# Patient Record
Sex: FEMALE | Race: WHITE | NOT HISPANIC OR LATINO | Employment: OTHER | URBAN - METROPOLITAN AREA
[De-identification: names, ages, dates, MRNs, and addresses within clinical notes are randomized per-mention and may not be internally consistent; named-entity substitution may affect disease eponyms.]

---

## 2017-03-21 ENCOUNTER — OFFICE VISIT (OUTPATIENT)
Dept: FAMILY MEDICINE | Facility: CLINIC | Age: 67
End: 2017-03-21
Payer: COMMERCIAL

## 2017-03-21 VITALS
TEMPERATURE: 97.6 F | HEIGHT: 58 IN | DIASTOLIC BLOOD PRESSURE: 69 MMHG | OXYGEN SATURATION: 95 % | WEIGHT: 150 LBS | HEART RATE: 79 BPM | BODY MASS INDEX: 31.49 KG/M2 | SYSTOLIC BLOOD PRESSURE: 137 MMHG

## 2017-03-21 DIAGNOSIS — R52 BURNING PAIN: ICD-10-CM

## 2017-03-21 DIAGNOSIS — M54.9 BACK PAIN, UNSPECIFIED BACK LOCATION, UNSPECIFIED BACK PAIN LATERALITY, UNSPECIFIED CHRONICITY: Primary | ICD-10-CM

## 2017-03-21 LAB
ALBUMIN UR-MCNC: NEGATIVE MG/DL
APPEARANCE UR: CLEAR
BACTERIA #/AREA URNS HPF: ABNORMAL /HPF
BILIRUB UR QL STRIP: NEGATIVE
COLOR UR AUTO: YELLOW
GLUCOSE UR STRIP-MCNC: NEGATIVE MG/DL
HGB UR QL STRIP: NEGATIVE
KETONES UR STRIP-MCNC: NEGATIVE MG/DL
LEUKOCYTE ESTERASE UR QL STRIP: ABNORMAL
NITRATE UR QL: NEGATIVE
NON-SQ EPI CELLS #/AREA URNS LPF: ABNORMAL /LPF
PH UR STRIP: 7 PH (ref 5–7)
RBC #/AREA URNS AUTO: ABNORMAL /HPF (ref 0–2)
SP GR UR STRIP: 1.01 (ref 1–1.03)
URN SPEC COLLECT METH UR: ABNORMAL
UROBILINOGEN UR STRIP-ACNC: 0.2 EU/DL (ref 0.2–1)
WBC #/AREA URNS AUTO: ABNORMAL /HPF (ref 0–2)

## 2017-03-21 PROCEDURE — 99214 OFFICE O/P EST MOD 30 MIN: CPT | Performed by: NURSE PRACTITIONER

## 2017-03-21 PROCEDURE — 81001 URINALYSIS AUTO W/SCOPE: CPT | Performed by: NURSE PRACTITIONER

## 2017-03-21 PROCEDURE — 87086 URINE CULTURE/COLONY COUNT: CPT | Performed by: NURSE PRACTITIONER

## 2017-03-21 ASSESSMENT — ENCOUNTER SYMPTOMS
TINGLING: 0
BACK PAIN: 1
NAUSEA: 1
SHORTNESS OF BREATH: 0
FEVER: 0
HEADACHES: 1
COUGH: 0
FLANK PAIN: 1
INSOMNIA: 1
MYALGIAS: 1
ABDOMINAL PAIN: 0
DYSURIA: 0
FREQUENCY: 1
DIZZINESS: 1

## 2017-03-21 NOTE — PROGRESS NOTES
HPI      SUBJECTIVE:                                                    Keyonna De La Garza is a 66 year old female who presents to clinic today for the following health issues:      URINARY TRACT SYMPTOMS      Duration: 6 days    Description  frequency, urgency and back pain    Intensity:  moderate    Accompanying signs and symptoms:  Fever/chills: no   Flank pain YES  Nausea and vomiting: YES- nausea sometimes  Vaginal symptoms: none  Abdominal/Pelvic Pain: no     History  History of frequent UTI's: YES  History of kidney stones: no   Sexually Active: no   Possibility of pregnancy: No    Precipitating or alleviating factors: None    Therapies tried and outcome: Aleve and ice helped.       10 months ago started to have terrible back pain at about flank level.   As her pain increased, her nails were turning black so she had a large workup (CT chest/abd/pel, echo were all normal)  Then about every other month since, started getting a flare up of the back pain that last about 2 weeks. 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        Past Medical History   Diagnosis Date     Abdominal pain, other specified site 2013     RUQ     Gallstones 2013     GERD (gastroesophageal reflux disease)      Hypothyroidism      Past Surgical History   Procedure Laterality Date     Colonoscopy       Laparoscopic cholecystectomy  4/24/2013     Procedure: LAPAROSCOPIC CHOLECYSTECTOMY;  Laparoscopic cholecystectomy.;  Surgeon: Bong Pyle MD;  Location:   "OR     Social History   Substance Use Topics     Smoking status: Never Smoker     Smokeless tobacco: Never Used     Alcohol use 0.0 oz/week     0 Standard drinks or equivalent per week      Comment: occas     Current Outpatient Prescriptions   Medication Sig Dispense Refill     levothyroxine (SYNTHROID, LEVOTHROID) 88 MCG tablet Take 1 tablet (88 mcg) by mouth daily 90 tablet 3     Ranitidine HCl (ZANTAC PO) Take 75 mg by mouth daily as needed.       Allergies   Allergen Reactions     Darvon [Aspirin]        Reviewed PMH, med list and allergies.      Review of Systems   Constitutional: Negative for fever.   Respiratory: Negative for cough and shortness of breath.    Cardiovascular: Negative for chest pain and leg swelling.   Gastrointestinal: Positive for nausea (very rare and very mild). Negative for abdominal pain.   Genitourinary: Positive for flank pain, frequency and urgency. Negative for dysuria.   Musculoskeletal: Positive for back pain and myalgias.   Skin: Negative for rash.   Neurological: Positive for dizziness (mild) and headaches. Negative for tingling.   Psychiatric/Behavioral: The patient has insomnia (difficulty sleeping).          /69 (BP Location: Right arm, Patient Position: Chair, Cuff Size: Adult Regular)  Pulse 79  Temp 97.6  F (36.4  C) (Oral)  Ht 4' 9.5\" (1.461 m)  Wt 150 lb (68 kg)  SpO2 95%  BMI 31.9 kg/m2      Physical Exam   Constitutional: She is oriented to person, place, and time and well-developed, well-nourished, and in no distress.   HENT:   Head: Normocephalic.   Eyes: Conjunctivae are normal.   Neck: Normal range of motion. Neck supple.   Cardiovascular: Normal rate, regular rhythm, normal heart sounds and intact distal pulses.    No murmur heard.  Pulmonary/Chest: Effort normal and breath sounds normal. No respiratory distress.   Musculoskeletal: Normal range of motion. She exhibits no tenderness.   Neurological: She is alert and oriented to person, place, and time. She has " normal sensation and normal strength. Gait normal.   Skin: Skin is warm and dry. No rash noted. No erythema.   Psychiatric: Mood and affect normal.   Vitals reviewed.      Assessment and Plan:       ICD-10-CM    1. Back pain, unspecified back location, unspecified back pain laterality, unspecified chronicity M54.9 *UA reflex to Microscopic and Culture ([facility] (except [place] and [place])     Urine Culture Aerobic Bacterial     Urine Microscopic     NEUROLOGY ADULT REFERRAL   2. Burning pain R52 NEUROLOGY ADULT REFERRAL       Episodic burning upper back pain for almost 1 year.   I suspect this is r/t stress as she reports significant strain taking care of her 97 yo mother, including financial burdens   She already had a large neg workup of CT chest/abd/pel and echo along with bloodwork   UA today neg but will await culture   I did refer pt to neuro for consult   She should fu with PCP if symptoms persist        RAJINDER Burton, CNP  [facility] [place]

## 2017-03-21 NOTE — MR AVS SNAPSHOT
After Visit Summary   3/21/2017    Keyonna De La Garza    MRN: [id number]           Patient Information     Date Of Birth          1950        Visit Information        Provider Department      3/21/2017 9:30 AM Harry Franklin APRN CNP [facility]        Today's Diagnoses     Dysuria    -  1    Back pain, unspecified back location, unspecified back pain laterality, unspecified chronicity        Burning pain          Care Instructions    Please follow up with neurology         Follow-ups after your visit        Additional Services     NEUROLOGY ADULT REFERRAL       Your provider has referred you to: N: [facility]  [place] [phone]   [email or web address]    Reason for Referral: Consult    Please be aware that coverage of these services is subject to the terms and limitations of your health insurance plan.  Call member services at your health plan with any benefit or coverage questions.      Please bring the following with you to your appointment:    (1) Any X-Rays, CTs or MRIs which have been performed.  Contact the facility where they were done to arrange for  prior to your scheduled appointment.    (2) List of current medications  (3) This referral request   (4) Any documents/labs given to you for this referral                  Who to contact     If you have questions or need follow up information about today's clinic visit or your schedule please contact [facility] directly at [phone].  Normal or non-critical lab and imaging results will be communicated to you by MyChart, letter or phone within 4 business days after the clinic has received the results. If you do not hear from us within 7 days, please contact the clinic through MyChart or phone. If you have a critical or abnormal lab result, we will notify you by phone as soon as possible.  Submit refill requests through [employer] or call your pharmacy and  "they will forward the refill request to us. Please allow 3 business days for your refill to be completed.          Additional Information About Your Visit        Care EveryWhere ID     This is your Care EveryWhere ID. This could be used by other organizations to access your [place] medical records  [id number]        Your Vitals Were     Pulse Temperature Height Pulse Oximetry BMI (Body Mass Index)       79 97.6  F (36.4  C) (Oral) 4' 9.5\" (1.461 m) 95% 31.9 kg/m2        Blood Pressure from Last 3 Encounters:   03/21/17 137/69   08/10/16 116/64   07/18/16 120/76    Weight from Last 3 Encounters:   03/21/17 150 lb (68 kg)   08/10/16 144 lb 12.8 oz (65.7 kg)   07/18/16 145 lb 8 oz (66 kg)              We Performed the Following     *UA reflex to Microscopic and Culture ([facility] (except [place] and [place])     NEUROLOGY ADULT REFERRAL     Urine Culture Aerobic Bacterial     Urine Microscopic        Primary Care Provider Office Phone # Fax #    Hutchinson Tab De La Garza [phone]       [facility] [address] [facility] [address]  [facility] [address]        Thank you!     Thank you for choosing [facility]  for your care. Our goal is always to provide you with excellent care. Hearing back from our patients is one way we can continue to improve our services. Please take a few minutes to complete the written survey that you may receive in the mail after your visit with us. Thank you!             Your Updated Medication List - Protect others around you: Learn how to safely use, store and throw away your medicines at www.disposemymeds.org.          This list is accurate as of: 3/21/17 10:12 AM.  Always use your most recent med list.                   Brand Name Dispense Instructions for use    levothyroxine 88 MCG tablet    SYNTHROID/LEVOTHROID    90 tablet    Take 1 tablet (88 mcg) by mouth daily       ZANTAC PO      Take 75 mg by mouth daily as " needed.

## 2017-03-21 NOTE — NURSING NOTE
"Chief Complaint   Patient presents with     UTI       Initial /69 (BP Location: Right arm, Patient Position: Chair, Cuff Size: Adult Regular)  Pulse 79  Temp 97.6  F (36.4  C) (Oral)  Ht 4' 9.5\" (1.461 m)  Wt 150 lb (68 kg)  SpO2 95%  BMI 31.9 kg/m2 Estimated body mass index is 31.9 kg/(m^2) as calculated from the following:    Height as of this encounter: 4' 9.5\" (1.461 m).    Weight as of this encounter: 150 lb (68 kg).  Medication Reconciliation: complete.    Heidi Sanchez CMA      "

## 2017-03-22 LAB
BACTERIA SPEC CULT: NORMAL
MICRO REPORT STATUS: NORMAL
SPECIMEN SOURCE: NORMAL

## 2017-04-13 ENCOUNTER — OFFICE VISIT (OUTPATIENT)
Dept: URGENT CARE | Facility: URGENT CARE | Age: 67
End: 2017-04-13
Payer: COMMERCIAL

## 2017-04-13 VITALS
HEART RATE: 84 BPM | SYSTOLIC BLOOD PRESSURE: 140 MMHG | RESPIRATION RATE: 20 BRPM | OXYGEN SATURATION: 97 % | TEMPERATURE: 98.4 F | WEIGHT: 151.6 LBS | BODY MASS INDEX: 32.24 KG/M2 | DIASTOLIC BLOOD PRESSURE: 80 MMHG

## 2017-04-13 DIAGNOSIS — J01.90 ACUTE SINUSITIS WITH COEXISTING CONDITION, NEED PROPHYLACTIC TREATMENT: Primary | ICD-10-CM

## 2017-04-13 DIAGNOSIS — J30.2 SEASONAL ALLERGIC RHINITIS, UNSPECIFIED ALLERGIC RHINITIS TRIGGER: ICD-10-CM

## 2017-04-13 PROCEDURE — 99214 OFFICE O/P EST MOD 30 MIN: CPT | Performed by: PHYSICIAN ASSISTANT

## 2017-04-13 RX ORDER — CEFUROXIME AXETIL 500 MG/1
500 TABLET ORAL 2 TIMES DAILY
Qty: 20 TABLET | Refills: 0 | Status: SHIPPED | OUTPATIENT
Start: 2017-04-13 | End: 2017-08-02

## 2017-04-13 NOTE — MR AVS SNAPSHOT
After Visit Summary   4/13/2017    Keyonna De La Garza    MRN: [id number]           Patient Information     Date Of Birth          1950        Visit Information        Provider Department      4/13/2017 6:45 PM Rosalba Vicente PA-C [facility]        Today's Diagnoses     Acute sinusitis with coexisting condition, need prophylactic treatment    -  1    Seasonal allergic rhinitis, unspecified allergic rhinitis trigger           Follow-ups after your visit        Who to contact     If you have questions or need follow up information about today's clinic visit or your schedule please contact [facility] directly at [phone].  Normal or non-critical lab and imaging results will be communicated to you by MyChart, letter or phone within 4 business days after the clinic has received the results. If you do not hear from us within 7 days, please contact the clinic through MyChart or phone. If you have a critical or abnormal lab result, we will notify you by phone as soon as possible.  Submit refill requests through [employer] or call your pharmacy and they will forward the refill request to us. Please allow 3 business days for your refill to be completed.          Additional Information About Your Visit        Care EveryWhere ID     This is your Care EveryWhere ID. This could be used by other organizations to access your [place] medical records  [id number]        Your Vitals Were     Pulse Temperature Respirations Pulse Oximetry BMI (Body Mass Index)       84 98.4  F (36.9  C) (Oral) 20 97% 32.24 kg/m2        Blood Pressure from Last 3 Encounters:   04/13/17 140/80   03/21/17 137/69   08/10/16 116/64    Weight from Last 3 Encounters:   04/13/17 151 lb 9.6 oz (68.8 kg)   03/21/17 150 lb (68 kg)   08/10/16 144 lb 12.8 oz (65.7 kg)              Today, you had the following     No orders found for display         Today's Medication Changes           These changes are accurate as of: 4/13/17  7:59 PM.  If you have any questions, ask your nurse or doctor.               Start taking these medicines.        Dose/Directions    cefuroxime 500 MG tablet   Commonly known as:  CEFTIN   Used for:  Acute sinusitis with coexisting condition, need prophylactic treatment   Started by:  Rosalba Vicente PA-C        Dose:  500 mg   Take 1 tablet (500 mg) by mouth 2 times daily   Quantity:  20 tablet   Refills:  0            Where to get your medicines      These medications were [id number] - [place], [place] - [address] [facility]  [address] [address] [facility] [address]     Phone:  [phone]     cefuroxime 500 MG tablet                Primary Care Provider Office Phone # Fax #    Hutchinson Tab De La Garza [phone]       [facility] [address] [facility] [address]  [facility] [address]        Thank you!     Thank you for choosing [facility]  for your care. Our goal is always to provide you with excellent care. Hearing back from our patients is one way we can continue to improve our services. Please take a few minutes to complete the written survey that you may receive in the mail after your visit with us. Thank you!             Your Updated Medication List - Protect others around you: Learn how to safely use, store and throw away your medicines at www.disposemymeds.org.          This list is accurate as of: 4/13/17  7:59 PM.  Always use your most recent med list.                   Brand Name Dispense Instructions for use    cefuroxime 500 MG tablet    CEFTIN    20 tablet    Take 1 tablet (500 mg) by mouth 2 times daily       levothyroxine 88 MCG tablet    SYNTHROID/LEVOTHROID    90 tablet    Take 1 tablet (88 mcg) by mouth daily       ZANTAC PO      Take 75 mg by mouth daily as needed.

## 2017-04-14 NOTE — NURSING NOTE
"Chief Complaint   Patient presents with     Cough     cough, chest congestion, fatigue       Initial /80 (BP Location: Right arm, Patient Position: Chair, Cuff Size: Adult Regular)  Pulse 84  Temp 98.4  F (36.9  C) (Oral)  Resp 20  Wt 151 lb 9.6 oz (68.8 kg)  SpO2 97%  BMI 32.24 kg/m2 Estimated body mass index is 32.24 kg/(m^2) as calculated from the following:    Height as of 3/21/17: 4' 9.5\" (1.461 m).    Weight as of this encounter: 151 lb 9.6 oz (68.8 kg).  Medication Reconciliation: complete    "

## 2017-04-14 NOTE — PROGRESS NOTES
SUBJECTIVE:   Keyonna De La Garza is a 66 year old female presenting with a chief complaint of:  1) sinus congestion for the past 8 days, worsening.    2) cough, dry  3) fatigue  Onset of symptoms was as above.  Course of illness is worsening.    Severity moderate  Current and Associated symptoms: as above  Treatment measures tried include None tried.  Predisposing factors include h/o allergies, she has taken allegra in the past, but not currently taking anything..    Past Medical History:   Diagnosis Date     Abdominal pain, other specified site 2013    RUQ     Gallstones 2013     GERD (gastroesophageal reflux disease)      Hypothyroidism      Patient Active Problem List   Diagnosis     Advanced directives, counseling/discussion     Hypothyroidism     CARDIOVASCULAR SCREENING; LDL GOAL LESS THAN 130     GERD (gastroesophageal reflux disease)     Osteopenia     Cholecystitis     Social History   Substance Use Topics     Smoking status: Never Smoker     Smokeless tobacco: Never Used     Alcohol use 0.0 oz/week     0 Standard drinks or equivalent per week      Comment: occas       ROS:  CONSTITUTIONAL:NEGATIVE for fever, chills, change in weight  INTEGUMENTARY/SKIN: NEGATIVE for worrisome rashes, moles or lesions  EYES: NEGATIVE for vision changes or irritation  ENT/MOUTH: as per HPI  RESP:as per HPI  CV: NEGATIVE for chest pain, palpitations or peripheral edema  GI: NEGATIVE for nausea, abdominal pain, heartburn, or change in bowel habits  MUSCULOSKELETAL: NEGATIVE for significant arthralgias or myalgia    OBJECTIVE  :/80 (BP Location: Right arm, Patient Position: Chair, Cuff Size: Adult Regular)  Pulse 84  Temp 98.4  F (36.9  C) (Oral)  Resp 20  Wt 151 lb 9.6 oz (68.8 kg)  SpO2 97%  BMI 32.24 kg/m2  GENERAL APPEARANCE: healthy, alert and no distress  EYES: EOMI,  PERRL, conjunctiva clear  HENT: ear canals and TM's normal.  Nose and mouth without ulcers, erythema or lesions  HENT: nasal turbinates boggy  with bluish hue and rhinorrhea yellow  NECK: supple, nontender, no lymphadenopathy  RESP: lungs clear to auscultation - no rales, rhonchi or wheezes  CV: regular rates and rhythm, normal S1 S2, no murmur noted  ABDOMEN:  soft, nontender, no HSM or masses and bowel sounds normal  NEURO: Normal strength and tone, sensory exam grossly normal,  normal speech and mentation  SKIN: no suspicious lesions or rashes     (J01.90) Acute sinusitis with coexisting condition, need prophylactic treatment  (primary encounter diagnosis)  Comment:   Plan: cefuroxime (CEFTIN) 500 MG tablet        Saline nasal spray  May take benadryl po QHS prn cough    (J30.2) Seasonal allergic rhinitis, unspecified allergic rhinitis trigger  Comment:   Plan: start allegra in a few days    F/U with PCP should symptoms persist or worsen.   Patient expresses understanding and agreement with the assessment and plan as above.

## 2017-05-10 ENCOUNTER — TRANSFERRED RECORDS (OUTPATIENT)
Dept: HEALTH INFORMATION MANAGEMENT | Facility: CLINIC | Age: 67
End: 2017-05-10

## 2017-07-28 DIAGNOSIS — E03.9 HYPOTHYROIDISM: ICD-10-CM

## 2017-07-28 NOTE — TELEPHONE ENCOUNTER
Frederic patient. Has future OV with Dr Mccabe in October.  #90 pended, zero refills.  SIG note appt must be kept for refills.    Pending Prescriptions:                       Disp   Refills    levothyroxine (SYNTHROID/LEVOTHROID) 88 M*90 tab*0            Sig: Take 1 tablet (88 mcg) by mouth daily Must be           seen by new primary provider in planned October           visit for additional refills         Last Written Prescription Date: 8-2-16  Last Quantity: 90, # refills: 3  Last Office Visit with [facility] or [facility] prescribing provider: 3-21-17 Rigoberto, 8-10-16 Frederic   Next 5 appointments (look out 90 days)     Aug 02, 2017  9:00 AM CDT   [facility]    [address] [address]   [phone]            Oct 02, 2017  3:00 PM CDT   PHYSICAL with Yosef [facility]ccabe [facility]   [facility]    [address] [address]   [phone]                   TSH   Date Value Ref Range Status   07/11/2016 3.22 0.40 - 5.00 mU/L Final     RT Prosper (R)

## 2017-07-31 RX ORDER — LEVOTHYROXINE SODIUM 88 UG/1
88 TABLET ORAL DAILY
Qty: 90 TABLET | Refills: 0 | Status: SHIPPED | OUTPATIENT
Start: 2017-07-31 | End: 2017-10-02

## 2017-07-31 NOTE — TELEPHONE ENCOUNTER
Prescription approved per [facility] Refill Protocol.  Evette Ochoa RN

## 2017-08-02 ENCOUNTER — OFFICE VISIT (OUTPATIENT)
Dept: FAMILY MEDICINE | Facility: CLINIC | Age: 67
End: 2017-08-02
Payer: COMMERCIAL

## 2017-08-02 VITALS
DIASTOLIC BLOOD PRESSURE: 68 MMHG | SYSTOLIC BLOOD PRESSURE: 126 MMHG | TEMPERATURE: 98 F | WEIGHT: 149 LBS | OXYGEN SATURATION: 98 % | BODY MASS INDEX: 31.68 KG/M2 | HEART RATE: 71 BPM

## 2017-08-02 DIAGNOSIS — M54.9 CHRONIC BACK PAIN, UNSPECIFIED BACK LOCATION, UNSPECIFIED BACK PAIN LATERALITY: Primary | ICD-10-CM

## 2017-08-02 DIAGNOSIS — G89.29 CHRONIC BACK PAIN, UNSPECIFIED BACK LOCATION, UNSPECIFIED BACK PAIN LATERALITY: Primary | ICD-10-CM

## 2017-08-02 DIAGNOSIS — E03.9 HYPOTHYROIDISM, UNSPECIFIED TYPE: ICD-10-CM

## 2017-08-02 LAB
T4 FREE SERPL-MCNC: 0.93 NG/DL (ref 0.76–1.46)
TSH SERPL DL<=0.005 MIU/L-ACNC: 5.65 MU/L (ref 0.4–4)

## 2017-08-02 PROCEDURE — 84443 ASSAY THYROID STIM HORMONE: CPT | Performed by: NURSE PRACTITIONER

## 2017-08-02 PROCEDURE — 99213 OFFICE O/P EST LOW 20 MIN: CPT | Performed by: NURSE PRACTITIONER

## 2017-08-02 PROCEDURE — 36415 COLL VENOUS BLD VENIPUNCTURE: CPT | Performed by: NURSE PRACTITIONER

## 2017-08-02 PROCEDURE — 84439 ASSAY OF FREE THYROXINE: CPT | Performed by: NURSE PRACTITIONER

## 2017-08-02 NOTE — PROGRESS NOTES
HPI    SUBJECTIVE:                                                    Keyonna De La Garza is a 66 year old female who presents to clinic today for the following health issues:      Chief Complaint   Patient presents with     Recheck Medication     L-thyroxine       Here for TSH check  Has been feeling well but continues with migrating back pain. It can go all over her back: a few days of R low back, then to left upper back.  She saw neuro and had a thorough workup that was negative   Goes to chiro   Has done a lot of things to help with her ergonomics   Sharp pain with deep breathing for the past few days, which can also change spots   Sneezing is severe   Cold used to work but now heating pad helps   Then the pain will go away for 1-2 months and is there for another couple months         Past Medical History:   Diagnosis Date     Abdominal pain, other specified site 2013    RUQ     Gallstones 2013     GERD (gastroesophageal reflux disease)      Hypothyroidism      Past Surgical History:   Procedure Laterality Date     COLONOSCOPY       LAPAROSCOPIC CHOLECYSTECTOMY  4/24/2013    Procedure: LAPAROSCOPIC CHOLECYSTECTOMY;  Laparoscopic cholecystectomy.;  Surgeon: Bong Pyle MD;  Location:  OR     Social History   Substance Use Topics     Smoking status: Never Smoker     Smokeless tobacco: Never Used     Alcohol use 0.0 oz/week     0 Standard drinks or equivalent per week      Comment: occas     Current Outpatient Prescriptions   Medication Sig Dispense Refill     ranitidine (ZANTAC) 150 MG tablet Take 1 tablet (150 mg) by mouth daily       levothyroxine (SYNTHROID/LEVOTHROID) 88 MCG tablet Take 1 tablet (88 mcg) by mouth daily Must be seen by new primary provider in planned October visit for additional refills 90 tablet 0     Allergies   Allergen Reactions     Darvon [Aspirin]        Reviewed and updated as needed this visit by clinical staff and provider      ROS  Detailed as above       /68 (BP  Location: Right arm, Patient Position: Sitting, Cuff Size: Adult Regular)  Pulse 71  Temp 98  F (36.7  C) (Oral)  Wt 149 lb (67.6 kg)  SpO2 98%  Breastfeeding? No  BMI 31.68 kg/m2      Physical Exam   Constitutional: She is well-developed, well-nourished, and in no distress.   HENT:   Head: Normocephalic.   Eyes: Conjunctivae are normal.   Pulmonary/Chest: Effort normal.   Musculoskeletal: Normal range of motion.   Neurological: She is alert.   Psychiatric: Mood and affect normal.   Vitals reviewed.        Assessment and Plan:       ICD-10-CM    1. Chronic back pain, unspecified back location, unspecified back pain laterality M54.9 [facility] REF[facility]    G89.29    2. Hypothyroidism, unspecified type E03.9 TSH with free T4 reflex       Persistent migrating back pain. Has sought out many treatments without improvement  No concern for PE based on historical features.  Will send to PT for evaluation   Also will check TSH. She just got a refill  Scheduled with new PCP in oct       RAJINDER Burton, CNP  [facility]

## 2017-08-02 NOTE — MR AVS SNAPSHOT
After Visit Summary   8/2/2017    Keyonna De La Garza    MRN: [id number]           Patient Information     Date Of Birth          1950        Visit Information        Provider Department      8/2/2017 9:00 AM Harry Franklin APRN CNP [facility]        Today's Diagnoses     Chronic back pain, unspecified back location, unspecified back pain laterality    -  1    Hypothyroidism, unspecified type           Follow-ups after your visit        Additional Services     [facility] PT, HAND, AND CHIROPRACTIC REFERRAL       **This order will print in the [facility] Scheduling Office**    Physical Therapy, Hand Therapy and Chiropractic Care are available through:    *[facility]  *[facility]  *[facility]    Call one number to schedule at any of the above locations: [phone].    Your provider has referred you to: [facility] - PT and/or Chiropractic Care determined by clinical presentation at [facility] or FS Initial Visit    Indication/Reason for Referral: scattered back pain  Onset of Illness: 1.5 years  Therapy Orders: Evaluate and Treat  Special Programs: None  Special Request: None    Gurjit Cagle      Additional Comments for the Therapist or Chiropractor:     Please be aware that coverage of these services is subject to the terms and limitations of your health insurance plan.  Call member services at your health plan with any benefit or coverage questions.      Please bring the following to your appointment:    *Your personal calendar for scheduling future appointments  *Comfortable clothing                  Your next 10 appointments already scheduled     Aug 02, 2017  9:00 AM CDT   [facility] with RA[facility]INDER Helms C[facility]   [facility]    [address] [address] [phone]           Bring a current list of meds and any records pertaining to this visit. For Physicals,  "please bring immunization records and any forms needing to be filled out. Please arrive 10 minutes early to complete paperwork.            Aug 14, 2017  7:45 AM CDT   MA SCREENING DIGITAL BILATERAL with SHBCMA2   [facility] ([facility])    [address]  [facility] [id number] [phone]           Do not use any powder, lotion or deodorant under your arms or on your breast. If you do, we will ask you to remove it before your exam.  Wear comfortable, two-piece clothing.  If you have any allergies, tell your care team.  Bring any previous mammograms from other facilities or have them mailed to the breast center. Three-dimensional (3D) mammograms are available at [place] locations in [facility], and [place]. [facility] locations include [place] and [facility] in [place]. Benefits of 3D mammograms include: - Improved rate of cancer detection - Decreases your chance of having to go back for more tests, which means fewer: - \"False-positive\" results (This means that there is an abnormal area but it isn't cancer.) - Invasive testing procedures, such as a biopsy or surgery - Can provide clearer images of the breast if you have dense breast tissue. 3D mammography is an optional exam that anyone can have with a 2D mammogram. It doesn't replace or take the place of a 2D mammogram. 2D mammograms remain an effective screening test for all women.  Not all insurance companies cover the cost of a 3D mammogram. Check with your insurance.            Oct 02, 2017  3:00 PM CDT   PHYSICAL with Yosef Mccabe MD   [facility] ([facility])    [address] [id number] [phone]              Who to contact     If you have questions or need follow up information about today's clinic visit or your schedule please contact [facility] directly at " "[phone].  Normal or non-critical lab and imaging results will be communicated to you by [employer], letter or phone within 4 business days after the clinic has received the results. If you do not hear from us within 7 days, please contact the clinic through [employer] or phone. If you have a critical or abnormal lab result, we will notify you by phone as soon as possible.  Submit refill requests through [employer] or call your pharmacy and they will forward the refill request to us. Please allow 3 business days for your refill to be completed.          Additional Information About Your Visit        [employer]rharPersonal Information     [employer] lets you send messages to your doctor, view your test results, renew your prescriptions, schedule appointments and more. To sign up, go to [email or web address] . Click on \"Log in\" on the left side of the screen, which will take you to the Welcome page. Then click on \"Sign up Now\" on the right side of the page.     You will be asked to enter the access code listed below, as well as some personal information. Please follow the directions to create your username and password.     Your access code is: [email or web address]  Expires: 10/31/2017  8:39 AM     Your access code will  in 90 days. If you need help or a new code, please call your [place] clinic or [phone].        Care EveryWhere ID     This is your Care EveryWhere ID. This could be used by other organizations to access your [place] medical records  [id number]        Your Vitals Were     Pulse Temperature Pulse Oximetry Breastfeeding? BMI (Body Mass Index)       71 98  F (36.7  C) (Oral) 98% No 31.68 kg/m2        Blood Pressure from Last 3 Encounters:   17 126/68   17 140/80   17 137/69    Weight from Last 3 Encounters:   17 149 lb (67.6 kg)   17 151 lb 9.6 oz (68.8 kg)   17 150 lb (68 kg)              We Performed the Following     [facility] PT, HAND, AND CHIROPRACTIC REFERRAL     TSH with free T4 " reflex          Today's Medication Changes          These changes are accurate as of: 8/2/17  8:39 AM.  If you have any questions, ask your nurse or doctor.               These medicines have changed or have updated prescriptions.        Dose/Directions    ranitidine 150 MG tablet   Commonly known as:  ZANTAC   This may have changed:  Another medication with the same name was removed. Continue taking this medication, and follow the directions you see here.   Changed by:  Harry Franklin APRN CNP        Dose:  150 mg   Take 1 tablet (150 mg) by mouth daily   Refills:  0         Stop taking these medicines if you haven't already. Please contact your care team if you have questions.     cefuroxime 500 MG tablet   Commonly known as:  CEFTIN   Stopped by:  Harry Franklin APRN CNP                    Primary Care Provider Office Phone # Fax #    H[place] Tab De La Garza [phone]       [facility] [address] [address]  [facility] [address]        Equal Access to Services     JOSE ENRIQUE [facility] Health[facility] : Hadii david ku hadasho Soomaali, waaxda luqadaha, qaybta kaalmada adeegyada, waxay idiin haynarcisa pisano . So [facility] [phone].    ATENCIÓN: Si habla español, tiene a spring disposición servicios gratuitos de asistencia lingüística. Llame al [phone].    We comply with applicable federal civil rights laws and [place] laws. We do not discriminate on the basis of race, color, national origin, age, disability sex, sexual orientation or gender identity.            Thank you!     Thank you for choosing [facility]  for your care. Our goal is always to provide you with excellent care. Hearing back from our patients is one way we can continue to improve our services. Please take a few minutes to complete the written survey that you may receive in the mail after your visit with us. Thank you!             Your Updated Medication List - Protect others around you: Learn how  to safely use, store and throw away your medicines at www.disposemymeds.org.          This list is accurate as of: 8/2/17  8:39 AM.  Always use your most recent med list.                   Brand Name Dispense Instructions for use Diagnosis    levothyroxine 88 MCG tablet    SYNTHROID/LEVOTHROID    90 tablet    Take 1 tablet (88 mcg) by mouth daily Must be seen by new primary provider in planned October visit for additional refills    Hypothyroidism       ranitidine 150 MG tablet    ZANTAC     Take 1 tablet (150 mg) by mouth daily

## 2017-08-11 ENCOUNTER — THERAPY VISIT (OUTPATIENT)
Dept: PHYSICAL THERAPY | Facility: CLINIC | Age: 67
End: 2017-08-11
Payer: COMMERCIAL

## 2017-08-11 DIAGNOSIS — M54.6 PAIN IN THORACIC SPINE: ICD-10-CM

## 2017-08-11 DIAGNOSIS — M54.50 LUMBAGO: Primary | ICD-10-CM

## 2017-08-11 PROCEDURE — 97161 PT EVAL LOW COMPLEX 20 MIN: CPT | Mod: GP | Performed by: PHYSICAL THERAPIST

## 2017-08-11 PROCEDURE — 97530 THERAPEUTIC ACTIVITIES: CPT | Mod: GP | Performed by: PHYSICAL THERAPIST

## 2017-08-11 PROCEDURE — 97140 MANUAL THERAPY 1/> REGIONS: CPT | Mod: GP | Performed by: PHYSICAL THERAPIST

## 2017-08-11 NOTE — PROGRESS NOTES
Subjective:    HPI                    Objective:    System    Physical Exam    General     [facility]              Physical Therapy Initial Evaluation  August 11, 2017     Precautions/Restrictions/MD instructions: PT eval and treat.     Subjective:   Date of Onset: 8/ 2/17  C/C: global back pain at low back, center of back and along medial borders of B scapula.   Quality of pain is aching and sharp. Pains are described as intermittent in nature. Pain is worse: evening and as day goes on. Pain is rated 9/10. Denies numbness and tingling to B LE but occasional tingling in B thumbs. Pain is worse in thoracic spine today but migrates through-out her back during the day.  History of symptoms: Pains began gradually as the result of unknown reasons. Since exacerbation a week and a half ago, symptoms are worsening.  Worsened by: sneezing, coughing, sitting at the computer, watching TV, sleeping.    Alleviated by: Ice, Heat, alieve, and lumbar support.    General health as reported by patient: good  Pertinent medical/surgical history: Osteoarthritis, thyroid problems, headaches. She denies night pain, painful cough, painful peripheral motor deficit, recent bowel/bladder change, recent vision change, ringing in the ears or pain with swallowing, significant current illness or recent hospital admission.She  denies any regional implanted devices.  Regular exercise: resistance exercises 3x per week with weight machines  Imaging: CT. Per patient. 1 year ago Current occupational status: Marketing associated. Patient's goals are: decrease pain, improve strength, sit for longer periods at work, perform regular house duties including lifting. Return to MD:  PRN.     Therapist Impression:   Keyonna De La Garza is a 66 year old female with chronic low back pain and pain in thoracic spine history of scoliosis (per pt). She presents with signs and symptoms consistent with Low back pain and pain in the thoracic spine . These impairments limit her  ability to sit for longer periods at work, perform regular house duties including lifting, and perform caregiver duties. Skilled PT services are necessary in order to reduce impairments and improve independent function.    Objective:  LUMBAR:    Posture: significant forward shoulder posture, R rib hump and mild lateral curve to thoracic spine    Neurological:    Motor Deficit:  Myotomes L R   L1-2 (hip flexion) 4+ 4+   L3 (knee extension) 4+ 4+   L4 (ankle DF) 4 4   L5 (g. toe ext) 4+ 4+   S1 (ankle PF or knee flex) 4 4     Sensory Deficit, Reflexes: Sensation intact at B UE and LE  UE strength screen:  4+/5 B in shoulder flexion, abduction, ER, IR, thumb abduction, and intrinsic muscle strength    Dural Signs:   L R   Slump negative negative   SLR     Other: Spring testing shows significantly reduced mobility throughout thoracic spine    AROM: (Major, Moderate, Minimal or Nil loss)  Movement Loss Edilberto Mod Min Nil Pain   Flexion   x  present   Extension  x   present   Side Gliding L  x   present   Side Gliding R  x   present     SLS: unable to maintain for 10 sec B, able with light hand hold  Squat: demos significant dynamic valgus and increased forward lean      Assessment/Plan:    The patient is a 66 year old female with chief complaint of low back pain and pain in thoracic spine.    The patient has the following significant findings with corresponding treatment plan.  Diagnosis 1:  Low back pain  Pain -  hot/cold therapy, electric stimulation, mechanical traction, manual therapy, self management, education, directional preference exercise and home program  Decreased ROM/flexibility - manual therapy and therapeutic exercise  Decreased joint mobility - manual therapy and therapeutic exercise  Decreased strength - therapeutic exercise and therapeutic activities  Impaired balance - neuro re-education and therapeutic activities    Diagnosis 2:  Pain in thoracic spine  Pain -  hot/cold therapy, electric stimulation,  manual therapy, STS, self management and education  Decreased ROM/flexibility - manual therapy and therapeutic exercise  Decreased joint mobility - manual therapy and therapeutic exercise  Decreased strength - therapeutic exercise, therapeutic activities and home program      Therapy Evaluation Codes:   1) History comprised of:   Personal factors that impact the plan of care:      Anxiety, Past/current experiences and Time since onset of symptoms.    Comorbidity factors that impact the plan of care are:      None.     Medications impacting care: None.  2) Examination of Body Systems comprised of:   Body structures and functions that impact the plan of care:      Lumbar spine and Thoracic Spine.   Activity limitations that impact the plan of care are:      Cooking, Driving, Lifting and Reading/Computer work.   Clinical presentation characteristics are:    Stable/Uncomplicated.  3) Presentation comprised of:   Presentation scored as Low complexity with uncomplicated characteristics..  4) Decision-Making    Low complexity using standardized patient assessment instrument and/or measureable assessment of functional outcome.  Cumulative Therapy Evaluation is: Low complexity.    Previous and current functional limitations:  (See Goal Flow Sheet for this information)    Short term and Long term goals: (See Goal Flow Sheet for this information)     Communication ability:  Patient appears to be able to clearly communicate and understand verbal and written communication and follow directions correctly.  Treatment Explanation - The following has been discussed with the patient: RX ordered/plan of care, anticipated outcomes, and possible risks and side effects.  This patient would benefit from PT intervention to resume normal activities.   Rehab potential is fair.    Frequency:  1 X week, once daily  Duration:  for 8 weeks  Discharge Plan: Achieve all LTGs, be independent in home treatment program, and reach maximal therapeutic  benefit.    Please refer to the daily flowsheet for treatment today, total treatment time and time spent performing 1:1 timed codes.

## 2017-08-14 ENCOUNTER — HOSPITAL ENCOUNTER (OUTPATIENT)
Dept: MAMMOGRAPHY | Facility: CLINIC | Age: 67
Discharge: HOME | End: 2017-08-14
Attending: INTERNAL MEDICINE | Admitting: INTERNAL MEDICINE
Payer: COMMERCIAL

## 2017-08-14 DIAGNOSIS — Z12.31 VISIT FOR SCREENING MAMMOGRAM: ICD-10-CM

## 2017-08-14 PROBLEM — M54.6 PAIN IN THORACIC SPINE: Status: ACTIVE | Noted: 2017-08-14

## 2017-08-14 PROCEDURE — G0202 SCR MAMMO BI INCL CAD: HCPCS

## 2017-08-14 NOTE — PROGRESS NOTES
Subjective:    Patient is a 66 year old female presenting with rehab left ankle/foot hpi.                                      Pertinent medical history includes:  Osteoarthritis, thyroid problems and migraines.  Medical allergies: no.  Other surgeries include:  None reported.  Current medications:  Thyroid medication.  Current occupation is  .  Patient is working in normal job without restrictions.  Primary job tasks include:  Prolonged sitting.    Barriers include:  None as reported by patient.    Red flags:  None as reported by patient.      Oswestry Score: 30 %                 Objective:    System    Physical Exam    General     ROS    Assessment/Plan:

## 2017-08-16 ENCOUNTER — THERAPY VISIT (OUTPATIENT)
Dept: PHYSICAL THERAPY | Facility: CLINIC | Age: 67
End: 2017-08-16
Payer: COMMERCIAL

## 2017-08-16 DIAGNOSIS — M54.6 PAIN IN THORACIC SPINE: ICD-10-CM

## 2017-08-16 DIAGNOSIS — M54.50 LUMBAGO: ICD-10-CM

## 2017-08-16 PROCEDURE — 97140 MANUAL THERAPY 1/> REGIONS: CPT | Mod: GP

## 2017-08-16 PROCEDURE — 97110 THERAPEUTIC EXERCISES: CPT | Mod: GP

## 2017-08-23 ENCOUNTER — THERAPY VISIT (OUTPATIENT)
Dept: PHYSICAL THERAPY | Facility: CLINIC | Age: 67
End: 2017-08-23
Payer: COMMERCIAL

## 2017-08-23 DIAGNOSIS — M54.50 LUMBAGO: ICD-10-CM

## 2017-08-23 DIAGNOSIS — M54.6 PAIN IN THORACIC SPINE: ICD-10-CM

## 2017-08-23 PROCEDURE — 97140 MANUAL THERAPY 1/> REGIONS: CPT | Mod: GP

## 2017-08-23 PROCEDURE — 97112 NEUROMUSCULAR REEDUCATION: CPT | Mod: GP

## 2017-10-02 ENCOUNTER — OFFICE VISIT (OUTPATIENT)
Dept: FAMILY MEDICINE | Facility: CLINIC | Age: 67
End: 2017-10-02
Payer: COMMERCIAL

## 2017-10-02 VITALS
HEART RATE: 68 BPM | RESPIRATION RATE: 16 BRPM | WEIGHT: 151 LBS | DIASTOLIC BLOOD PRESSURE: 74 MMHG | OXYGEN SATURATION: 98 % | HEIGHT: 58 IN | BODY MASS INDEX: 31.7 KG/M2 | SYSTOLIC BLOOD PRESSURE: 127 MMHG | TEMPERATURE: 97.8 F

## 2017-10-02 DIAGNOSIS — J01.01 ACUTE RECURRENT MAXILLARY SINUSITIS: ICD-10-CM

## 2017-10-02 DIAGNOSIS — Z23 NEED FOR PROPHYLACTIC VACCINATION AND INOCULATION AGAINST INFLUENZA: ICD-10-CM

## 2017-10-02 DIAGNOSIS — E03.9 HYPOTHYROIDISM, UNSPECIFIED TYPE: ICD-10-CM

## 2017-10-02 DIAGNOSIS — Z00.00 ROUTINE GENERAL MEDICAL EXAMINATION AT A HEALTH CARE FACILITY: Primary | ICD-10-CM

## 2017-10-02 LAB
ERYTHROCYTE [DISTWIDTH] IN BLOOD BY AUTOMATED COUNT: 13.6 % (ref 10–15)
HCT VFR BLD AUTO: 41.1 % (ref 35–47)
HGB BLD-MCNC: 13.4 G/DL (ref 11.7–15.7)
MCH RBC QN AUTO: 29.5 PG (ref 26.5–33)
MCHC RBC AUTO-ENTMCNC: 32.6 G/DL (ref 31.5–36.5)
MCV RBC AUTO: 91 FL (ref 78–100)
PLATELET # BLD AUTO: 388 10E9/L (ref 150–450)
RBC # BLD AUTO: 4.54 10E12/L (ref 3.8–5.2)
WBC # BLD AUTO: 9.9 10E9/L (ref 4–11)

## 2017-10-02 PROCEDURE — 90472 IMMUNIZATION ADMIN EACH ADD: CPT | Performed by: INTERNAL MEDICINE

## 2017-10-02 PROCEDURE — 90662 IIV NO PRSV INCREASED AG IM: CPT | Performed by: INTERNAL MEDICINE

## 2017-10-02 PROCEDURE — 99397 PER PM REEVAL EST PAT 65+ YR: CPT | Mod: 25 | Performed by: INTERNAL MEDICINE

## 2017-10-02 PROCEDURE — 90471 IMMUNIZATION ADMIN: CPT | Performed by: INTERNAL MEDICINE

## 2017-10-02 PROCEDURE — 84443 ASSAY THYROID STIM HORMONE: CPT | Performed by: INTERNAL MEDICINE

## 2017-10-02 PROCEDURE — 85027 COMPLETE CBC AUTOMATED: CPT | Performed by: INTERNAL MEDICINE

## 2017-10-02 PROCEDURE — 36415 COLL VENOUS BLD VENIPUNCTURE: CPT | Performed by: INTERNAL MEDICINE

## 2017-10-02 PROCEDURE — 80053 COMPREHEN METABOLIC PANEL: CPT | Performed by: INTERNAL MEDICINE

## 2017-10-02 PROCEDURE — 80061 LIPID PANEL: CPT | Performed by: INTERNAL MEDICINE

## 2017-10-02 PROCEDURE — 90670 PCV13 VACCINE IM: CPT | Performed by: INTERNAL MEDICINE

## 2017-10-02 RX ORDER — LEVOTHYROXINE SODIUM 88 UG/1
88 TABLET ORAL DAILY
Qty: 90 TABLET | Refills: 3 | Status: SHIPPED | OUTPATIENT
Start: 2017-10-02 | End: 2017-10-03

## 2017-10-02 NOTE — MR AVS SNAPSHOT
After Visit Summary   10/2/2017    Keyonna De La Garza    MRN: [id number]           Patient Information     Date Of Birth          1950        Visit Information        Provider Department      10/2/2017 3:00 PM Yosef Mccabe MD [facility]        Today's Diagnoses     Routine general medical examination at a health care facility    -  1    Acute recurrent maxillary sinusitis        Hypothyroidism, unspecified type        Need for prophylactic vaccination and inoculation against influenza          Care Instructions      Preventive Health Recommendations    Female Ages 65 +    Yearly exam:     See your health care provider every year in order to  o Review health changes.   o Discuss preventive care.    o Review your medicines if your doctor has prescribed any.      You no longer need a yearly Pap test unless you've had an abnormal Pap test in the past 10 years. If you have vaginal symptoms, such as bleeding or discharge, be sure to talk with your provider about a Pap test.      Every 1 to 2 years, have a mammogram.  If you are over 69, talk with your health care provider about whether or not you want to continue having screening mammograms.      Every 10 years, have a colonoscopy. Or, have a yearly FIT test (stool test). These exams will check for colon cancer.       Have a cholesterol test every 5 years, or more often if your doctor advises it.       Have a diabetes test (fasting glucose) every three years. If you are at risk for diabetes, you should have this test more often.       At age 65, have a bone density scan (DEXA) to check for osteoporosis (brittle bone disease).    Shots:    Get a flu shot each year.    Get a tetanus shot every 10 years.    Talk to your doctor about your pneumonia vaccines. There are now two you should receive - Pneumovax (PPSV 23) and Prevnar (PCV 13).    Talk to your doctor about the shingles vaccine.    Talk to your doctor about the hepatitis B  vaccine.    Nutrition:     Eat at least 5 servings of fruits and vegetables each day.      Eat whole-grain bread, whole-wheat pasta and brown rice instead of white grains and rice.      Talk to your provider about Calcium and Vitamin D.     Lifestyle    Exercise at least 150 minutes a week (30 minutes a day, 5 days a week). This will help you control your weight and prevent disease.      Limit alcohol to one drink per day.      No smoking.       Wear sunscreen to prevent skin cancer.       See your dentist twice a year for an exam and cleaning.      See your eye doctor every 1 to 2 years to screen for conditions such as glaucoma, macular degeneration and cataracts.          Follow-ups after your visit        Additional Services     OTOLARYNGOLOGY REFERRAL       Your provider has referred you to: [facility]: [facility] [phone]   [email or web address]    Dr. Geovany Ch    Please be aware that coverage of these services is subject to the terms and limitations of your health insurance plan.  Call member services at your health plan with any benefit or coverage questions.      Please bring the following with you to your appointment:    (1) Any X-Rays, CTs or MRIs which have been performed.  Contact the facility where they were done to arrange for  prior to your scheduled appointment.   (2) List of current medications  (3) This referral request   (4) Any documents/labs given to you for this referral                  Follow-up notes from your care team     Return in about 1 year (around 10/2/2018) for Physical Exam.      Who to contact     If you have questions or need follow up information about today's clinic visit or your schedule please contact [facility] directly at [phone].  Normal or non-critical lab and imaging results will be communicated to you by MyChart, letter or phone within 4 business days after the clinic has received the results. If you do  "not hear from us within 7 days, please contact the clinic through [employer] or phone. If you have a critical or abnormal lab result, we will notify you by phone as soon as possible.  Submit refill requests through [employer] or call your pharmacy and they will forward the refill request to us. Please allow 3 business days for your refill to be completed.          Additional Information About Your Visit        [employer] Information     [employer] lets you send messages to your doctor, view your test results, renew your prescriptions, schedule appointments and more. To sign up, go to [email or web address] . Click on \"Log in\" on the left side of the screen, which will take you to the Welcome page. Then click on \"Sign up Now\" on the right side of the page.     You will be asked to enter the access code listed below, as well as some personal information. Please follow the directions to create your username and password.     Your access code is: [email or web address]  Expires: 10/31/2017  8:39 AM     Your access code will  in 90 days. If you need help or a new code, please call your [place] clinic or [phone].        Care EveryWhere ID     This is your Care EveryWhere ID. This could be used by other organizations to access your [place] medical records  [id number]        Your Vitals Were     Pulse Temperature Respirations Height Pulse Oximetry BMI (Body Mass Index)    68 97.8  F (36.6  C) (Oral) 16 4' 9.5\" (1.461 m) 98% 32.11 kg/m2       Blood Pressure from Last 3 Encounters:   10/02/17 127/74   17 126/68   17 140/80    Weight from Last 3 Encounters:   10/02/17 151 lb (68.5 kg)   17 149 lb (67.6 kg)   17 151 lb 9.6 oz (68.8 kg)              We Performed the Following     CBC with platelets     Comprehensive metabolic panel     FLU VACCINE, INCREASED ANTIGEN, PRESV FREE, AGE 65+ [67814]     Lipid panel reflex to direct LDL     OTOLARYNGOLOGY REFERRAL     TSH     Vaccine Administration, Initial [72700]  "         Where to get your medicines      These medications were sent to [id number] [address] [facility]  [address] [facility] [address]     Phone:  [phone]     levothyroxine 88 MCG tablet          Primary Care Provider Office Phone # Fax #    Yosef Mccabe [phone]       [facility] [address]  [facility] [address]        Equal Access to Services     JOSE ENRIQUE VALENTINE : Hadii aad ku hadasho Soomaali, waaxda luqadaha, qaybta kaalmada adeegyada, waxay idiin hayaan adeeg kharash la'narcisa . So [facility] [phone].    ATENCIÓN: Si habla español, tiene a spring disposición servicios gratuitos de asistencia lingüística. [facility] [phone].    We comply with applicable federal civil rights laws and [place] laws. We do not discriminate on the basis of race, color, national origin, age, disability, sex, sexual orientation, or gender identity.            Thank you!     Thank you for choosing [facility]  for your care. Our goal is always to provide you with excellent care. Hearing back from our patients is one way we can continue to improve our services. Please take a few minutes to complete the written survey that you may receive in the mail after your visit with us. Thank you!             Your Updated Medication List - Protect others around you: Learn how to safely use, store and throw away your medicines at www.disposemymeds.org.          This list is accurate as of: 10/2/17  3:44 PM.  Always use your most recent med list.                   Brand Name Dispense Instructions for use Diagnosis    levothyroxine 88 MCG tablet    SYNTHROID/LEVOTHROID    90 tablet    Take 1 tablet (88 mcg) by mouth daily Must be seen by new primary provider in planned October visit for additional refills    Hypothyroidism, unspecified type       OCEAN ULTRA SALINE MIST NA           ranitidine 150 MG tablet    ZANTAC     Take 1 tablet (150 mg) by mouth  daily

## 2017-10-02 NOTE — LETTER
[facility]  [address] [facility]  [address]  [place]  [address]  Tel: [phone]    October 3, 2017    Keyonna De La Garza  [address] [facility] [address]        Dear Ms. De La Garza,    The following letter pertains to your most recent diagnostic tests:     -Your thyroid test indicates that you are not getting enough thyroid medication.     -Your cholesterol panel looks healthy.     -Liver and gallbladder tests are normal for you. (ALT,AST, Alk phos, bilirubin), kidney function is normal for you (Creatinine, GFR), Sodium is normal, Potassium is normal for you, Calcium is normal for you, Glucose (blood sugar) is normal for you.       -Your complete blood counts including your hemoglobin returned normal for you.         Bottom line:  Your labs look good except for your thyroid test.  Your thyroid blood test TSH indicates that you are getting too little thyroid medication (levothyroxine).  I recommend that we increase your levothyroxine dose from 88 mcg to 100 mcg daily and that we recheck your thyroid blood test (TSH) after you have been taking the new levothyroxine dose for 2 months.  You can schedule a lab appointment for that purpose.  I have sent an prescritpion for the new levothyroxine dose to your pharmacy.           Follow up:  Lab appointment in 2 months for TSH recheck on new dose of levothyroxine  If you have any further questions or problems, please contact our office.      Sincerely,    Yosef Mccabe MD/ALISE          Enclosure: Lab Results  Results for orders placed or performed in visit on 10/02/17   TSH   Result Value Ref Range    TSH 8.87 (H) 0.40 - 4.00 mU/L   Lipid panel reflex to direct LDL   Result Value Ref Range    Cholesterol 175 <200 mg/dL    Triglycerides 91 <150 mg/dL    HDL Cholesterol 81 >49 mg/dL    LDL Cholesterol Calculated 76 <100 mg/dL    Non HDL Cholesterol 94 <130 mg/dL   Comprehensive metabolic panel   Result Value Ref Range    Sodium 139 133 - 144 mmol/L     Potassium 3.9 3.4 - 5.3 mmol/L    Chloride 106 94 - 109 mmol/L    Carbon Dioxide 28 20 - 32 mmol/L    Anion Gap 5 3 - 14 mmol/L    Glucose 77 70 - 99 mg/dL    Urea Nitrogen 9 7 - 30 mg/dL    Creatinine 0.72 0.52 - 1.04 mg/dL    GFR Estimate 81 >60 mL/min/1.7m2    GFR Estimate If Black >90 >60 mL/min/1.7m2    Calcium 8.6 8.5 - 10.1 mg/dL    Bilirubin Total 0.3 0.2 - 1.3 mg/dL    Albumin 3.8 3.4 - 5.0 g/dL    Protein Total 7.9 6.8 - 8.8 g/dL    Alkaline Phosphatase 88 40 - 150 U/L    ALT 18 0 - 50 U/L    AST 13 0 - 45 U/L   CBC with platelets   Result Value Ref Range    WBC 9.9 4.0 - 11.0 10e9/L    RBC Count 4.54 3.8 - 5.2 10e12/L    Hemoglobin 13.4 11.7 - 15.7 g/dL    Hematocrit 41.1 35.0 - 47.0 %    MCV 91 78 - 100 fl    MCH 29.5 26.5 - 33.0 pg    MCHC 32.6 31.5 - 36.5 g/dL    RDW 13.6 10.0 - 15.0 %    Platelet Count 388 150 - 450 10e9/L

## 2017-10-02 NOTE — PROGRESS NOTES
SUBJECTIVE:   Keyonna De La Garza is a 67 year old female who presents for Preventive Visit.      Are you in the first 12 months of your Medicare Part B coverage?  No    Healthy Habits:    Do you get at least three servings of calcium containing foods daily (dairy, green leafy vegetables, etc.)? yes    Amount of exercise or daily activities, outside of work: 3 day(s) per week    Problems taking medications regularly No    Medication side effects: No    Have you had an eye exam in the past two years? yes    Do you see a dentist twice per year? yes    Do you have sleep apnea, excessive snoring or daytime drowsiness?no    COGNITIVE SCREEN  1) Repeat 3 items (Banana, Sunrise, Chair)    2) Clock draw: NORMAL  3) 3 item recall: Recalls 3 objects  Results: 3 items recalled: COGNITIVE IMPAIRMENT LESS LIKELY    Mini-CogTM Copyright S Sul[employer]. Licensed by the author for use in [facility]; reprinted with permission (micki@[facility]). All rights reserved.          Reviewed and updated as needed this visit by clinical staffTobacco  Allergies  Meds  Med Hx  Surg Hx  Fam Hx  Soc Hx        Reviewed and updated as needed this visit by Provider        Social History   Substance Use Topics     Smoking status: Never Smoker     Smokeless tobacco: Never Used     Alcohol use 0.0 oz/week     0 Standard drinks or equivalent per week      Comment: occas       The patient does not drink >3 drinks per day nor >7 drinks per week.    Today's PHQ-2 Score:   PHQ-2 ( 1999 Pf[employer]) 10/2/2017 3/21/2017   Q1: Little interest or pleasure in doing things 0 0   Q2: Feeling down, depressed or hopeless 0 1   PHQ-2 Score 0 1       Do you feel safe in your environment - Yes    Do you have a Health Care Directive?: Yes: Advance Directive has been received and scanned.    Current providers sharing in care for this patient include:   Patient Care Team:  Yosef Mccabe MD as PCP - General (Internal Medicine)      Hearing impairment: No    Ability  to successfully perform activities of daily living: Yes, no assistance needed     Fall risk:  Fallen 2 or more times in the past year?: No  Any fall with injury in the past year?: No      Home safety:  none identified      The following health maintenance items are reviewed in Epic and correct as of today:  Health Maintenance   Topic Date Due     PNEUMOCOCCAL (1 of 2 - PCV13) 09/02/2016     ADVANCE DIRECTIVE PLANNING Q5 YRS  02/08/2017     INFLUENZA VACCINE (SYSTEM ASSIGNED)  09/01/2017     PAP Q3 YR  12/01/2017     FALL RISK ASSESSMENT  03/21/2018     TSH Q1 YEAR  08/02/2018     COLON CANCER SCREEN (SYSTEM ASSIGNED)  06/11/2019     MAMMO SCREEN Q2 YR (SYSTEM ASSIGNED)  08/14/2019     LIPID SCREEN Q5 YR FEMALE (SYSTEM ASSIGNED)  08/10/2021     TETANUS IMMUNIZATION (SYSTEM ASSIGNED)  02/09/2022     DEXA SCAN SCREENING (SYSTEM ASSIGNED)  Completed     HEPATITIS C SCREENING  Completed     Patient Active Problem List   Diagnosis     Advanced directives, counseling/discussion     Hypothyroidism     CARDIOVASCULAR SCREENING; LDL GOAL LESS THAN 130     GERD (gastroesophageal reflux disease)     Osteopenia     Cholecystitis     Lumbago     Pain in thoracic spine     Past Surgical History:   Procedure Laterality Date     COLONOSCOPY       LAPAROSCOPIC CHOLECYSTECTOMY  4/24/2013    Procedure: LAPAROSCOPIC CHOLECYSTECTOMY;  Laparoscopic cholecystectomy.;  Surgeon: Bong Pyle MD;  Location:  OR       Social History   Substance Use Topics     Smoking status: Never Smoker     Smokeless tobacco: Never Used     Alcohol use 0.0 oz/week     0 Standard drinks or equivalent per week      Comment: occas     Family History   Problem Relation Age of Onset     C.A.D. Mother      CEREBROVASCULAR DISEASE Father          Current Outpatient Prescriptions   Medication Sig Dispense Refill     Nasal Moisturizer Combination (OCEAN ULTRA SALINE MIST NA)        levothyroxine (SYNTHROID/LEVOTHROID) 88 MCG tablet Take 1 tablet (88 mcg)  "by mouth daily Must be seen by new primary provider in planned October visit for additional refills 90 tablet 3     ranitidine (ZANTAC) 150 MG tablet Take 1 tablet (150 mg) by mouth daily       [DISCONTINUED] levothyroxine (SYNTHROID/LEVOTHROID) 88 MCG tablet Take 1 tablet (88 mcg) by mouth daily Must be seen by new primary provider in planned October visit for additional refills 90 tablet 0     Allergies   Allergen Reactions     Darvon [Aspirin]              ROS:    Frequent winter time \"sinus infections\" she states that she becomes ill for up to 3 months when she gets sick and it happens every winter.  She has never seen ENT for this.  She gets intolerable headaches from over the counter nasal sprays including Flonase.  No relief with Claritin or Zyrtec   A 12 organ systems ROS is negative    OBJECTIVE:   /74 (BP Location: Right arm, Patient Position: Chair, Cuff Size: Adult Regular)  Pulse 68  Temp 97.8  F (36.6  C) (Oral)  Resp 16  Ht 4' 9.5\" (1.461 m)  Wt 151 lb (68.5 kg)  SpO2 98%  BMI 32.11 kg/m2 Estimated body mass index is 32.11 kg/(m^2) as calculated from the following:    Height as of this encounter: 4' 9.5\" (1.461 m).    Weight as of this encounter: 151 lb (68.5 kg).  EXAM:   GENERAL APPEARANCE: healthy, alert and no distress  EYES: Eyes grossly normal to inspection, PERRL and conjunctivae and sclerae normal  HENT: ear canals and TM's normal, nose and mouth without ulcers or lesions, oropharynx clear and oral mucous membranes moist  NECK: no adenopathy, no asymmetry, masses, or scars and thyroid normal to palpation  RESP: lungs clear to auscultation - no rales, rhonchi or wheezes  BREAST: normal without masses, tenderness or nipple discharge and no palpable axillary masses or adenopathy  CV: regular rate and rhythm, normal S1 S2, no S3 or S4, no murmur, click or rub, no peripheral edema and peripheral pulses strong  ABDOMEN: soft, nontender, no hepatosplenomegaly, no masses and bowel sounds " "normal  MS: no musculoskeletal defects are noted and gait is age appropriate without ataxia  SKIN: no suspicious lesions or rashes  NEURO: Normal strength and tone, sensory exam grossly normal, mentation intact and speech normal  PSYCH: mentation appears normal and affect normal/bright    ASSESSMENT / PLAN:   1. Routine general medical examination at a health care facility    - Lipid panel reflex to direct LDL  - Comprehensive metabolic panel  - CBC with platelets    2. Acute recurrent maxillary sinusitis    - OTOLARYNGOLOGY REFERRAL    3. Hypothyroidism, unspecified type    - levothyroxine (SYNTHROID/LEVOTHROID) 88 MCG tablet; Take 1 tablet (88 mcg) by mouth daily Must be seen by new primary provider in planned October visit for additional refills  Dispense: 90 tablet; Refill: 3  - TSH    4. Need for prophylactic vaccination and inoculation against influenza    - FLU VACCINE, INCREASED ANTIGEN, PRESV FREE, AGE 65+ [31493]  - Vaccine Administration, Initial [91996]    End of Life Planning:  Patient currently has an advanced directive: Yes.  Practitioner is supportive of decision.    COUNSELING:  Reviewed preventive health counseling, as reflected in patient instructions  Special attention given to:       Regular exercise       Healthy diet/nutrition       Immunizations    Vaccinated for: Influenza and Pneumococcal  13         Colon cancer screening       Hepatitis C screening      BP Screening:   Last 3 BP Readings:    BP Readings from Last 3 Encounters:   10/02/17 127/74   08/02/17 126/68   04/13/17 140/80       The following was recommended to the patient:  Re-screen BP within a year and recommended lifestyle modifications    Estimated body mass index is 32.11 kg/(m^2) as calculated from the following:    Height as of this encounter: 4' 9.5\" (1.461 m).    Weight as of this encounter: 151 lb (68.5 kg).  Weight management plan: Discussed healthy diet and exercise guidelines and patient will follow up in 12 months in " clinic to re-evaluate.   reports that she has never smoked. She has never used smokeless tobacco.        Appropriate preventive services were discussed with this patient, including applicable screening as appropriate for cardiovascular disease, diabetes, osteopenia/osteoporosis, and glaucoma.  As appropriate for age/gender, discussed screening for colorectal cancer, prostate cancer, breast cancer, and cervical cancer. Checklist reviewing preventive services available has been given to the patient.    Reviewed patients plan of care and provided an AVS. The Basic Care Plan (routine screening as documented in Health Maintenance) for Keyonna meets the Care Plan requirement. This Care Plan has been established and reviewed with the Patient.    Counseling Resources:  ATP IV Guidelines  Pooled Cohorts Equation Calculator  Breast Cancer Risk Calculator  FRAX Risk Assessment  ICSI Preventive Guidelines  Dietary Guidelines for Americans, 2010  USDA's MyPlate  ASA Prophylaxis  Lung CA Screening    Yosef Mccabe MD  [facility] EDINAInjectable Influenza Immunization Documentation    1.  Is the person to be vaccinated sick today?   No    2. Does the person to be vaccinated have an allergy to a component   of the vaccine?   No    3. Has the person to be vaccinated ever had a serious reaction   to influenza vaccine in the past?   No    4. Has the person to be vaccinated ever had Guillain-Barré syndrome?   No    Form completed by Patient

## 2017-10-03 LAB
ALBUMIN SERPL-MCNC: 3.8 G/DL (ref 3.4–5)
ALP SERPL-CCNC: 88 U/L (ref 40–150)
ALT SERPL W P-5'-P-CCNC: 18 U/L (ref 0–50)
ANION GAP SERPL CALCULATED.3IONS-SCNC: 5 MMOL/L (ref 3–14)
AST SERPL W P-5'-P-CCNC: 13 U/L (ref 0–45)
BILIRUB SERPL-MCNC: 0.3 MG/DL (ref 0.2–1.3)
BUN SERPL-MCNC: 9 MG/DL (ref 7–30)
CALCIUM SERPL-MCNC: 8.6 MG/DL (ref 8.5–10.1)
CHLORIDE SERPL-SCNC: 106 MMOL/L (ref 94–109)
CHOLEST SERPL-MCNC: 175 MG/DL
CO2 SERPL-SCNC: 28 MMOL/L (ref 20–32)
CREAT SERPL-MCNC: 0.72 MG/DL (ref 0.52–1.04)
GFR SERPL CREATININE-BSD FRML MDRD: 81 ML/MIN/1.7M2
GLUCOSE SERPL-MCNC: 77 MG/DL (ref 70–99)
HDLC SERPL-MCNC: 81 MG/DL
LDLC SERPL CALC-MCNC: 76 MG/DL
NONHDLC SERPL-MCNC: 94 MG/DL
POTASSIUM SERPL-SCNC: 3.9 MMOL/L (ref 3.4–5.3)
PROT SERPL-MCNC: 7.9 G/DL (ref 6.8–8.8)
SODIUM SERPL-SCNC: 139 MMOL/L (ref 133–144)
TRIGL SERPL-MCNC: 91 MG/DL
TSH SERPL DL<=0.005 MIU/L-ACNC: 8.87 MU/L (ref 0.4–4)

## 2017-10-03 RX ORDER — LEVOTHYROXINE SODIUM 100 UG/1
100 TABLET ORAL DAILY
Qty: 90 TABLET | Refills: 3 | Status: SHIPPED | OUTPATIENT
Start: 2017-10-03 | End: 2018-10-08

## 2017-10-03 NOTE — PROGRESS NOTES
The following letter pertains to your most recent diagnostic tests:    -Your thyroid test indicates that you are not getting enough thyroid medication.    -Your cholesterol panel looks healthy.     -Liver and gallbladder tests are normal for you. (ALT,AST, Alk phos, bilirubin), kidney function is normal for you (Creatinine, GFR), Sodium is normal, Potassium is normal for you, Calcium is normal for you, Glucose (blood sugar) is normal for you.      -Your complete blood counts including your hemoglobin returned normal for you.         Bottom line:  Your labs look good except for your thyroid test.  Your thyroid blood test TSH indicates that you are getting too little thyroid medication (levothyroxine).  I recommend that we increase your levothyroxine dose from 88 mcg to 100 mcg daily and that we recheck your thyroid blood test (TSH) after you have been taking the new levothyroxine dose for 2 months.  You can schedule a lab appointment for that purpose.  I have sent an prescritpion for the new levothyroxine dose to your pharmacy.           Follow up:  Lab appointment in 2 months for TSH recheck on new dose of levothyroxine       Sincerely,    Dr. Mccabe

## 2017-12-11 DIAGNOSIS — E03.9 HYPOTHYROIDISM, UNSPECIFIED TYPE: ICD-10-CM

## 2017-12-11 PROCEDURE — 84443 ASSAY THYROID STIM HORMONE: CPT | Performed by: INTERNAL MEDICINE

## 2017-12-11 PROCEDURE — 36415 COLL VENOUS BLD VENIPUNCTURE: CPT | Performed by: INTERNAL MEDICINE

## 2017-12-11 NOTE — LETTER
[facility]  [address]  [address]  [place]  [address]  Tel: [phone]    December 12, 2017    Keyonna De La Garza  [address] [facility] [address]        Dear Ms. De La Garza,    The following letter pertains to your most recent diagnostic tests:    -TSH (thyroid stimulating hormone) level is normal which indicates normal circulating thyroid hormone levels.        Bottom line:  Continue you current levothyroxine dose.      Follow up:  Recheck TSH blood test in 6 months     Sincerely,    Yosef Mccabe MD/NOHELIAL          Enclosure: Lab Results  Results for orders placed or performed in visit on 12/11/17   **TSH with free T4 reflex FUTURE 2mo   Result Value Ref Range    TSH 1.11 0.40 - 4.00 mU/L

## 2017-12-12 LAB — TSH SERPL DL<=0.005 MIU/L-ACNC: 1.11 MU/L (ref 0.4–4)

## 2017-12-12 NOTE — PROGRESS NOTES
The following letter pertains to your most recent diagnostic tests:    -TSH (thyroid stimulating hormone) level is normal which indicates normal circulating thyroid hormone levels.        Bottom line:  Continue you current levothyroxine dose.      Follow up:  Recheck TSH blood test in 6 months       Sincerely,    Dr. Mccabe

## 2018-02-04 ENCOUNTER — HEALTH MAINTENANCE LETTER (OUTPATIENT)
Age: 68
End: 2018-02-04

## 2018-02-26 ENCOUNTER — TELEPHONE (OUTPATIENT)
Dept: FAMILY MEDICINE | Facility: CLINIC | Age: 68
End: 2018-02-26

## 2018-02-26 NOTE — TELEPHONE ENCOUNTER
Reason for Call:  Same Day Appointment, Requested Provider:  Yosef Mccabe MD    PCP: Yosef Mccabe    Reason for visit: patient has had a great deal of back pain.  She has been going to chiropractor, but it is not helping.  She needs to get in to get checked to see the back pain could be related to something other than back issues.    Patient scheduled for 3/9 4:00pm.   Wondering if she could be fit in sooner than this because she is in so much pain?    Duration of symptoms: one month    Have you been treated for this in the past? Yes    Additional comments: please call to let patient know if she can get in earlier than 3/9.    Can we leave a detailed message on this number? YES    Phone number patient can be reached at: Home number on file [phone] (home)    Best Time: anytime    Call taken on 2/26/2018 at 10:35 AM by Nicole Saini.

## 2018-02-27 NOTE — PROGRESS NOTES
SUBJECTIVE:   Keyonna De La Graza is a 67 year old female who presents to clinic today for the following health issues:      Back pain since 2-3-18    One month history of severe right lower back pain burning quality   No antecedent injury or trauma  No relief from chiropractic intervention  Pain does not radiate into legs  No leg numbness or weakness  No saddle anesthesia or new bowel or bladder incontinence   No fevers  Mild associated nausea  She wonders if she might have a kidney stone because she was told she had kidney stones on previous unrelated diagnostic tests   Aleve does not help symptoms, but aspirin helps     Problem list and histories reviewed & adjusted, as indicated.  Additional history: as documented    Patient Active Problem List   Diagnosis     Advanced directives, counseling/discussion     Hypothyroidism     CARDIOVASCULAR SCREENING; LDL GOAL LESS THAN 130     GERD (gastroesophageal reflux disease)     Osteopenia     Cholecystitis     Lumbago     Pain in thoracic spine     Past Surgical History:   Procedure Laterality Date     COLONOSCOPY       LAPAROSCOPIC CHOLECYSTECTOMY  4/24/2013    Procedure: LAPAROSCOPIC CHOLECYSTECTOMY;  Laparoscopic cholecystectomy.;  Surgeon: Bong Pyle MD;  Location:  OR       Social History   Substance Use Topics     Smoking status: Never Smoker     Smokeless tobacco: Never Used     Alcohol use 0.0 oz/week     0 Standard drinks or equivalent per week      Comment: occas     Family History   Problem Relation Age of Onset     C.A.D. Mother      CEREBROVASCULAR DISEASE Father          Current Outpatient Prescriptions   Medication Sig Dispense Refill     levothyroxine (SYNTHROID/LEVOTHROID) 100 MCG tablet Take 1 tablet (100 mcg) by mouth daily Must be seen by new primary provider in planned October visit for additional refills 90 tablet 3     Nasal Moisturizer Combination (OCEAN ULTRA SALINE MIST NA)        ranitidine (ZANTAC) 150 MG tablet Take 1 tablet  "(150 mg) by mouth daily       Allergies   Allergen Reactions     Darvon [Aspirin]        Reviewed and updated as needed this visit by clinical staff       Reviewed and updated as needed this visit by Provider         ROS:  Constitutional, HEENT, cardiovascular, pulmonary, gi and gu (no dysuria, or hematuria) systems are negative, except as otherwise noted.    OBJECTIVE:     /69 (BP Location: Left arm, Patient Position: Chair, Cuff Size: Adult Large)  Pulse 78  Temp 98.1  F (36.7  C) (Tympanic)  Ht 4' 9.5\" (1.461 m)  Wt 148 lb (67.1 kg)  SpO2 99%  BMI 31.47 kg/m2  Body mass index is 31.47 kg/(m^2).  GENERAL: healthy, alert and no distress  RESP: lungs clear to auscultation - no rales, rhonchi or wheezes  CV: regular rate and rhythm, normal S1 S2, no S3 or S4, no murmur, click or rub, no peripheral edema and peripheral pulses strong  ABDOMEN: soft, nontender, no hepatosplenomegaly, no masses and bowel sounds normal   (female): Right-sided costovertebral angle tenderness is noted  MS:No tenderness to palpation over the spinous processes of the thoracic and lumbar spine, deep tendon reflexes are 2+ at the bilateral patella and Achilles tendons, there is 5 out of 5 muscle strength in all lower extremity muscle groups, there is normal sensation to light touch in all lower extremity dermatomes, straight leg raise does not elicit radicular symptoms bilaterally, there was no tenderness to palpation over the paraspinous muscles of the lumbar spine. Gait is normal.   SKIN: no suspicious lesions or rashes  NEURO: Normal strength and tone, mentation intact and speech normal  PSYCH: mentation appears normal, affect normal/bright    Diagnostic Test Results:  CT of the abdomen and pelvis without contrast is pending    ASSESSMENT/PLAN:       1. Right flank pain  Patient presents with a one-month history of flank pain that could be consistent with renal colic, given the duration of her pain, I think imaging is " warranted.  We decided to obtain a noncontrast CT scan to identify a kidney stone if present and to also image the bones of the lumbar spine to make sure that this does not represent a compression fracture or metastatic disease etc.  If the CT scan fails to demonstrate a cause for the patient's pain, consider a trial of physical therapy for suspected lumbar strain.  - CT Abdomen w/o Contrast; Future    FUTURE APPOINTMENTS:       -Pending CT findings    Yosef Mccabe MD  [facility]

## 2018-02-28 ENCOUNTER — OFFICE VISIT (OUTPATIENT)
Dept: FAMILY MEDICINE | Facility: CLINIC | Age: 68
End: 2018-02-28
Payer: COMMERCIAL

## 2018-02-28 VITALS
OXYGEN SATURATION: 99 % | BODY MASS INDEX: 31.07 KG/M2 | HEART RATE: 78 BPM | SYSTOLIC BLOOD PRESSURE: 135 MMHG | DIASTOLIC BLOOD PRESSURE: 69 MMHG | HEIGHT: 58 IN | WEIGHT: 148 LBS | TEMPERATURE: 98.1 F

## 2018-02-28 DIAGNOSIS — R10.9 RIGHT FLANK PAIN: Primary | ICD-10-CM

## 2018-02-28 PROCEDURE — 99214 OFFICE O/P EST MOD 30 MIN: CPT | Performed by: INTERNAL MEDICINE

## 2018-02-28 NOTE — NURSING NOTE
"Chief Complaint   Patient presents with     Back Pain        Initial /69 (BP Location: Left arm, Patient Position: Chair, Cuff Size: Adult Large)  Pulse 78  Temp 98.1  F (36.7  C) (Tympanic)  Ht 4' 9.5\" (1.461 m)  Wt 148 lb (67.1 kg)  SpO2 99%  BMI 31.47 kg/m2 Estimated body mass index is 31.47 kg/(m^2) as calculated from the following:    Height as of this encounter: 4' 9.5\" (1.461 m).    Weight as of this encounter: 148 lb (67.1 kg)..    BP completed using cuff size: large  MEDICATIONS REVIEWED  SOCIAL AND FAMILY HX REVIEWED  Angy Richardson CMA  "

## 2018-02-28 NOTE — MR AVS SNAPSHOT
After Visit Summary   2/28/2018    Keyonna De La Garza    MRN: [id number]           Patient Information     Date Of Birth          1950        Visit Information        Provider Department      2/28/2018 4:30 PM Yosef Mccabe MD [facility]        Today's Diagnoses     Right flank pain    -  1       Follow-ups after your visit        Your next 10 appointments already scheduled     Mar 01, 2018  7:00 AM CST   CT ABDOMEN W/O CONTRAST with SHCT2   [facility] CT ([facility])    [address] [place] [id number] [phone]           Please bring any scans or X-rays taken at other hospitals, if similar tests were done. Also bring a list of your medicines, including vitamins, minerals and over-the-counter drugs. It is safest to leave personal items at home.  Be sure to tell your doctor:   If you have any allergies.   If there s any chance you are pregnant.   If you are breastfeeding.  You do not need to do anything special to prepare for this exam.  Please wear loose clothing, such as a sweat suit or jogging clothes. Avoid snaps, zippers and other metal. We may ask you to undress and put on a hospital gown.              Future tests that were ordered for you today     Open Future Orders        Priority Expected Expires Ordered    CT Abdomen w/o Contrast Routine  2/28/2019 2/28/2018            Who to contact     If you have questions or need follow up information about today's clinic visit or your schedule please contact [facility] directly at [phone].  Normal or non-critical lab and imaging results will be communicated to you by MyChart, letter or phone within 4 business days after the clinic has received the results. If you do not hear from us within 7 days, please contact the clinic through MyChart or phone. If you have a critical or abnormal lab result, we will notify you by phone as soon as possible.  Submit refill requests through  "MyChart or call your pharmacy and they will forward the refill request to us. Please allow 3 business days for your refill to be completed.          Additional Information About Your Visit        MyChart Information     [employer] lets you send messages to your doctor, view your test results, renew your prescriptions, schedule appointments and more. To sign up, go to [email or web address] . Click on \"Log in\" on the left side of the screen, which will take you to the Welcome page. Then click on \"Sign up Now\" on the right side of the page.     You will be asked to enter the access code listed below, as well as some personal information. Please follow the directions to create your username and password.     Your access code is: [email or web address]  Expires: 2018  5:24 PM     Your access code will  in 90 days. If you need help or a new code, please call your [place] clinic or [phone].        Care EveryWhere ID     This is your Care EveryWhere ID. This could be used by other organizations to access your [place] medical records  [id number]        Your Vitals Were     Pulse Temperature Height Pulse Oximetry BMI (Body Mass Index)       78 98.1  F (36.7  C) (Tympanic) 4' 9.5\" (1.461 m) 99% 31.47 kg/m2        Blood Pressure from Last 3 Encounters:   18 135/69   10/02/17 127/74   17 126/68    Weight from Last 3 Encounters:   18 148 lb (67.1 kg)   10/02/17 151 lb (68.5 kg)   17 149 lb (67.6 kg)               Primary Care Provider Office Phone # Fax #    Yosef Mccabe [phone]       [facility] [address] [facility] [[email or web address]] [address]  [facility] [address]        Equal Access to Services     BINDU VALENTINE : John Nogueira, waaxda luqadaha, qaybta kaalmaeleazar leonard. [facility] [phone].    ATENCIÓN: Si habla español, tiene a spring disposición servicios gratuitos de asistencia lingüística. Llame al [phone].    We comply " with applicable federal civil rights laws and [place] laws. We do not discriminate on the basis of race, color, national origin, age, disability, sex, sexual orientation, or gender identity.            Thank you!     Thank you for choosing [facility]  for your care. Our goal is always to provide you with excellent care. Hearing back from our patients is one way we can continue to improve our services. Please take a few minutes to complete the written survey that you may receive in the mail after your visit with us. Thank you!             Your Updated Medication List - Protect others around you: Learn how to safely use, store and throw away your medicines at www.disposemymeds.org.          This list is accurate as of 2/28/18  5:24 PM.  Always use your most recent med list.                   Brand Name Dispense Instructions for use Diagnosis    levothyroxine 100 MCG tablet    SYNTHROID/LEVOTHROID    90 tablet    Take 1 tablet (100 mcg) by mouth daily Must be seen by new primary provider in planned October visit for additional refills    Hypothyroidism, unspecified type       OCEAN ULTRA SALINE MIST NA           ranitidine 150 MG tablet    ZANTAC     Take 1 tablet (150 mg) by mouth daily

## 2018-03-01 ENCOUNTER — HOSPITAL ENCOUNTER (OUTPATIENT)
Dept: CT IMAGING | Facility: CLINIC | Age: 68
Discharge: HOME | End: 2018-03-01
Attending: INTERNAL MEDICINE | Admitting: INTERNAL MEDICINE
Payer: COMMERCIAL

## 2018-03-01 DIAGNOSIS — M54.50 ACUTE RIGHT-SIDED LOW BACK PAIN WITHOUT SCIATICA: Primary | ICD-10-CM

## 2018-03-01 DIAGNOSIS — R10.9 RIGHT FLANK PAIN: ICD-10-CM

## 2018-03-01 PROCEDURE — 74176 CT ABD & PELVIS W/O CONTRAST: CPT

## 2018-03-01 NOTE — PROGRESS NOTES
The following letter pertains to your most recent diagnostic tests:    As I discussed in my phone message to you, there are no dangerous causes for your pain identified by this CT scan.  Try physical therapy for your pain.  Return to see me if your pain does not resolve or improve after several sessions.        Sincerely,    Dr. Mccabe

## 2018-03-01 NOTE — LETTER
[facility]  [address] [facility]  [address]  [place]  [address]  Tel: [phone]    March 1, 2018    Keyonna De La Garza  [address] [facility] [address]        Dear Ms. De La Garza,    The following letter pertains to your most recent diagnostic tests:    As I discussed in my phone message to you, there are no dangerous causes for your pain identified by this CT scan.  Try physical therapy for your pain.  Return to see me if your pain does not resolve or improve after several sessions.      Sincerely,    Yosef Mccabe MD/YAMEL

## 2018-03-01 NOTE — PROGRESS NOTES
Called patient with CT result  No cause for pain identified  Trial of PT for pain  Referral to [facility] placed   Return to clinic if pain worsens with PT or fails to improve after several sessions

## 2018-03-06 ENCOUNTER — THERAPY VISIT (OUTPATIENT)
Dept: PHYSICAL THERAPY | Facility: CLINIC | Age: 68
End: 2018-03-06
Payer: COMMERCIAL

## 2018-03-06 DIAGNOSIS — M54.6 PAIN IN THORACIC SPINE: Primary | ICD-10-CM

## 2018-03-06 PROCEDURE — 97110 THERAPEUTIC EXERCISES: CPT | Mod: GP | Performed by: PHYSICAL THERAPIST

## 2018-03-06 PROCEDURE — 97161 PT EVAL LOW COMPLEX 20 MIN: CPT | Mod: GP | Performed by: PHYSICAL THERAPIST

## 2018-03-07 NOTE — PROGRESS NOTES
[facility] Initial Evaluation    Subjective:  Patient is a 67 year old female presenting with rehab back hpi.   Keyonna De La Garza is a 67 year old female with a thoracic condition.  Condition occurred with:  Insidious onset.  Condition occurred: for unknown reasons.  This is a new condition  2/3/18 patient woke up with R thoracic/flank pain.  She denies specific cause, but noted difficult moving from a forward flexed position to an erect posture.  She notes she was able to after gradually straightening up while standing bent at the sink for a while.  Pain has persisted since then.  .    Patient reports pain:  Thoracic spine right.  Radiates to: intermittent wrapping around lateral to anterior rib path.  Pain is described as aching and is constant and reported as 3/10.  Associated symptoms:  Loss of motion/stiffness. Pain is worse in the A.M..  Symptoms are exacerbated by lifting (prolonged sitting/standing) and relieved by ice and heat (aspirin).  Since onset symptoms are gradually improving.  Special tests:  CT scan (to rule out kidney stone and was negative).  Previous treatment includes chiropractic.  There was mild improvement following previous treatment.  General health as reported by patient is good.  Pertinent medical history includes:  Osteoarthritis and thyroid problems (headaches).  Medical allergies: darvon.  Other surgeries include:  No.  Medication history: thyroid.  Current occupation is marketing.  Patient is working in normal job without restrictions.  Primary job tasks include:  Prolonged sitting (computer work).    Barriers include:  None as reported by the patient.    Red flags:  None as reported by the patient.                        Objective:  THORACIC:    Posture: fair, thoracic kyphosis noted   Posture Correction: no effect    Neurological:    Motor Deficit, Sensory Deficit, Reflexes, Dural Signs: intact    Thoracic AROM: (Major, Moderate, Minimal or Nil loss)  Movement  Loss Edilberto Mod Min Nil Pain   Flexion    X Pain interscapular, R thoracic   Extension   X  No effect   Rotation L   X  No effect   Rotation R   X  No effect   Other          Cervical Differential Testing: screen with trace pain with retraction and extension, did not test repeated.     Repeated movement testing (thoracic):   (During: produces, abolishes, increases, decreases, no effect, centralizing, peripheralizing; After: better, worse, no better, no worse, no effect, centralized, peripheralized)    Pre-test Symptoms Sitting: 3/10 R thoracic pain     Symptoms During Symptoms After ROM increased ROM decreased No Effect   FLEX        Rep FLEX        EXT        Rep Ext Increased R thoracic to 4/10  No worse   X   ROT - R        Rep ROT - R        ROT - L        Rep ROT - L        Other:          Other Tests: prone on elbows 3/10.  P/A Gr I mobs into ext, some decreased pain/increased motion with seated thoracic extension after.  However, she's pretty tender along spine, so kept to low grade.   Static Tests: Prone on elbows no effect.     Provisional Classification: mechanically inconclusive, suspected thoracic derangement  Principle of Management: will initiate repeated extension in sitting x 10 reps vs prone on elbows x 2 minutes; ever few hours 4x/day or more.  Will also initiate scapular stabilization exercises and postural education.     System    Physical Exam    General     ROS    Assessment/Plan:    Patient is a 67 year old female with thoracic complaints.    Patient has the following significant findings with corresponding treatment plan.                Diagnosis 1:  R thoracic pain    Pain -  hot/cold therapy, manual therapy and directional preference exercise  Decreased ROM/flexibility - manual therapy and therapeutic exercise  Decreased strength - therapeutic exercise and therapeutic activities  Decreased proprioception - neuro re-education and therapeutic activities  Decreased function - therapeutic  activities  Impaired posture - neuro re-education    Therapy Evaluation Codes:   1) History comprised of:   Personal factors that impact the plan of care:      None.    Comorbidity factors that impact the plan of care are:      None.     Medications impacting care: None.  2) Examination of Body Systems comprised of:   Body structures and functions that impact the plan of care:      Thoracic Spine.   Activity limitations that impact the plan of care are:      Bending, Lifting, Sitting and Standing.  3) Clinical presentation characteristics are:   Stable/Uncomplicated.  4) Decision-Making    Low complexity using standardized patient assessment instrument and/or measureable assessment of functional outcome.  Cumulative Therapy Evaluation is: Low complexity.    Previous and current functional limitations:  (See Goal Flow Sheet for this information)    Short term and Long term goals: (See Goal Flow Sheet for this information)     Communication ability:  Patient appears to be able to clearly communicate and understand verbal and written communication and follow directions correctly.  Treatment Explanation - The following has been discussed with the patient:   RX ordered/plan of care  Anticipated outcomes  Possible risks and side effects  This patient would benefit from PT intervention to resume normal activities.   Rehab potential is good.    Frequency:  1 X week, once daily  Duration:  for 6 weeks  Discharge Plan:  Achieve all LTG.  Independent in home treatment program.  Reach maximal therapeutic benefit.    Please refer to the daily flowsheet for treatment today, total treatment time and time spent performing 1:1 timed codes.

## 2018-03-22 ENCOUNTER — THERAPY VISIT (OUTPATIENT)
Dept: PHYSICAL THERAPY | Facility: CLINIC | Age: 68
End: 2018-03-22
Payer: COMMERCIAL

## 2018-03-22 DIAGNOSIS — M54.6 PAIN IN THORACIC SPINE: ICD-10-CM

## 2018-03-22 PROCEDURE — 97140 MANUAL THERAPY 1/> REGIONS: CPT | Mod: GP | Performed by: PHYSICAL THERAPIST

## 2018-03-22 PROCEDURE — 97110 THERAPEUTIC EXERCISES: CPT | Mod: GP | Performed by: PHYSICAL THERAPIST

## 2018-03-22 PROCEDURE — 97112 NEUROMUSCULAR REEDUCATION: CPT | Mod: GP | Performed by: PHYSICAL THERAPIST

## 2018-04-04 ENCOUNTER — THERAPY VISIT (OUTPATIENT)
Dept: PHYSICAL THERAPY | Facility: CLINIC | Age: 68
End: 2018-04-04
Payer: COMMERCIAL

## 2018-04-04 DIAGNOSIS — M54.6 PAIN IN THORACIC SPINE: ICD-10-CM

## 2018-04-04 PROCEDURE — 97112 NEUROMUSCULAR REEDUCATION: CPT | Mod: GP | Performed by: PHYSICAL THERAPIST

## 2018-04-04 PROCEDURE — 97110 THERAPEUTIC EXERCISES: CPT | Mod: GP | Performed by: PHYSICAL THERAPIST

## 2018-04-04 NOTE — LETTER
[facility]  [address]  [facility] [address]  [phone]    2018    Re: Keyonna De La Garza   :   1950  MRN:  [id number]   REFERRING PHYSICIAN:   Yosef Mccabe    [facility]    Date of Initial Evaluation:  3/6/18  Visits:     Reason for Referral:  Pain in thoracic spine    PROGRESS  REPORT    Progress reporting period is from 3/6/18 to 18 (3 visits).       SUBJECTIVE  Subjective changes noted by patient:  Patient returns to clinic noting that her thoracic back pain is slightly better overall, in that she's now having more good days than bad. Still does have pain though.  She notes most of her exercises are going fine.  Of note is that she is experiencing additional symptoms over the past 2+ weeks.  She describes nausea, decreased appetite, heartburn (after eating only a small amount of food), burning type pain and some sensitivity of clothing on her skin.  Pain has moved around a bit as well.  She now describes it as being either right or left side, sometimes wrapping around the anterior side of the thorax and on occasion to the left upper quadrant as well.  She doesn't feel that her exercises exacerbate these things, so plans to continue HEP, check back in PT in a week, but follow up with her MD in the mean time for further consultation..       Current pain level is 3/10  .     Previous pain level was  3/10 Initial Pain level: 3/10.   Changes in function:  Yes (See Goal flowsheet attached for changes in current functional level)  Adverse reaction to treatment or activity: None    OBJECTIVE  Changes noted in objective findings:    THORACIC:    Posture: fair  Posture Correction: no effect    Neurological:    Motor Deficit, Sensory Deficit, Reflexes, Dural Signs: UE myotomes 5/5    Thoracic AROM: (Major, Moderate, Minimal or Nil loss)  Movement Loss Edilberto Mod Min Nil Pain   Flexion    X  Just pulls    Extension   X  Trace discomfort local to spine   Rotation L   X  Trace discomfort local to spine   Rotation R   X  Some pain L thoracic back   Other          PT working on thoracic extension ROM via extension in sitting at chair or laying prone propped on elbows.  Band rows/pull downs.  Scapular stabilization exercises.  Did trial low grade mobilizations and soft tissue massage/mobilization, but she noted she felt burning in that area for the next day or two after, so declines today.       ASSESSMENT/PLAN  Updated problem list and treatment plan: Diagnosis 1:  Thoracic pain (R side initially)  Pain -  hot/cold therapy, electric stimulation, manual therapy and directional preference exercise  Decreased ROM/flexibility - manual therapy and therapeutic exercise  Decreased strength - therapeutic exercise and therapeutic activities  Decreased proprioception - neuro re-education and therapeutic activities  Decreased function - therapeutic activities  Impaired posture - neuro re-education  STG/LTGs have been met or progress has been made towards goals:  Mild improvement in pain overall, with some decrease in frequency of more intense pain.  Assessment of Progress: The patient's condition has potential to improve.  Self Management Plans:  Patient has been instructed in a home treatment program.  I have re-evaluated this patient and find that the nature, scope, duration and intensity of the therapy is appropriate for the medical condition of the patient.  Keyonna continues to require the following intervention to meet STG and LTG's:  PT    Recommendations:  She will check back with Dr Mccabe this week and resume PT next week.  Plan for PT 1x/wk for 3 more weeks currently.     Would benefit from further consultation by her MD regarding additional symptoms of nausea, decreased appetite, heart burn, burning type pain and sensitivity to clothing in thoracic area.     Thank you for your  referral.    INQUIRIES  Therapist: Issa Turner   [facility]  [address]  [facility] [address]  Phone: [phone]  Fax: [phone]

## 2018-04-05 NOTE — PROGRESS NOTES
Subjective:  HPI                    Objective:  System    Physical Exam    General     ROS    Assessment/Plan:    PROGRESS  REPORT    Progress reporting period is from 3/6/18 to 4/4/18 (3 visits).       SUBJECTIVE  Subjective changes noted by patient:  Patient returns to clinic noting that her thoracic back pain is slightly better overall, in that she's now having more good days than bad. Still does have pain though.  She notes most of her exercises are going fine.  Of note is that she is experiencing additional symptoms over the past 2+ weeks.  She describes nausea, decreased appetite, heartburn (after eating only a small amount of food), burning type pain and some sensitivity of clothing on her skin.  Pain has moved around a bit as well.  She now describes it as being either right or left side, sometimes wrapping around the anterior side of the thorax and on occasion to the left upper quadrant as well.  She doesn't feel that her exercises exacerbate these things, so plans to continue HEP, check back in PT in a week, but follow up with her MD in the mean time for further consultation..       Current pain level is 3/10  .     Previous pain level was  3/10 Initial Pain level: 3/10.   Changes in function:  Yes (See Goal flowsheet attached for changes in current functional level)  Adverse reaction to treatment or activity: None    OBJECTIVE  Changes noted in objective findings:    THORACIC:    Posture: fair  Posture Correction: no effect    Neurological:    Motor Deficit, Sensory Deficit, Reflexes, Dural Signs: UE myotomes 5/5    Thoracic AROM: (Major, Moderate, Minimal or Nil loss)  Movement Loss Edilberto Mod Min Nil Pain   Flexion    X Just pulls    Extension   X  Trace discomfort local to spine   Rotation L   X  Trace discomfort local to spine   Rotation R   X  Some pain L thoracic back   Other          PT working on thoracic extension ROM via extension in sitting at chair or laying prone propped on elbows.  Band  rows/pull downs.  Scapular stabilization exercises.  Did trial low grade mobilizations and soft tissue massage/mobilization, but she noted she felt burning in that area for the next day or two after, so declines today.       ASSESSMENT/PLAN  Updated problem list and treatment plan: Diagnosis 1:  Thoracic pain (R side initially)  Pain -  hot/cold therapy, electric stimulation, manual therapy and directional preference exercise  Decreased ROM/flexibility - manual therapy and therapeutic exercise  Decreased strength - therapeutic exercise and therapeutic activities  Decreased proprioception - neuro re-education and therapeutic activities  Decreased function - therapeutic activities  Impaired posture - neuro re-education  STG/LTGs have been met or progress has been made towards goals:  Mild improvement in pain overall, with some decrease in frequency of more intense pain.  Assessment of Progress: The patient's condition has potential to improve.  Self Management Plans:  Patient has been instructed in a home treatment program.  I have re-evaluated this patient and find that the nature, scope, duration and intensity of the therapy is appropriate for the medical condition of the patient.  Keyonna continues to require the following intervention to meet STG and LTG's:  PT    Recommendations:  She will check back with Dr Mccabe this week and resume PT next week.  Plan for PT 1x/wk for 3 more weeks currently.     Would benefit from further consultation by her MD regarding additional symptoms of nausea, decreased appetite, heart burn, burning type pain and sensitivity to clothing in thoracic area.     Please refer to the daily flowsheet for treatment today, total treatment time and time spent performing 1:1 timed codes.

## 2018-04-11 ENCOUNTER — THERAPY VISIT (OUTPATIENT)
Dept: PHYSICAL THERAPY | Facility: CLINIC | Age: 68
End: 2018-04-11
Payer: COMMERCIAL

## 2018-04-11 DIAGNOSIS — M54.6 PAIN IN THORACIC SPINE: ICD-10-CM

## 2018-04-11 PROCEDURE — 97140 MANUAL THERAPY 1/> REGIONS: CPT | Mod: GP | Performed by: PHYSICAL THERAPIST

## 2018-04-11 PROCEDURE — 97112 NEUROMUSCULAR REEDUCATION: CPT | Mod: GP | Performed by: PHYSICAL THERAPIST

## 2018-04-11 PROCEDURE — 97110 THERAPEUTIC EXERCISES: CPT | Mod: GP | Performed by: PHYSICAL THERAPIST

## 2018-04-12 ENCOUNTER — OFFICE VISIT (OUTPATIENT)
Dept: FAMILY MEDICINE | Facility: CLINIC | Age: 68
End: 2018-04-12
Payer: COMMERCIAL

## 2018-04-12 VITALS
BODY MASS INDEX: 30.86 KG/M2 | HEIGHT: 58 IN | OXYGEN SATURATION: 99 % | DIASTOLIC BLOOD PRESSURE: 70 MMHG | WEIGHT: 147 LBS | SYSTOLIC BLOOD PRESSURE: 130 MMHG | TEMPERATURE: 98.7 F | HEART RATE: 76 BPM

## 2018-04-12 DIAGNOSIS — K29.70 GASTRITIS WITHOUT BLEEDING, UNSPECIFIED CHRONICITY, UNSPECIFIED GASTRITIS TYPE: ICD-10-CM

## 2018-04-12 DIAGNOSIS — M54.6 ACUTE BILATERAL THORACIC BACK PAIN: Primary | ICD-10-CM

## 2018-04-12 PROCEDURE — 99214 OFFICE O/P EST MOD 30 MIN: CPT | Performed by: INTERNAL MEDICINE

## 2018-04-12 NOTE — NURSING NOTE
"Chief Complaint   Patient presents with     Follow Up For     right flank pain       Initial /70 (BP Location: Right arm, Cuff Size: Adult Large)  Pulse 76  Temp 98.7  F (37.1  C) (Tympanic)  Ht 4' 9.5\" (1.461 m)  Wt 147 lb (66.7 kg)  SpO2 99%  Breastfeeding? No  BMI 31.26 kg/m2 Estimated body mass index is 31.26 kg/(m^2) as calculated from the following:    Height as of this encounter: 4' 9.5\" (1.461 m).    Weight as of this encounter: 147 lb (66.7 kg).  Medication Reconciliation: complete   Mary Rodriguez MA      "

## 2018-04-12 NOTE — PROGRESS NOTES
"  SUBJECTIVE:   Keyonna De La Garza is a 67 year old female who presents to clinic today for the following health issues:    Follow Up right flank pain. Still having issues - now has additional GI symptoms - not sure it is related. Has been doing her PT, but does not seem to be improving. States she \"feels 89 years old\".     Pleasant 67-year-old female who saw me at the end of February with complaints of right flank pain  She was referred for a CT scan of the abdomen because her symptoms were thought to be suspicious for nephrolithiasis or renal colic  Her CT scan did not show any significant nephrolithiasis, hydronephrosis or hydro-ureter.  Her CT scan did not show any acute findings to explain her flank pain.  She was referred to physical therapy and has noted some improvement in her pain with physical therapy interventions.  However, her pain remains severe.  Today, she localizes her pain to the paraspinous areas of her lower thoracic spine.  She states that the pain sometimes radiates around to her abdomen on both sides.  She has been taking aspirin for the pain and it seems to help.  However, more recently, she developed stomach upset including a sense of fullness in her abdomen and mild nausea without vomiting.  She denies blood in her stools or melena.  There has been no weight loss.    Problem list and histories reviewed & adjusted, as indicated.  Additional history: as documented    Patient Active Problem List   Diagnosis     Advanced directives, counseling/discussion     Hypothyroidism     CARDIOVASCULAR SCREENING; LDL GOAL LESS THAN 130     GERD (gastroesophageal reflux disease)     Osteopenia     Cholecystitis     Lumbago     Pain in thoracic spine     Past Surgical History:   Procedure Laterality Date     COLONOSCOPY       LAPAROSCOPIC CHOLECYSTECTOMY  4/24/2013    Procedure: LAPAROSCOPIC CHOLECYSTECTOMY;  Laparoscopic cholecystectomy.;  Surgeon: Bong Pyle MD;  Location:  OR       [facility] " "History   Substance Use Topics     Smoking status: Never Smoker     Smokeless tobacco: Never Used     Alcohol use 0.0 oz/week     0 Standard drinks or equivalent per week      Comment: occas     Family History   Problem Relation Age of Onset     C.A.D. Mother      CEREBROVASCULAR DISEASE Father          Current Outpatient Prescriptions   Medication Sig Dispense Refill     levothyroxine (SYNTHROID/LEVOTHROID) 100 MCG tablet Take 1 tablet (100 mcg) by mouth daily Must be seen by new primary provider in planned October visit for additional refills 90 tablet 3     Nasal Moisturizer Combination (OCEAN ULTRA SALINE MIST NA)        ranitidine (ZANTAC) 150 MG tablet Take 1 tablet (150 mg) by mouth daily       Allergies   Allergen Reactions     Darvon [Aspirin]        Reviewed and updated as needed this visit by clinical staff  Tobacco  Allergies  Meds       Reviewed and updated as needed this visit by Provider         ROS:  Constitutional, HEENT, cardiovascular, pulmonary, gi and gu systems are negative, except as otherwise noted.    OBJECTIVE:     /70 (BP Location: Right arm, Cuff Size: Adult Large)  Pulse 76  Temp 98.7  F (37.1  C) (Tympanic)  Ht 4' 9.5\" (1.461 m)  Wt 147 lb (66.7 kg)  SpO2 99%  Breastfeeding? No  BMI 31.26 kg/m2  Body mass index is 31.26 kg/(m^2).  GENERAL: healthy, alert and no distress  MS: There is no tenderness with palpation over the midline thoracic or lumbar spine.  There is no tenderness to palpation over the paraspinous muscles of the thoracic and lumbar spine.  There is no rash overlying the area of described pain.  There is no costovertebral angle tenderness.  ABDOMINAL:  The abdomen is soft, without distention and non-tender with normal bowel sounds.   NEURO: Normal strength and tone, mentation intact and speech normal  PSYCH: mentation appears normal, affect normal/bright    Diagnostic Test Results:  MRI pending     ASSESSMENT/PLAN:         ICD-10-CM    1. Acute bilateral " thoracic back pain M54.6 MR Thoracic Spine w Contrast   2. Gastritis without bleeding, unspecified chronicity, unspecified gastritis type K29.70      I suspect her gastrointestinal symptoms are related to stomach irritation from taking too much aspirin.  See patient instructions.  If symptoms do not resolve with stopping aspirin and a 14 day course of proton pump inhibitor, then refer for EGD.  Check MRI of the thoracic spine to exclude fracture, malignancy, disc herniation syndrome as a potential cause of her pain.  Return to clinic to follow-up on GI symptoms and discuss results of MRI as per patient instructions.  Total time greater than 25 minutes more than 50% of which was spent counseling and coordinating care.  Patient Instructions   Stop taking aspirin because it is irritating your stomach.    To heal your stomach, stop zantac and start over the counter Prilosec once daily for 14 days, 30 minutes prior to breakfast on empty stomach.    To help your pain try Tylenol (acetaminophen).  You should take 1000mg ([id number] tablets) three times per day to control your pain.       Schedule the MRI of your back as soon as you can.  Please call [facility] at [phone] to schedule your imaging test.     Return to see me after your MRI in about 7-14 days to check on your stomach symptoms and follow up on the results of your MRI.      Yosef Mccabe MD  [facility]

## 2018-04-12 NOTE — MR AVS SNAPSHOT
After Visit Summary   4/12/2018    Keyonna De La Garza    MRN: [id number]           Patient Information     Date Of Birth          1950        Visit Information        Provider Department      4/12/2018 6:30 PM Yosef Mccabe MD [facility]        Today's Diagnoses     Acute bilateral thoracic back pain    -  1      Care Instructions    Stop taking aspirin because it is irritating your stomach.    To heal your stomach, stop zantac and start over the counter Prilosec once daily for 14 days, 30 minutes prior to breakfast on empty stomach.    To help your pain try Tylenol (acetaminophen).  You should take 1000mg ([id number] tablets) three times per day to control your pain.       Schedule the MRI of your back as soon as you can.  Please call [place] [facility] at [phone] to schedule your imaging test.     Return to see me after your MRI in about 7-14 days to check on your stomach symptoms and follow up on the results of your MRI.          Follow-ups after your visit        Follow-up notes from your care team     Return in about 14 days (around 4/26/2018) for Routine Visit.      Your next 10 appointments already scheduled     Apr 19, 2018  5:10 PM CDT   [facility] with Issa Turner PT   [facility] ([facility]  )    [address] [place] [address] [address] [place] [id number] [phone]              Future tests that were ordered for you today     Open Future Orders        Priority Expected Expires Ordered    MR Thoracic Spine w Contrast Routine  4/12/2019 4/12/2018            Who to contact     If you have questions or need follow up information about today's clinic visit or your schedule please contact [facility] directly at [phone].  Normal or non-critical lab and imaging results will be communicated to you by MyChart, letter or phone within 4 business days after the clinic has received the results. If you do not hear from us  "within 7 days, please contact the clinic through [employer] or phone. If you have a critical or abnormal lab result, we will notify you by phone as soon as possible.  Submit refill requests through [employer] or call your pharmacy and they will forward the refill request to us. Please allow 3 business days for your refill to be completed.          Additional Information About Your Visit        [email or web address] Information     [employer] lets you send messages to your doctor, view your test results, renew your prescriptions, schedule appointments and more. To sign up, go to [email or web address] . Click on \"Log in\" on the left side of the screen, which will take you to the Welcome page. Then click on \"Sign up Now\" on the right side of the page.     You will be asked to enter the access code listed below, as well as some personal information. Please follow the directions to create your username and password.     Your access code is: [email or web address]  Expires: 2018  6:24 PM     Your access code will  in 90 days. If you need help or a new code, please call your [facility] clinic or [phone].        Care EveryWhere ID     This is your Care EveryWhere ID. This could be used by other organizations to access your [facility] medical records  [id number]        Your Vitals Were     Pulse Temperature Height Pulse Oximetry Breastfeeding? BMI (Body Mass Index)    76 98.7  F (37.1  C) (Tympanic) 4' 9.5\" (1.461 m) 99% No 31.26 kg/m2       Blood Pressure from Last 3 Encounters:   18 130/70   18 135/69   10/02/17 127/74    Weight from Last 3 Encounters:   18 147 lb (66.7 kg)   18 148 lb (67.1 kg)   10/02/17 151 lb (68.5 kg)               Primary Care Provider Office Phone # Fax #    Yosef Mccabe [phone]       [facility] [address]  [place] [address]        Equal Access to Services     BINDU VALENTINE AH: John Nogueira, contreras singh [facility] Hospitalmarlyn romeoFairviewjae" eleazar fisherbobregina la'aan ah. [facility] [phone].    ATENCIÓN: Si habla alyssa, tiene a spring disposición servicios gratuitos de asistencia lingüística. Dejan al [phone].    We comply with applicable federal civil rights laws and [place] laws. We do not discriminate on the basis of race, color, national origin, age, disability, sex, sexual orientation, or gender identity.            Thank you!     Thank you for choosing [facility]  for your care. Our goal is always to provide you with excellent care. Hearing back from our patients is one way we can continue to improve our services. Please take a few minutes to complete the written survey that you may receive in the mail after your visit with us. Thank you!             Your Updated Medication List - Protect others around you: Learn how to safely use, store and throw away your medicines at www.disposemymeds.org.          This list is accurate as of 4/12/18  7:04 PM.  Always use your most recent med list.                   Brand Name Dispense Instructions for use Diagnosis    levothyroxine 100 MCG tablet    SYNTHROID/LEVOTHROID    90 tablet    Take 1 tablet (100 mcg) by mouth daily Must be seen by new primary provider in planned October visit for additional refills    Hypothyroidism, unspecified type       OCEAN ULTRA SALINE MIST NA           ranitidine 150 MG tablet    ZANTAC     Take 1 tablet (150 mg) by mouth daily

## 2018-04-13 NOTE — PATIENT INSTRUCTIONS
Stop taking aspirin because it is irritating your stomach.    To heal your stomach, stop zantac and start over the counter Prilosec once daily for 14 days, 30 minutes prior to breakfast on empty stomach.    To help your pain try Tylenol (acetaminophen).  You should take 1000mg ([id number] tablets) three times per day to control your pain.       Schedule the MRI of your back as soon as you can.  Please call [facility] at [phone] to schedule your imaging test.     Return to see me after your MRI in about 7-14 days to check on your stomach symptoms and follow up on the results of your MRI.

## 2018-04-16 ENCOUNTER — TELEPHONE (OUTPATIENT)
Dept: FAMILY MEDICINE | Facility: CLINIC | Age: 68
End: 2018-04-16

## 2018-04-16 ENCOUNTER — HOSPITAL ENCOUNTER (OUTPATIENT)
Dept: MRI IMAGING | Facility: CLINIC | Age: 68
Discharge: HOME | End: 2018-04-16
Attending: INTERNAL MEDICINE | Admitting: INTERNAL MEDICINE
Payer: COMMERCIAL

## 2018-04-16 DIAGNOSIS — M54.6 ACUTE BILATERAL THORACIC BACK PAIN: ICD-10-CM

## 2018-04-16 DIAGNOSIS — M46.20 OSTEOMYELITIS OF SPINE (H): Primary | ICD-10-CM

## 2018-04-16 PROCEDURE — 72146 MRI CHEST SPINE W/O DYE: CPT

## 2018-04-16 NOTE — PROGRESS NOTES
The following letter pertains to your most recent diagnostic tests:    Your spine MRI shows an abnormality at the thoracic 7 and thoracic 8 vertebral levels.  The radiologists believe that this abnormality represents inflammation from severe degenerative arthritis which would explain your pain.  Although very unlikely, they cannot, however, exclude an infection in the bones of the back based on these findings.  I don't think you have significant risk factors for an infection in your spine, but it would be very helpful to obtain a set of blood that are usually elevated when a bone infection is present (ESR/Sed rate and CRP/C-reactive protein, blood cultures).  You should schedule a lab appointment as soon as possible for that purpose.  If these blood tests are normal, then the likelihood of infection is extremely low, if these blood tests are elevated, then further evaluation for infection, may be necessary.  Regarding the severe degenerative arthritis, we could have you consult with a spine surgeon regarding this finding, but I suggest that we discuss  this further at your follow up appointment.       Sincerely,    Dr. Mccabe

## 2018-04-16 NOTE — LETTER
[facility]  [address] [facility]  [address]  [place]  [address]  Tel: [phone]    April 16, 2018    Keyonna De La Garza  [address] [facility] [address]        Dear Ms. De La Garza,    The following letter pertains to your most recent diagnostic tests:    Your spine MRI shows an abnormality at the thoracic 7 and thoracic 8 vertebral levels.  The radiologists believe that this abnormality represents inflammation from severe degenerative arthritis which would explain your pain.  Although very unlikely, they cannot, however, exclude an infection in the bones of the back based on these findings.  I don't think you have significant risk factors for an infection in your spine, but it would be very helpful to obtain a set of blood that are usually elevated when a bone infection is present (ESR/Sed rate and CRP/C-reactive protein, blood cultures).  You should schedule a lab appointment as soon as possible for that purpose.  If these blood tests are normal, then the likelihood of infection is extremely low, if these blood tests are elevated, then further evaluation for infection, may be necessary.  Regarding the severe degenerative arthritis, we could have you consult with a spine surgeon regarding this finding, but I suggest that we discuss  this further at your follow up appointment.     Sincerely,    Yosef Mccabe MD/[facility]          Enclosure: Lab Results

## 2018-04-16 NOTE — PROGRESS NOTES
Can you please call patient and read her my lab letter and help her set up a lab appointment for the labs I ordered and make sure she has follow up appointment with me

## 2018-04-16 NOTE — TELEPHONE ENCOUNTER
Dr. Mccabe,     I attempted to reach patient and phone rang once and then disconnected-no option to leave message. Attempted to recall and phone line had busy tone.     Patient does have a future appointment with you on 5/3/18. Is this appropriate follow up time with you?   Also, are you wanting RN to read the lab letter from 10/2/17 or 12/11/17 lab results.         Progress notes          Yosef Mccabe MD at 4/16/2018  8:41 AM        Status: Sign at close encounter            Can you please call patient and read her my lab letter and help her set up a lab appointment for the labs I ordered and make sure she has follow up appointment with me

## 2018-04-19 ENCOUNTER — THERAPY VISIT (OUTPATIENT)
Dept: PHYSICAL THERAPY | Facility: CLINIC | Age: 68
End: 2018-04-19
Payer: COMMERCIAL

## 2018-04-19 DIAGNOSIS — M54.6 PAIN IN THORACIC SPINE: ICD-10-CM

## 2018-04-19 PROCEDURE — 97140 MANUAL THERAPY 1/> REGIONS: CPT | Mod: GP | Performed by: PHYSICAL THERAPIST

## 2018-04-19 PROCEDURE — 97014 ELECTRIC STIMULATION THERAPY: CPT | Mod: GP | Performed by: PHYSICAL THERAPIST

## 2018-04-19 PROCEDURE — 97110 THERAPEUTIC EXERCISES: CPT | Mod: GP | Performed by: PHYSICAL THERAPIST

## 2018-04-20 DIAGNOSIS — M46.20 OSTEOMYELITIS OF SPINE (H): ICD-10-CM

## 2018-04-20 LAB
CRP SERPL-MCNC: 9 MG/L (ref 0–8)
ERYTHROCYTE [SEDIMENTATION RATE] IN BLOOD BY WESTERGREN METHOD: 17 MM/H (ref 0–30)

## 2018-04-20 PROCEDURE — 87040 BLOOD CULTURE FOR BACTERIA: CPT | Performed by: INTERNAL MEDICINE

## 2018-04-20 PROCEDURE — 86140 C-REACTIVE PROTEIN: CPT | Performed by: INTERNAL MEDICINE

## 2018-04-20 PROCEDURE — 36415 COLL VENOUS BLD VENIPUNCTURE: CPT | Performed by: INTERNAL MEDICINE

## 2018-04-20 PROCEDURE — 85652 RBC SED RATE AUTOMATED: CPT | Performed by: INTERNAL MEDICINE

## 2018-04-20 NOTE — LETTER
[address] [facility]  [address]  [place]  [address]  Tel: [phone]    April 25, 2018    Keyonna De La Garza  [address] [facility] [address]        Dear Ms. De La Garza,    Good news! Your blood cultures are negative for bacterial infection of the blood stream.  Your inflammatory blood tests (ESR and CRP) are essentially normal making infection of the bones in your thoracic spine very unlikely.       If you have any further questions or problems, please contact our office.      Sincerely,    Yosef Mccabe MD/ Lexy Bryan CMA  Results for orders placed or performed in visit on 04/20/18   CRP, inflammation   Result Value Ref Range    CRP Inflammation 9.0 (H) 0.0 - 8.0 mg/L   ESR: Erythrocyte sedimentation rate   Result Value Ref Range    Sed Rate 17 0 - 30 mm/h   Blood culture   Result Value Ref Range    Specimen Description Blood Left Arm     Special Requests Aerobic and anaerobic bottles received     Culture Micro No growth after 5 days    Blood culture   Result Value Ref Range    Specimen Description Blood Right Arm     Special Requests Aerobic and anaerobic bottles received     Culture Micro No growth after 5 days                Enclosure: Lab Results

## 2018-04-25 NOTE — PROGRESS NOTES
The following letter pertains to your most recent diagnostic tests:    Good news! Your blood cultures are negative for bacterial infection of the blood stream.  Your inflammatory blood tests (ESR and CRP) are essentially normal making infection of the bones in your thoracic spine very unlikely.           Sincerely,    Dr. Mccabe

## 2018-04-26 ENCOUNTER — THERAPY VISIT (OUTPATIENT)
Dept: PHYSICAL THERAPY | Facility: CLINIC | Age: 68
End: 2018-04-26
Payer: COMMERCIAL

## 2018-04-26 DIAGNOSIS — M54.6 PAIN IN THORACIC SPINE: ICD-10-CM

## 2018-04-26 LAB
BACTERIA SPEC CULT: NO GROWTH
BACTERIA SPEC CULT: NO GROWTH
Lab: NORMAL
Lab: NORMAL
SPECIMEN SOURCE: NORMAL
SPECIMEN SOURCE: NORMAL

## 2018-04-26 PROCEDURE — 97110 THERAPEUTIC EXERCISES: CPT | Mod: GP | Performed by: PHYSICAL THERAPIST

## 2018-04-26 PROCEDURE — 97140 MANUAL THERAPY 1/> REGIONS: CPT | Mod: GP | Performed by: PHYSICAL THERAPIST

## 2018-04-26 PROCEDURE — 97014 ELECTRIC STIMULATION THERAPY: CPT | Mod: GP | Performed by: PHYSICAL THERAPIST

## 2018-05-03 ENCOUNTER — OFFICE VISIT (OUTPATIENT)
Dept: FAMILY MEDICINE | Facility: CLINIC | Age: 68
End: 2018-05-03
Payer: COMMERCIAL

## 2018-05-03 VITALS
HEART RATE: 67 BPM | TEMPERATURE: 97.8 F | BODY MASS INDEX: 31.7 KG/M2 | WEIGHT: 151 LBS | SYSTOLIC BLOOD PRESSURE: 138 MMHG | OXYGEN SATURATION: 97 % | HEIGHT: 58 IN | DIASTOLIC BLOOD PRESSURE: 73 MMHG

## 2018-05-03 DIAGNOSIS — G89.29 CHRONIC BILATERAL THORACIC BACK PAIN: Primary | ICD-10-CM

## 2018-05-03 DIAGNOSIS — M54.6 CHRONIC BILATERAL THORACIC BACK PAIN: Primary | ICD-10-CM

## 2018-05-03 PROCEDURE — 99214 OFFICE O/P EST MOD 30 MIN: CPT | Performed by: INTERNAL MEDICINE

## 2018-05-03 NOTE — PROGRESS NOTES
SUBJECTIVE:   Keyonna De La Garza is a 67 year old female who presents to clinic today for the following health issues:      Follow up on results of MRI and lab work    Thoracic pain is consistently improving with physical therapy interventions.  She localizes the pain to the middle of her shoulder blades.  The pain that radiates around the front of her body has resolved completely.  She still has severe pain localized to her midline thoracic spine associated with going over bumps in her car.    Her abdominal pain, nausea has resolved completely since stopping aspirin and taking the 14 day course of omeprazole.  She had an MRI that demonstrated abnormal signals in T7 and T8 concerning for infection although her ESR, CRP and blood cultures were essentially normal.  She denies fevers, chills, weight loss, night sweats.  She has found Tylenol helpful in managing her pain.  She denies pain that radiates to her legs and denies leg numbness or weakness.  She denies new bowel or bladder incontinence.    Problem list and histories reviewed & adjusted, as indicated.  Additional history: as documented    Patient Active Problem List   Diagnosis     Advanced directives, counseling/discussion     Hypothyroidism     CARDIOVASCULAR SCREENING; LDL GOAL LESS THAN 130     GERD (gastroesophageal reflux disease)     Osteopenia     Cholecystitis     Lumbago     Pain in thoracic spine     Past Surgical History:   Procedure Laterality Date     COLONOSCOPY       LAPAROSCOPIC CHOLECYSTECTOMY  4/24/2013    Procedure: LAPAROSCOPIC CHOLECYSTECTOMY;  Laparoscopic cholecystectomy.;  Surgeon: Bong Pyle MD;  Location:  OR       Social History   Substance Use Topics     Smoking status: Never Smoker     Smokeless tobacco: Never Used     Alcohol use 0.0 oz/week     0 Standard drinks or equivalent per week      Comment: occas     Family History   Problem Relation Age of Onset     C.A.D. Mother      CEREBROVASCULAR DISEASE Father       "    Current Outpatient Prescriptions   Medication Sig Dispense Refill     levothyroxine (SYNTHROID/LEVOTHROID) 100 MCG tablet Take 1 tablet (100 mcg) by mouth daily Must be seen by new primary provider in planned October visit for additional refills 90 tablet 3     Nasal Moisturizer Combination (OCEAN ULTRA SALINE MIST NA)        ranitidine (ZANTAC) 150 MG tablet Take 1 tablet (150 mg) by mouth daily       Allergies   Allergen Reactions     Darvon [Aspirin]        Reviewed and updated as needed this visit by clinical staff  Allergies  Meds       Reviewed and updated as needed this visit by Provider         ROS:  Pertinent positive and negatives are reviewed and history of present illness the remainder of 7 organ system review of systems is otherwise negative    OBJECTIVE:     /73 (BP Location: Right arm, Patient Position: Sitting, Cuff Size: Adult Regular)  Pulse 67  Temp 97.8  F (36.6  C) (Oral)  Ht 4' 9.5\" (1.461 m)  Wt 151 lb (68.5 kg)  SpO2 97%  BMI 32.11 kg/m2  Body mass index is 32.11 kg/(m^2).  General: This is a well-appearing, improving appearing middle-aged female in no acute distress.  She appears quite comfortable.  Back: There is no midline spine tenderness, there is no rash overlying the area of described pain.    Diagnostic Test Results:  Results for orders placed or performed in visit on 04/20/18   CRP, inflammation   Result Value Ref Range    CRP Inflammation 9.0 (H) 0.0 - 8.0 mg/L   ESR: Erythrocyte sedimentation rate   Result Value Ref Range    Sed Rate 17 0 - 30 mm/h   Blood culture   Result Value Ref Range    Specimen Description Blood Left Arm     Special Requests Aerobic and anaerobic bottles received     Culture Micro No growth    Blood culture   Result Value Ref Range    Specimen Description Blood Right Arm     Special Requests Aerobic and anaerobic bottles received     Culture Micro No growth        ASSESSMENT/PLAN:       1. Chronic bilateral thoracic back pain  Her thoracic " back pain is persistent over the course of almost 3 months now.  Her MRI is not normal but probably reflects degenerative changes in the thoracic spine rather than infection particularly since her blood cultures and inflammatory tests are essentially normal.  To me, it is not quite certain what is causing her persistent and at times severe pain.  I would like her to consult with a spine surgeon to see if they have any other ideas as to how to manage her pain or to further evaluate her pain.  She was referred to Dr. Toan Cruz for an expert opinion.  Until then, she will continue with her physical therapy interventions as they seem to be helping in addition to Tylenol to manage the pain.  Fortunately, her gastritis symptoms seem to have resolved completely with stopping aspirin for pain management and with a temporary course of omeprazole.  - NEUROSURGERY REFERRAL    FUTURE APPOINTMENTS:       -I asked her to return after seeing Dr. Cruz to implement any plans that he might recommend for management of her persistent pain    Yosef Mccabe MD  [facility]

## 2018-05-03 NOTE — MR AVS SNAPSHOT
After Visit Summary   5/3/2018    Keyonna De La Garza    MRN: [id number]           Patient Information     Date Of Birth          1950        Visit Information        Provider Department      5/3/2018 4:30 PM Yosef Mccabe MD [facility]        Today's Diagnoses     Chronic bilateral thoracic back pain    -  1       Follow-ups after your visit        Additional Services     NEUROSURGERY REFERRAL       Your provider has referred you to: [facility] [place] [place] [phone]   [email or web address]    Dr. Julius Cruz     Please be aware that coverage of these services is subject to the terms and limitations of your health insurance plan.  Call member services at your health plan with any benefit or coverage questions.      Please bring the following with you to your appointment:    (1) Any X-Rays, CTs or MRIs which have been performed.  Contact the facility where they were done to arrange for  prior to your scheduled appointment.   (2) List of current medications  (3) This referral request   (4) Any documents/labs given to you for this referral                  Who to contact     If you have questions or need follow up information about today's clinic visit or your schedule please contact [facility] directly at [phone].  Normal or non-critical lab and imaging results will be communicated to you by MyChart, letter or phone within 4 business days after the clinic has received the results. If you do not hear from us within 7 days, please contact the clinic through MyChart or phone. If you have a critical or abnormal lab result, we will notify you by phone as soon as possible.  Submit refill requests through [employer] or call your pharmacy and they will forward the refill request to us. Please allow 3 business days for your refill to be completed.          Additional Information About Your Visit        MyChart  "Information     [employer] lets you send messages to your doctor, view your test results, renew your prescriptions, schedule appointments and more. To sign up, go to [email or web address] . Click on \"Log in\" on the left side of the screen, which will take you to the Welcome page. Then click on \"Sign up Now\" on the right side of the page.     You will be asked to enter the access code listed below, as well as some personal information. Please follow the directions to create your username and password.     Your access code is: [email or web address]  Expires: 2018  6:24 PM     Your access code will  in 90 days. If you need help or a new code, please call your [place] clinic or [phone].        Care EveryWhere ID     This is your [facility] EveryWhere ID. This could be used by other organizations to access your [place] medical records  [id number]        Your Vitals Were     Pulse Temperature Height Pulse Oximetry BMI (Body Mass Index)       67 97.8  F (36.6  C) (Oral) 4' 9.5\" (1.461 m) 97% 32.11 kg/m2        Blood Pressure from Last 3 Encounters:   18 138/73   18 130/70   18 135/69    Weight from Last 3 Encounters:   18 151 lb (68.5 kg)   18 147 lb (66.7 kg)   18 148 lb (67.1 kg)              We Performed the Following     NEUROSURGERY REFERRAL        Primary Care Provider Office Phone # Fax #    Yosef Mccabe [phone] [address]  [place] [address]        Equal Access to Services     [facility] : Hadii david Nogueira, warhonada luqadaha, qaybta kaalmajae fisher, eleazar pisano . So [facility] [phone].    ATENCIÓN: Si habla español, tiene a spring disposición servicios gratuitos de asistencia lingüística. Llame al [phone].    We comply with applicable federal civil rights laws and [place] laws. We do not discriminate on the basis of race, color, national origin, age, disability, sex, sexual orientation, or " gender identity.            Thank you!     Thank you for choosing [facility]  for your care. Our goal is always to provide you with excellent care. Hearing back from our patients is one way we can continue to improve our services. Please take a few minutes to complete the written survey that you may receive in the mail after your visit with us. Thank you!             Your Updated Medication List - Protect others around you: Learn how to safely use, store and throw away your medicines at www.disposemymeds.org.          This list is accurate as of 5/3/18  5:12 PM.  Always use your most recent med list.                   Brand Name Dispense Instructions for use Diagnosis    levothyroxine 100 MCG tablet    SYNTHROID/LEVOTHROID    90 tablet    Take 1 tablet (100 mcg) by mouth daily Must be seen by new primary provider in planned October visit for additional refills    Hypothyroidism, unspecified type       OCEAN ULTRA SALINE MIST NA           ranitidine 150 MG tablet    ZANTAC     Take 1 tablet (150 mg) by mouth daily

## 2018-05-23 ENCOUNTER — OFFICE VISIT (OUTPATIENT)
Dept: NEUROSURGERY | Facility: CLINIC | Age: 68
End: 2018-05-23
Attending: INTERNAL MEDICINE
Payer: COMMERCIAL

## 2018-05-23 VITALS
HEART RATE: 63 BPM | DIASTOLIC BLOOD PRESSURE: 77 MMHG | OXYGEN SATURATION: 98 % | SYSTOLIC BLOOD PRESSURE: 147 MMHG | TEMPERATURE: 97.5 F

## 2018-05-23 DIAGNOSIS — M54.6 CHRONIC BILATERAL THORACIC BACK PAIN: ICD-10-CM

## 2018-05-23 DIAGNOSIS — G89.29 CHRONIC BILATERAL THORACIC BACK PAIN: ICD-10-CM

## 2018-05-23 PROCEDURE — G0463 HOSPITAL OUTPT CLINIC VISIT: HCPCS | Performed by: NURSE PRACTITIONER

## 2018-05-23 PROCEDURE — 99243 OFF/OP CNSLTJ NEW/EST LOW 30: CPT | Performed by: NURSE PRACTITIONER

## 2018-05-23 ASSESSMENT — PAIN SCALES - GENERAL: PAINLEVEL: MILD PAIN (2)

## 2018-05-23 NOTE — MR AVS SNAPSHOT
After Visit Summary   5/23/2018    Keyonna De La Garza    MRN: [id number]           Patient Information     Date Of Birth          1950        Visit Information        Provider Department      5/23/2018 2:50 PM Naila Dewey NP [facility] Neurosurgery [facility]        Today's Diagnoses     Chronic bilateral thoracic back pain          Care Instructions    Please contact the clinic if pain persists at [phone].            Follow-ups after your visit        Your next 10 appointments already scheduled     May 30, 2018  3:30 PM CDT   [facility] with Issa Turner PT   [facility] ([facility] [place]  )    [address]  [place] [address] [phone]            Jun 06, 2018  4:00 PM CDT   Office Visit with Yosef Mccabe MD   [facility]    [address] [id number] [phone]           Bring a current list of meds and any records pertaining to this visit. For Physicals, please bring immunization records and any forms needing to be filled out. Please arrive 10 minutes early to complete paperwork.              Who to contact     If you have questions or need follow up information about today's clinic visit or your schedule please contact [facility] NEUROSURGERY [facility] directly at [phone].  Normal or non-critical lab and imaging results will be communicated to you by MyChart, letter or phone within 4 business days after the clinic has received the results. If you do not hear from us within 7 days, please contact the clinic through MyChart or phone. If you have a critical or abnormal lab result, we will notify you by phone as soon as possible.  Submit refill requests through [employer] or call your pharmacy and they will forward the refill request to us. Please allow 3 business days for your refill to be completed.          Additional Information About Your Visit         Care EveryWhere ID     This is your Care EveryWhere ID. This could be used by other organizations to access your [facility] medical records  [id number]        Your Vitals Were     Pulse Temperature Pulse Oximetry Breastfeeding?          63 97.5  F (36.4  C) (Oral) 98% No         Blood Pressure from Last 3 Encounters:   05/23/18 147/77   05/03/18 138/73   04/12/18 130/70    Weight from Last 3 Encounters:   05/03/18 151 lb (68.5 kg)   04/12/18 147 lb (66.7 kg)   02/28/18 148 lb (67.1 kg)              Today, you had the following     No orders found for display       Primary Care Provider Office Phone # Fax #    Yosef Mccabe [phone] [address] [facility] [address]  [place] [address]        Equal Access to Services     [facility]: Hadii aad ku hadasho Soomaali, waaxda luqadaha, qaybta kaalmada adeegyada, eleazar pisano . So [facility] [phone].    ATENCIÓN: Si habla español, tiene a srping disposición servicios gratuitos de asistencia lingüística. Dejan al [phone].    We comply with applicable federal civil rights laws and [place] laws. We do not discriminate on the basis of race, color, national origin, age, disability, sex, sexual orientation, or gender identity.            Thank you!     Thank you for choosing [facility] NEUROSURGERY [facility]  for your care. Our goal is always to provide you with excellent care. Hearing back from our patients is one way we can continue to improve our services. Please take a few minutes to complete the written survey that you may receive in the mail after your visit with us. Thank you!             Your Updated Medication List - Protect others around you: Learn how to safely use, store and throw away your medicines at www.disposemymeds.org.          This list is accurate as of 5/23/18  3:30 PM.  Always use your most recent med list.                   Brand Name Dispense Instructions for use Diagnosis    levothyroxine 100 MCG  tablet    SYNTHROID/LEVOTHROID    90 tablet    Take 1 tablet (100 mcg) by mouth daily Must be seen by new primary provider in planned October visit for additional refills    Hypothyroidism, unspecified type       OCEAN ULTRA SALINE MIST NA           ranitidine 150 MG tablet    ZANTAC     Take 1 tablet (150 mg) by mouth daily

## 2018-05-23 NOTE — PROGRESS NOTES
Dr. Toan Cruz  [facility]  Neurosurgery Clinic Visit      CC: Mid back pain    Primary care Provider: Yosef Mccabe      Reason For Visit:   I was asked by Dr. Mccabe to consult on the patient for chronic bilateral thoracic pain.      HPI: Keyonna De La Garza is a 67 year old female with a history of mid-back pain since awakening on 2/3/18.  She states she woke up that morning and couldn't stand due to pain.  She states the pain continued thru early April. She saw a chiropractor without benefit and subsequently underwent CT first and then MRI.  MRI on 4/16/18 showed abnormalities at T7-8.  She underwent a work-up, including full labs to r/o infection.  Results came back not indication infection.  She has remained afebrile and denies N/V, chills or general malaise. She has since had PT with some benefit.  She states the pain is a dull pain that increases with reaching and twisting.  She denies lumbar radicular symptoms.  She denies weakness to BLE or changes to bowel or bladder.    Pain right now:  2    Past Medical History:   Diagnosis Date     Abdominal pain, other specified site 2013    RUQ     Gallstones 2013     GERD (gastroesophageal reflux disease)      Hypothyroidism        Past Medical History reviewed with patient during visit.    Past Surgical History:   Procedure Laterality Date     COLONOSCOPY       LAPAROSCOPIC CHOLECYSTECTOMY  4/24/2013    Procedure: LAPAROSCOPIC CHOLECYSTECTOMY;  Laparoscopic cholecystectomy.;  Surgeon: Bong Pyle MD;  Location: [facility] OR     Past Surgical History reviewed with patient during visit.    Current Outpatient Prescriptions   Medication     levothyroxine (SYNTHROID/LEVOTHROID) 100 MCG tablet     Nasal Moisturizer Combination (OCEAN ULTRA SALINE MIST NA)     ranitidine (ZANTAC) 150 MG tablet     No current facility-administered medications for this visit.        Allergies   Allergen Reactions     Darvon [Aspirin]        Social History      Social History     Marital status: Single     Spouse name: N/A     Number of children: N/A     Years of education: N/A     Social History Main Topics     Smoking status: Never Smoker     Smokeless tobacco: Never Used     Alcohol use 0.0 oz/week     0 Standard drinks or equivalent per week      Comment: occas     Drug use: No     Sexual activity: Not Currently     Partners: Male     Other Topics Concern     Parent/Sibling W/ Cabg, Mi Or Angioplasty Before 65f 55m? No     Social History Narrative    Lives alone in single level home. Does care for 95 yo mother primarily.       Family History   Problem Relation Age of Onset     C.A.D. Mother      CEREBROVASCULAR DISEASE Father          ROS: 10 point ROS neg other than the symptoms noted above in the HPI.    Vital Signs: There were no vitals taken for this visit.    Examination:  Constitutional:  Alert, well nourished, NAD.  HEENT: Normocephalic, atraumatic.   Pulm:  Without shortness of breath   CV:  No pitting edema of BLE.    Neurological:  Awake  Alert  Oriented x 3  Speech clear  Cranial nerves II - XII intact    Motor exam   Shoulder Abduction:  Right:  5/5   Left:  5/5  Biceps:                      Right:  5/5   Left:  5/5  Triceps:                     Right:  5/5   Left:  5/5  Wrist Extensors:       Right:  5/5   Left:  5/5  Wrist Flexors:           Right:  5/5   Left:  5/5  Intrinsics:                   Right:  5/5   Left:  5/5  Hip Flexor:                Right: 5/5  Left:  5/5  Hip Adductor:             Right:  5/5  Left:  5/5  Hip Abductor:             Right:  5/5  Left:  5/5  Gastroc Soleus:        Right:  5/5  Left:  5/5  Tib/Ant:                      Right:  5/5  Left:  5/5  EHL:                          Right:  5/5  Left:  5/5   Sensation normal to bilateral upper and lower extremities  Clonus negative  DTRs 1+ symmetric    Gait: Able to stand from a seated position. Normal non-antalgic, non-myelopathic gait.  Able to heel/toe walk without loss of  balance  Cervical examination reveals good range of motion.  No tenderness to palpation of the cervical spine or paraspinous muscles bilaterally.    Thoracic examination reveals tenderness to mid thoracic spine, no paraspinous tenderness.    Lumbar examination reveals no tenderness of the spine or paraspinous muscles.  Hip height is symmetrical. Negative SI joint, sciatic notch or greater trochanteric tenderness to palpation bilaterally.  Straight leg raise is negative bilaterally.      Imaging:      IMPRESSION: Abnormality of the T7 and T8 vertebrae with relative sparing of the T7-T8 disc space as described above. Exuberant degenerative disease is most likely but infection cannot be entirely excluded. Please see description above. Interdepartmental consultation was obtained from a musculoskeletal radiologist who concurs.    Assessment/Plan:   Thoracic DDD    Keyonna De La Garza is a 67 year old female with a history of mid-back pain since awakening on 2/3/18.  She states she woke up that morning and couldn't stand due to pain.  She states the pain continued thru early April. She saw a chiropractor without benefit and subsequently underwent CT first and then MRI.  MRI on 4/16/18 showed abnormalities at T7-8.  She underwent a work-up, including full labs to r/o infection.  Results came back not indication infection.  She has remained afebrile and denies N/V, chills or general malaise. She has since had PT with some benefit.  She states the pain is a dull pain that increases with reaching and twisting.  She denies lumbar radicular symptoms.  She denies weakness to BLE or changes to bowel or bladder.  We reviewed MRI and CT findings.  We discussed continued PT.  She does not feel the pain is severe and feels she is doing well.  If the pain returns or increases she will contact us      Patient Instructions   Please contact the clinic if pain persists at [phone].          Naila Dewey CNP  Spine [facility] Brain  [address]  [address]  [place] [address]    Tel [phone]  Pager [phone]

## 2018-05-23 NOTE — LETTER
5/23/2018         RE: Keyonna De La Garza  [address] [facility] [address]        Dear Colleague,    Thank you for referring your patient, Keyonna De La Garza, to the [facility] NEUROSURGERY CLINIC. Please see a copy of my visit note below.    Dr. Toan Cruz  [facility]  Neurosurgery Clinic Visit      CC: Mid back pain    Primary care Provider: Yosef Mccabe      Reason For Visit:   I was asked by Dr. Mccabe to consult on the patient for chronic bilateral thoracic pain.      HPI: Keyonna De La Garza is a 67 year old female with a history of mid-back pain since awakening on 2/3/18.  She states she woke up that morning and couldn't stand due to pain.  She states the pain continued thru early April. She saw a chiropractor without benefit and subsequently underwent CT first and then MRI.  MRI on 4/16/18 showed abnormalities at T7-8.  She underwent a work-up, including full labs to r/o infection.  Results came back not indication infection.  She has remained afebrile and denies N/V, chills or general malaise. She has since had PT with some benefit.  She states the pain is a dull pain that increases with reaching and twisting.  She denies lumbar radicular symptoms.  She denies weakness to BLE or changes to bowel or bladder.    Pain right now:  2    Past Medical History:   Diagnosis Date     Abdominal pain, other specified site 2013    RUQ     Gallstones 2013     GERD (gastroesophageal reflux disease)      Hypothyroidism        Past Medical History reviewed with patient during visit.    Past Surgical History:   Procedure Laterality Date     COLONOSCOPY       LAPAROSCOPIC CHOLECYSTECTOMY  4/24/2013    Procedure: LAPAROSCOPIC CHOLECYSTECTOMY;  Laparoscopic cholecystectomy.;  Surgeon: Bong Pyle MD;  Location: [facility] OR     Past Surgical History reviewed with patient during visit.    Current Outpatient Prescriptions   Medication     levothyroxine (SYNTHROID/LEVOTHROID) 100  MCG tablet     Nasal Moisturizer Combination (OCEAN ULTRA SALINE MIST NA)     ranitidine (ZANTAC) 150 MG tablet     No current facility-administered medications for this visit.        Allergies   Allergen Reactions     Darvon [Aspirin]        Social History     Social History     Marital status: Single     Spouse name: N/A     Number of children: N/A     Years of education: N/A     Social History Main Topics     Smoking status: Never Smoker     Smokeless tobacco: Never Used     Alcohol use 0.0 oz/week     0 Standard drinks or equivalent per week      Comment: occas     Drug use: No     Sexual activity: Not Currently     Partners: Male     Other Topics Concern     Parent/Sibling W/ Cabg, Mi Or Angioplasty Before 65f 55m? No     Social History Narrative    Lives alone in single level home. Does care for 93 yo mother primarily.       Family History   Problem Relation Age of Onset     C.A.D. Mother      CEREBROVASCULAR DISEASE Father          ROS: 10 point ROS neg other than the symptoms noted above in the HPI.    Vital Signs: There were no vitals taken for this visit.    Examination:  Constitutional:  Alert, well nourished, NAD.  HEENT: Normocephalic, atraumatic.   Pulm:  Without shortness of breath   CV:  No pitting edema of BLE.    Neurological:  Awake  Alert  Oriented x 3  Speech clear  Cranial nerves II - XII intact    Motor exam   Shoulder Abduction:  Right:  5/5   Left:  5/5  Biceps:                      Right:  5/5   Left:  5/5  Triceps:                     Right:  5/5   Left:  5/5  Wrist Extensors:       Right:  5/5   Left:  5/5  Wrist Flexors:           Right:  5/5   Left:  5/5  Intrinsics:                   Right:  5/5   Left:  5/5  Hip Flexor:                Right: 5/5  Left:  5/5  Hip Adductor:             Right:  5/5  Left:  5/5  Hip Abductor:             Right:  5/5  Left:  5/5  Gastroc Soleus:        Right:  5/5  Left:  5/5  Tib/Ant:                      Right:  5/5  Left:  5/5  EHL:                           Right:  5/5  Left:  5/5   Sensation normal to bilateral upper and lower extremities  Clonus negative  DTRs 1+ symmetric    Gait: Able to stand from a seated position. Normal non-antalgic, non-myelopathic gait.  Able to heel/toe walk without loss of balance  Cervical examination reveals good range of motion.  No tenderness to palpation of the cervical spine or paraspinous muscles bilaterally.    Thoracic examination reveals tenderness to mid thoracic spine, no paraspinous tenderness.    Lumbar examination reveals no tenderness of the spine or paraspinous muscles.  Hip height is symmetrical. Negative SI joint, sciatic notch or greater trochanteric tenderness to palpation bilaterally.  Straight leg raise is negative bilaterally.      Imaging:      IMPRESSION: Abnormality of the T7 and T8 vertebrae with relative sparing of the T7-T8 disc space as described above. Exuberant degenerative disease is most likely but infection cannot be entirely excluded. Please see description above. Interdepartmental consultation was obtained from a musculoskeletal radiologist who concurs.    Assessment/Plan:   Thoracic DDD    Keyonna De La Garza is a 67 year old female with a history of mid-back pain since awakening on 2/3/18.  She states she woke up that morning and couldn't stand due to pain.  She states the pain continued thru early April. She saw a chiropractor without benefit and subsequently underwent CT first and then MRI.  MRI on 4/16/18 showed abnormalities at T7-8.  She underwent a work-up, including full labs to r/o infection.  Results came back not indication infection.  She has remained afebrile and denies N/V, chills or general malaise. She has since had PT with some benefit.  She states the pain is a dull pain that increases with reaching and twisting.  She denies lumbar radicular symptoms.  She denies weakness to BLE or changes to bowel or bladder.  We reviewed MRI and CT findings.  We discussed continued PT.  She does  not feel the pain is severe and feels she is doing well.  If the pain returns or increases she will contact us      Patient Instructions   Please contact the clinic if pain persists at [phone].          Naila Dewey CNP  [facility]  [address] [address]    Tel [phone]  Pager [phone]      Again, thank you for allowing me to participate in the care of your patient.        Sincerely,        Naila Dewey, NP

## 2018-05-23 NOTE — NURSING NOTE
"Keyonna De La Garza is a 67 year old female who presents for:  Chief Complaint   Patient presents with     Neurologic Problem     referral from Dr. MARTIR Mccabe for bilateral thoracic pain        Vitals:    There were no vitals filed for this visit.    BMI:  Estimated body mass index is 32.11 kg/(m^2) as calculated from the following:    Height as of 5/3/18: 4' 9.5\" (1.461 m).    Weight as of 5/3/18: 151 lb (68.5 kg).    Pain Score:  Data Unavailable        Ayde Martines      "

## 2018-05-30 ENCOUNTER — THERAPY VISIT (OUTPATIENT)
Dept: PHYSICAL THERAPY | Facility: CLINIC | Age: 68
End: 2018-05-30
Payer: COMMERCIAL

## 2018-05-30 DIAGNOSIS — M54.6 PAIN IN THORACIC SPINE: ICD-10-CM

## 2018-05-30 PROCEDURE — 97140 MANUAL THERAPY 1/> REGIONS: CPT | Mod: GP | Performed by: PHYSICAL THERAPIST

## 2018-05-30 PROCEDURE — 97112 NEUROMUSCULAR REEDUCATION: CPT | Mod: GP | Performed by: PHYSICAL THERAPIST

## 2018-05-30 PROCEDURE — 97110 THERAPEUTIC EXERCISES: CPT | Mod: GP | Performed by: PHYSICAL THERAPIST

## 2018-05-31 PROBLEM — M54.6 PAIN IN THORACIC SPINE: Status: RESOLVED | Noted: 2017-08-14 | Resolved: 2018-05-31

## 2018-05-31 NOTE — PROGRESS NOTES
"Subjective:  HPI                    Objective:  System    Physical Exam    General     ROS    Assessment/Plan:    DISCHARGE REPORT    Progress reporting period is from 3/6/18 to 5/30/17 (7 visits).       SUBJECTIVE  Subjective changes noted by patient:  Patient saw spine speciallist.  Notes she is feeling \"pretty darn good\".  Not having sharp/stinging pain now.  Still has achiness and stiffness from shoulders to waist.  Prior blood tests came back okay.  Saw spine specailist and vertebral abnormality noted at T7-8.  No further treatment now, but can get cortisone injection if flares up.   Will see Dr Mccabe next week.     Current pain level is 1/10  .     Initial Pain level: 3/10.   Changes in function:  Yes (See Goal flowsheet attached for changes in current functional level)  Adverse reaction to treatment or activity: None    OBJECTIVE  Changes noted in objective findings:  Yes,   Objective: Thoracic AROM trace pain R rotation and extension (in R thoracic area)     ASSESSMENT/PLAN  Updated problem list and treatment plan: Diagnosis 1:  R thoracic pain    Pain -  home program  Decreased ROM/flexibility - home program  Decreased strength - home program  Decreased proprioception - home program  Decreased function - home program  Impaired posture - home program  STG/LTGs have been met or progress has been made towards goals:  Yes (See Goal flow sheet completed today.)  Assessment of Progress: The patient's condition is improving.  Self Management Plans:  Patient has been instructed in a home treatment program.  I have re-evaluated this patient and find that the nature, scope, duration and intensity of the therapy is appropriate for the medical condition of the patient.  Keyonna continues to require the following intervention to meet STG and LTG's:  PT intervention is no longer required to meet STG/LTG.    Recommendations:  This patient is ready to be discharged from therapy and continue their home treatment " program.    Please refer to the daily flowsheet for treatment today, total treatment time and time spent performing 1:1 timed codes.

## 2018-06-05 NOTE — PROGRESS NOTES
SUBJECTIVE:   Keyonna De La Garza is a 67 year old female who presents to clinic today for the following health issues:      Consult on Back pain    Pleasant 67-year-old female who was referred to neurosurgery for persistent back pain and an abnormal MRI finding.  Fortunately, her back pain seems to be persistently improving with physical therapy interventions.  She is now doing the physical therapy exercises at home with good symptom relief.  No surgical interventions were offered to her by neurosurgery.  However, if pain recurs severely, she was advised that she could be referred for a thoracic spine epidural steroid injection.  She is planning a cruise to the [place] in April 2019 and had many questions about seasickness and other travel related questions today.    Problem list and histories reviewed & adjusted, as indicated.  Additional history: as documented    Patient Active Problem List   Diagnosis     Advanced directives, counseling/discussion     Hypothyroidism     CARDIOVASCULAR SCREENING; LDL GOAL LESS THAN 130     GERD (gastroesophageal reflux disease)     Osteopenia     Cholecystitis     Lumbago     Past Surgical History:   Procedure Laterality Date     COLONOSCOPY       LAPAROSCOPIC CHOLECYSTECTOMY  4/24/2013    Procedure: LAPAROSCOPIC CHOLECYSTECTOMY;  Laparoscopic cholecystectomy.;  Surgeon: Bong Pyle MD;  Location:  OR       Social History   Substance Use Topics     Smoking status: Never Smoker     Smokeless tobacco: Never Used     Alcohol use 0.0 oz/week     0 Standard drinks or equivalent per week      Comment: occas     Family History   Problem Relation Age of Onset     C.A.D. Mother      CEREBROVASCULAR DISEASE Father          Current Outpatient Prescriptions   Medication Sig Dispense Refill     Biotin 1 MG CAPS Take 1 capsule by mouth daily       levothyroxine (SYNTHROID/LEVOTHROID) 100 MCG tablet Take 1 tablet (100 mcg) by mouth daily Must be seen by new primary provider in  "planned October visit for additional refills 90 tablet 3     Misc Natural Products (TURMERIC CURCUMIN) CAPS Take 1 capsule by mouth daily       ranitidine (ZANTAC) 150 MG tablet Take 1 tablet (150 mg) by mouth daily       Allergies   Allergen Reactions     Darvon [Aspirin]        Reviewed and updated as needed this visit by clinical staff       Reviewed and updated as needed this visit by Provider         ROS:  No fevers or chills, no unexplained weight loss, no new bowel or bladder symptoms    OBJECTIVE:     /72 (BP Location: Left arm, Patient Position: Chair, Cuff Size: Adult Regular)  Pulse 62  Temp 98.5  F (36.9  C) (Tympanic)  Ht 4' 9.5\" (1.461 m)  Wt 145 lb 6.4 oz (66 kg)  SpO2 98%  BMI 30.92 kg/m2  Body mass index is 30.92 kg/(m^2).  General: This is a well-appearing middle-aged female in no acute distress.  She appears quite comfortable.  She is often joking and laughing with the examiner.        ASSESSMENT/PLAN:         1. Chronic midline thoracic back pain  Fortunately, the patient's pain is improving with physical therapy interventions.  She is advised to stay persistent with doing physical therapy exercises to avoid a recurrence of pain.  At this point, I do not think that a spinal infection is likely given her improvement in symptoms lack of systemic symptoms and lack of elevation of inflammatory markers and negative blood cultures.  If she does have a recurrence of pain, and epidural steroid injection might be helpful and she will contact neurosurgery if that is the case.  Otherwise, we will see her in October when she is due for her preventive examination or sooner if needed.        Yosef Mccabe MD  [facility]    "

## 2018-06-06 ENCOUNTER — OFFICE VISIT (OUTPATIENT)
Dept: FAMILY MEDICINE | Facility: CLINIC | Age: 68
End: 2018-06-06
Payer: COMMERCIAL

## 2018-06-06 VITALS
BODY MASS INDEX: 30.52 KG/M2 | SYSTOLIC BLOOD PRESSURE: 140 MMHG | DIASTOLIC BLOOD PRESSURE: 72 MMHG | WEIGHT: 145.4 LBS | OXYGEN SATURATION: 98 % | TEMPERATURE: 98.5 F | HEIGHT: 58 IN | HEART RATE: 62 BPM

## 2018-06-06 DIAGNOSIS — G89.29 CHRONIC MIDLINE THORACIC BACK PAIN: Primary | ICD-10-CM

## 2018-06-06 DIAGNOSIS — M54.6 CHRONIC MIDLINE THORACIC BACK PAIN: Primary | ICD-10-CM

## 2018-06-06 PROCEDURE — 99212 OFFICE O/P EST SF 10 MIN: CPT | Performed by: INTERNAL MEDICINE

## 2018-06-06 RX ORDER — NICOTINE POLACRILEX 2 MG
1 GUM BUCCAL DAILY
COMMUNITY
Start: 2018-06-06 | End: 2019-10-14

## 2018-06-06 NOTE — NURSING NOTE
"Chief Complaint   Patient presents with     Consult     Back pain        Initial /72 (BP Location: Left arm, Patient Position: Chair, Cuff Size: Adult Regular)  Pulse 62  Temp 98.5  F (36.9  C) (Tympanic)  Ht 4' 9.5\" (1.461 m)  Wt 145 lb 6.4 oz (66 kg)  SpO2 98%  BMI 30.92 kg/m2 Estimated body mass index is 30.92 kg/(m^2) as calculated from the following:    Height as of this encounter: 4' 9.5\" (1.461 m).    Weight as of this encounter: 145 lb 6.4 oz (66 kg)..    BP completed using cuff size: regular  MEDICATIONS REVIEWED  SOCIAL AND FAMILY HX REVIEWED  Angy Richardson CMA  "

## 2018-06-06 NOTE — MR AVS SNAPSHOT
"              After Visit Summary   6/6/2018    Keyonna De La Garza    MRN: [id number]           Patient Information     Date Of Birth          1950        Visit Information        Provider Department      6/6/2018 4:00 PM Yosef Mccabe MD [facility] [place]        Today's Diagnoses     Chronic midline thoracic back pain    -  1       Follow-ups after your visit        Who to contact     If you have questions or need follow up information about today's clinic visit or your schedule please contact [facility] directly at [phone].  Normal or non-critical lab and imaging results will be communicated to you by MyChart, letter or phone within 4 business days after the clinic has received the results. If you do not hear from us within 7 days, please contact the clinic through MyChart or phone. If you have a critical or abnormal lab result, we will notify you by phone as soon as possible.  Submit refill requests through [employer] or call your pharmacy and they will forward the refill request to us. Please allow 3 business days for your refill to be completed.          Additional Information About Your Visit        Care EveryWhere ID     This is your Care EveryWhere ID. This could be used by other organizations to access your [place] medical records  [id number]        Your Vitals Were     Pulse Temperature Height Pulse Oximetry BMI (Body Mass Index)       62 98.5  F (36.9  C) (Tympanic) 4' 9.5\" (1.461 m) 98% 30.92 kg/m2        Blood Pressure from Last 3 Encounters:   06/06/18 140/72   05/23/18 147/77   05/03/18 138/73    Weight from Last 3 Encounters:   06/06/18 145 lb 6.4 oz (66 kg)   05/03/18 151 lb (68.5 kg)   04/12/18 147 lb (66.7 kg)              Today, you had the following     No orders found for display       Primary Care Provider Office Phone # Fax #    Yosef Mccabe [phone] [address]  [place] [address]        Equal Access to Services     [facility] " GAAR : Hadii aad luis angel jonathon Nogueira, warhonada luqadaha, qaybta kashawn charyannikajae, eleazar beebobregina lai. So [facility] [phone].    ATENCIÓN: Si habla español, tiene a spring disposición servicios gratuitos de asistencia lingüística. Llame al [phone].    We comply with applicable federal civil rights laws and [place] laws. We do not discriminate on the basis of race, color, national origin, age, disability, sex, sexual orientation, or gender identity.            Thank you!     Thank you for choosing [facility]  for your care. Our goal is always to provide you with excellent care. Hearing back from our patients is one way we can continue to improve our services. Please take a few minutes to complete the written survey that you may receive in the mail after your visit with us. Thank you!             Your Updated Medication List - Protect others around you: Learn how to safely use, store and throw away your medicines at www.disposemymeds.org.          This list is accurate as of 6/6/18  5:25 PM.  Always use your most recent med list.                   Brand Name Dispense Instructions for use Diagnosis    Biotin 1 MG Caps      Take 1 capsule by mouth daily        levothyroxine 100 MCG tablet    SYNTHROID/LEVOTHROID    90 tablet    Take 1 tablet (100 mcg) by mouth daily Must be seen by new primary provider in planned October visit for additional refills    Hypothyroidism, unspecified type       ranitidine 150 MG tablet    ZANTAC     Take 1 tablet (150 mg) by mouth daily        Turmeric Curcumin Caps      Take 1 capsule by mouth daily

## 2018-08-15 ENCOUNTER — HOSPITAL ENCOUNTER (OUTPATIENT)
Dept: MAMMOGRAPHY | Facility: CLINIC | Age: 68
Discharge: HOME | End: 2018-08-15
Attending: INTERNAL MEDICINE | Admitting: INTERNAL MEDICINE
Payer: COMMERCIAL

## 2018-08-15 DIAGNOSIS — Z12.31 VISIT FOR SCREENING MAMMOGRAM: ICD-10-CM

## 2018-08-15 PROCEDURE — 77067 SCR MAMMO BI INCL CAD: CPT

## 2018-10-08 ENCOUNTER — OFFICE VISIT (OUTPATIENT)
Dept: FAMILY MEDICINE | Facility: CLINIC | Age: 68
End: 2018-10-08
Payer: COMMERCIAL

## 2018-10-08 VITALS
WEIGHT: 142.9 LBS | TEMPERATURE: 98.5 F | OXYGEN SATURATION: 98 % | HEART RATE: 69 BPM | HEIGHT: 58 IN | BODY MASS INDEX: 30 KG/M2 | DIASTOLIC BLOOD PRESSURE: 71 MMHG | SYSTOLIC BLOOD PRESSURE: 138 MMHG

## 2018-10-08 DIAGNOSIS — J34.89 RHINORRHEA: ICD-10-CM

## 2018-10-08 DIAGNOSIS — K21.9 GASTROESOPHAGEAL REFLUX DISEASE, ESOPHAGITIS PRESENCE NOT SPECIFIED: ICD-10-CM

## 2018-10-08 DIAGNOSIS — Z23 NEED FOR VACCINATION: ICD-10-CM

## 2018-10-08 DIAGNOSIS — E03.9 HYPOTHYROIDISM, UNSPECIFIED TYPE: ICD-10-CM

## 2018-10-08 DIAGNOSIS — R19.7 DIARRHEA, UNSPECIFIED TYPE: ICD-10-CM

## 2018-10-08 DIAGNOSIS — Z00.00 ROUTINE GENERAL MEDICAL EXAMINATION AT A HEALTH CARE FACILITY: Primary | ICD-10-CM

## 2018-10-08 DIAGNOSIS — Z23 NEED FOR PROPHYLACTIC VACCINATION AGAINST STREPTOCOCCUS PNEUMONIAE (PNEUMOCOCCUS): ICD-10-CM

## 2018-10-08 LAB
ALBUMIN SERPL-MCNC: 4.2 G/DL (ref 3.4–5)
ALP SERPL-CCNC: 85 U/L (ref 40–150)
ALT SERPL W P-5'-P-CCNC: 34 U/L (ref 0–50)
ANION GAP SERPL CALCULATED.3IONS-SCNC: 9 MMOL/L (ref 3–14)
AST SERPL W P-5'-P-CCNC: 23 U/L (ref 0–45)
BILIRUB SERPL-MCNC: 0.5 MG/DL (ref 0.2–1.3)
BUN SERPL-MCNC: 7 MG/DL (ref 7–30)
CALCIUM SERPL-MCNC: 9.8 MG/DL (ref 8.5–10.1)
CHLORIDE SERPL-SCNC: 107 MMOL/L (ref 94–109)
CHOLEST SERPL-MCNC: 185 MG/DL
CO2 SERPL-SCNC: 24 MMOL/L (ref 20–32)
CREAT SERPL-MCNC: 0.61 MG/DL (ref 0.52–1.04)
ERYTHROCYTE [DISTWIDTH] IN BLOOD BY AUTOMATED COUNT: 13.2 % (ref 10–15)
GFR SERPL CREATININE-BSD FRML MDRD: >90 ML/MIN/1.7M2
GLUCOSE SERPL-MCNC: 87 MG/DL (ref 70–99)
HCT VFR BLD AUTO: 42.7 % (ref 35–47)
HDLC SERPL-MCNC: 78 MG/DL
HGB BLD-MCNC: 13.5 G/DL (ref 11.7–15.7)
LDLC SERPL CALC-MCNC: 85 MG/DL
MCH RBC QN AUTO: 29.8 PG (ref 26.5–33)
MCHC RBC AUTO-ENTMCNC: 31.6 G/DL (ref 31.5–36.5)
MCV RBC AUTO: 94 FL (ref 78–100)
NONHDLC SERPL-MCNC: 107 MG/DL
PLATELET # BLD AUTO: 380 10E9/L (ref 150–450)
POTASSIUM SERPL-SCNC: 4.1 MMOL/L (ref 3.4–5.3)
PROT SERPL-MCNC: 8.4 G/DL (ref 6.8–8.8)
RBC # BLD AUTO: 4.53 10E12/L (ref 3.8–5.2)
SODIUM SERPL-SCNC: 140 MMOL/L (ref 133–144)
TRIGL SERPL-MCNC: 110 MG/DL
TSH SERPL DL<=0.005 MIU/L-ACNC: 0.84 MU/L (ref 0.4–4)
WBC # BLD AUTO: 9.4 10E9/L (ref 4–11)

## 2018-10-08 PROCEDURE — 80061 LIPID PANEL: CPT | Performed by: INTERNAL MEDICINE

## 2018-10-08 PROCEDURE — 90471 IMMUNIZATION ADMIN: CPT | Performed by: INTERNAL MEDICINE

## 2018-10-08 PROCEDURE — 84443 ASSAY THYROID STIM HORMONE: CPT | Performed by: INTERNAL MEDICINE

## 2018-10-08 PROCEDURE — 80053 COMPREHEN METABOLIC PANEL: CPT | Performed by: INTERNAL MEDICINE

## 2018-10-08 PROCEDURE — 99397 PER PM REEVAL EST PAT 65+ YR: CPT | Mod: 25 | Performed by: INTERNAL MEDICINE

## 2018-10-08 PROCEDURE — 85027 COMPLETE CBC AUTOMATED: CPT | Performed by: INTERNAL MEDICINE

## 2018-10-08 PROCEDURE — 36415 COLL VENOUS BLD VENIPUNCTURE: CPT | Performed by: INTERNAL MEDICINE

## 2018-10-08 PROCEDURE — 99213 OFFICE O/P EST LOW 20 MIN: CPT | Mod: 25 | Performed by: INTERNAL MEDICINE

## 2018-10-08 PROCEDURE — 90732 PPSV23 VACC 2 YRS+ SUBQ/IM: CPT | Performed by: INTERNAL MEDICINE

## 2018-10-08 RX ORDER — ACETAMINOPHEN 500 MG
1000 TABLET ORAL EVERY 8 HOURS PRN
COMMUNITY

## 2018-10-08 RX ORDER — LEVOTHYROXINE SODIUM 100 UG/1
100 TABLET ORAL DAILY
Qty: 90 TABLET | Refills: 3 | Status: SHIPPED | OUTPATIENT
Start: 2018-10-08 | End: 2019-10-04

## 2018-10-08 NOTE — PROGRESS NOTES
SUBJECTIVE:   Keyonna De La Garza is a 68 year old female who presents for Preventive Visit.    Also she had episode of severe non bloody diarrhea without vomiting or abdominal pain last night   No fever or chills, symptoms improved with pepto taken last night     Describes 3 months of nasal congestion, rhinorrhea, facial pressure  She wonders if she has a sinus infection?  No fevers  She feels that her throat is not sore but described as stiff   She does not tolerate nasal sprays including Flonase due to causing head pain  She has used antihistamines with limited symptom relief     Are you in the first 12 months of your Medicare Part B coverage?  No    Healthy Habits:    Do you get at least three servings of calcium containing foods daily (dairy, green leafy vegetables, etc.)? yes    Amount of exercise or daily activities, outside of work: 3 day(s) per week    Problems taking medications regularly No    Medication side effects: No    Have you had an eye exam in the past two years? yes    Do you see a dentist twice per year? yes    Do you have sleep apnea, excessive snoring or daytime drowsiness?no      Ability to successfully perform activities of daily living: Yes, no assistance needed    Home safety:  throw rugs in the hallway and lack of grab bars in the bathroom     Hearing impairment: Ear wax    Fall risk:  Any fall with injury in the past year?: No      COGNITIVE SCREEN  1) Repeat 3 items (Leader, Season, Table)    2) Clock draw: NORMAL  3) 3 item recall: Recalls 3 objects  Results: 3 items recalled: COGNITIVE IMPAIRMENT LESS LIKELY    Mini-CogTM Copyright GUIDO Ozuna. Licensed by the author for use in [facility]; reprinted with permission (micki@.[facility]). All rights reserved.          Reviewed and updated as needed this visit by clinical staff  Tobacco  Allergies  Meds  Med Hx  Fam Hx  Soc Hx        Reviewed and updated as needed this visit by Provider  Tobacco  Med Hx  Fam Hx  Soc Hx        Social History   Substance Use Topics     Smoking status: Never Smoker     Smokeless tobacco: Never Used     Alcohol use 0.0 oz/week     0 Standard drinks or equivalent per week      Comment: 2 drinks per week       If you drink alcohol do you typically have >3 drinks per day or >7 drinks per week? No                        Today's PHQ-2 Score:   PHQ-2 ( 1999 [employer]) 10/8/2018 10/2/2017   Q1: Little interest or pleasure in doing things 0 0   Q2: Feeling down, depressed or hopeless - 0   PHQ-2 Score - 0       Do you feel safe in your environment - No    Do you have a Health Care Directive?: No: Advance care planning reviewed with patient; information given to patient to review.    Current providers sharing in care for this patient include:   Patient Care Team:  Yosef Mccabe MD as PCP - General (Internal Medicine)    The following health maintenance items are reviewed in Epic and correct as of today:  Health Maintenance   Topic Date Due     ADVANCE DIRECTIVE PLANNING Q5 YRS  02/08/2017     PAP Q3 YR  12/01/2017     FALL RISK ASSESSMENT  10/02/2018     PNEUMOCOCCAL (2 of 2 - PPSV23) 10/02/2018     PHQ-2 Q1 YR  10/02/2018     TSH Q1 YEAR  12/11/2018     COLON CANCER SCREEN (SYSTEM ASSIGNED)  06/11/2019     MAMMO SCREEN Q2 YR (SYSTEM ASSIGNED)  08/15/2020     TETANUS IMMUNIZATION (SYSTEM ASSIGNED)  02/09/2022     LIPID SCREEN Q5 YR FEMALE (SYSTEM ASSIGNED)  10/02/2022     DEXA SCAN SCREENING (SYSTEM ASSIGNED)  Completed     INFLUENZA VACCINE  Completed     HEPATITIS C SCREENING  Completed     Patient Active Problem List   Diagnosis     Advanced directives, counseling/discussion     Hypothyroidism     CARDIOVASCULAR SCREENING; LDL GOAL LESS THAN 130     GERD (gastroesophageal reflux disease)     Osteopenia     Cholecystitis     Lumbago     Past Surgical History:   Procedure Laterality Date     COLONOSCOPY       LAPAROSCOPIC CHOLECYSTECTOMY  4/24/2013    Procedure: LAPAROSCOPIC CHOLECYSTECTOMY;  Laparoscopic  "cholecystectomy.;  Surgeon: Bong Pyle MD;  Location:  OR       Social History   Substance Use Topics     Smoking status: Never Smoker     Smokeless tobacco: Never Used     Alcohol use 0.0 oz/week     0 Standard drinks or equivalent per week      Comment: 2 drinks per week     Family History   Problem Relation Age of Onset     C.A.D. Mother      Cerebrovascular Disease Father          Current Outpatient Prescriptions   Medication Sig Dispense Refill     acetaminophen (TYLENOL) 500 MG tablet Take 1,000 mg by mouth every 8 hours as needed for mild pain       Biotin 1 MG CAPS Take 1 capsule by mouth daily       levothyroxine (SYNTHROID/LEVOTHROID) 100 MCG tablet Take 1 tablet (100 mcg) by mouth daily Must be seen by new primary provider in planned October visit for additional refills 90 tablet 3     Misc Natural Products (TURMERIC CURCUMIN) CAPS Take 1 capsule by mouth daily       ranitidine (ZANTAC) 150 MG tablet Take 1 tablet (150 mg) by mouth daily       Allergies   Allergen Reactions     Darvon [Aspirin]            ROS:  Constitutional, HEENT, cardiovascular, pulmonary, gi and gu systems are negative, except as otherwise noted.    OBJECTIVE:   /71 (BP Location: Right arm, Cuff Size: Adult Large)  Pulse 69  Temp 98.5  F (36.9  C) (Oral)  Ht 4' 9.5\" (1.461 m)  Wt 142 lb 14.4 oz (64.8 kg)  SpO2 98%  BMI 30.39 kg/m2 Estimated body mass index is 30.39 kg/(m^2) as calculated from the following:    Height as of this encounter: 4' 9.5\" (1.461 m).    Weight as of this encounter: 142 lb 14.4 oz (64.8 kg).  EXAM:   GENERAL APPEARANCE: healthy, alert and no distress  EYES: Eyes grossly normal to inspection, PERRL and conjunctivae and sclerae normal  HENT: ear canals and TM's normal, nose and mouth without ulcers or lesions, oropharynx clear and oral mucous membranes moist  NECK: no adenopathy, no asymmetry, masses, or scars and thyroid normal to palpation  RESP: lungs clear to auscultation - no rales, " rhonchi or wheezes  BREAST: normal without masses, tenderness or nipple discharge and no palpable axillary masses or adenopathy  CV: regular rate and rhythm, normal S1 S2, no S3 or S4, no murmur, click or rub, no peripheral edema and peripheral pulses strong  ABDOMEN: soft, nontender, no hepatosplenomegaly, no masses and bowel sounds normal  MS: no musculoskeletal defects are noted and gait is age appropriate without ataxia  SKIN: no suspicious lesions or rashes  NEURO: Normal strength and tone, sensory exam grossly normal, mentation intact and speech normal  PSYCH: mentation appears normal and affect normal/bright    Diagnostic Test Results:  Lab pending     ASSESSMENT / PLAN:   1. Routine general medical examination at a health care facility    - Lipid panel reflex to direct LDL Fasting  - Comprehensive metabolic panel  - CBC with platelets    2. Diarrhea, unspecified type  Probably viral enteritis since she has not had symptoms for very long and no red flag symptoms  I recommended monitoring symptoms  If diarrhea persists beyond 10 -14 days, then consider stool culture and C. Diff toxin; colonoscopy early if needed if those tests negative; discussed with patient  Discussed ongoing use of pepto or immodium for symptoms if needed   - OFFICE/OUTPT VISIT,EST,LEVL III    3. Rhinorrhea  Given duration of symptoms, I think she should have ENT consult for sinus culture to guide therapy if therapy is indicated   - OTOLARYNGOLOGY REFERRAL  - OFFICE/OUTPT VISIT,EST,LEVL III    4. Hypothyroidism, unspecified type    - TSH    5. Gastroesophageal reflux disease, esophagitis presence not specified  Symptoms well controlled    6. Need for prophylactic vaccination against Streptococcus pneumoniae (pneumococcus)  P23 today      End of Life Planning:  Patient currently has an advanced directive: No.  I have verified the patient's ablity to prepare an advanced directive/make health care decisions.  Literature was provided to assist  "patient in preparing an advanced directive.    COUNSELING:  Reviewed preventive health counseling, as reflected in patient instructions  Special attention given to:       Regular exercise       Healthy diet/nutrition       Immunizations    She had a flu shot this season; P23 was recommended; shingrix recommended              Consider lung cancer screening for ages 55-80 years and 30 pack-year smoking history ; never smoker        Colon cancer screening; reminded to schedule screening colonoscopy in 6/2019       Hepatitis C screening; done    She has a health maintenance reminder for a Pap every 3 years, but she does not recall an abnormal Pap in her life.  She had a normal Pap smear available for my review in 2012.  If she has not had an abnormal Pap in her lifetime, then she does not need additional Pap smears after the age of 65.  We discussed this and she is comfortable with that plan.      BP Readings from Last 1 Encounters:   10/08/18 138/71     Estimated body mass index is 30.39 kg/(m^2) as calculated from the following:    Height as of this encounter: 4' 9.5\" (1.461 m).    Weight as of this encounter: 142 lb 14.4 oz (64.8 kg).    BP Screening:   Last 3 BP Readings:    BP Readings from Last 3 Encounters:   10/08/18 138/71   06/06/18 140/72   05/23/18 147/77       The following was recommended to the patient:  Re-screen BP within a year and recommended lifestyle modifications  Weight management plan: Discussed healthy diet and exercise guidelines and patient will follow up in 12 months in clinic to re-evaluate.     reports that she has never smoked. She has never used smokeless tobacco.      Appropriate preventive services were discussed with this patient, including applicable screening as appropriate for cardiovascular disease, diabetes, osteopenia/osteoporosis, and glaucoma.  As appropriate for age/gender, discussed screening for colorectal cancer, prostate cancer, breast cancer, and cervical cancer. " Checklist reviewing preventive services available has been given to the patient.    Reviewed patients plan of care and provided an AVS. The Basic Care Plan (routine screening as documented in Health Maintenance) for Keyonna meets the Care Plan requirement. This Care Plan has been established and reviewed with the Patient.    Counseling Resources:  ATP IV Guidelines  Pooled Cohorts Equation Calculator  Breast Cancer Risk Calculator  FRAX Risk Assessment  ICSI Preventive Guidelines  Dietary Guidelines for Americans, 2010  USDA's MyPlate  ASA Prophylaxis  Lung CA Screening    Yosef Mccabe MD  [facility]

## 2018-10-08 NOTE — PATIENT INSTRUCTIONS
"You should get the new shingles vaccine \"SHINGRIX\" (not Zostavax) at your pharmacy.            Preventive Health Recommendations    Female Ages 65 +    Yearly exam:     See your health care provider every year in order to  o Review health changes.   o Discuss preventive care.    o Review your medicines if your doctor has prescribed any.      You no longer need a yearly Pap test unless you've had an abnormal Pap test in the past 10 years. If you have vaginal symptoms, such as bleeding or discharge, be sure to talk with your provider about a Pap test.      Every 1 to 2 years, have a mammogram.  If you are over 69, talk with your health care provider about whether or not you want to continue having screening mammograms.      Every 10 years, have a colonoscopy. Or, have a yearly FIT test (stool test). These exams will check for colon cancer.       Have a cholesterol test every 5 years, or more often if your doctor advises it.       Have a diabetes test (fasting glucose) every three years. If you are at risk for diabetes, you should have this test more often.       At age 65, have a bone density scan (DEXA) to check for osteoporosis (brittle bone disease).    Shots:    Get a flu shot each year.    Get a tetanus shot every 10 years.    Talk to your doctor about your pneumonia vaccines. There are now two you should receive - Pneumovax (PPSV 23) and Prevnar (PCV 13).    Talk to your pharmacist about the shingles vaccine.    Talk to your doctor about the hepatitis B vaccine.    Nutrition:     Eat at least 5 servings of fruits and vegetables each day.      Eat whole-grain bread, whole-wheat pasta and brown rice instead of white grains and rice.      Get adequate Calcium and Vitamin D.     Lifestyle    Exercise at least 150 minutes a week (30 minutes a day, 5 days a week). This will help you control your weight and prevent disease.      Limit alcohol to one drink per day.      No smoking.       Wear sunscreen to prevent skin " cancer.       See your dentist twice a year for an exam and cleaning.      See your eye doctor every 1 to 2 years to screen for conditions such as glaucoma, macular degeneration and cataracts.    Preventive Health Recommendations    Female Ages 65 +    Yearly exam:     See your health care provider every year in order to  o Review health changes.   o Discuss preventive care.    o Review your medicines if your doctor has prescribed any.      You no longer need a yearly Pap test unless you've had an abnormal Pap test in the past 10 years. If you have vaginal symptoms, such as bleeding or discharge, be sure to talk with your provider about a Pap test.      Every 1 to 2 years, have a mammogram.  If you are over 69, talk with your health care provider about whether or not you want to continue having screening mammograms.      Every 10 years, have a colonoscopy. Or, have a yearly FIT test (stool test). These exams will check for colon cancer.       Have a cholesterol test every 5 years, or more often if your doctor advises it.       Have a diabetes test (fasting glucose) every three years. If you are at risk for diabetes, you should have this test more often.       At age 65, have a bone density scan (DEXA) to check for osteoporosis (brittle bone disease).    Shots:    Get a flu shot each year.    Get a tetanus shot every 10 years.    Talk to your doctor about your pneumonia vaccines. There are now two you should receive - Pneumovax (PPSV 23) and Prevnar (PCV 13).    Talk to your pharmacist about the shingles vaccine.    Talk to your doctor about the hepatitis B vaccine.    Nutrition:     Eat at least 5 servings of fruits and vegetables each day.      Eat whole-grain bread, whole-wheat pasta and brown rice instead of white grains and rice.      Get adequate Calcium and Vitamin D.     Lifestyle    Exercise at least 150 minutes a week (30 minutes a day, 5 days a week). This will help you control your weight and prevent  disease.      Limit alcohol to one drink per day.      No smoking.       Wear sunscreen to prevent skin cancer.       See your dentist twice a year for an exam and cleaning.      See your eye doctor every 1 to 2 years to screen for conditions such as glaucoma, macular degeneration and cataracts.

## 2018-10-08 NOTE — PROGRESS NOTES
Screening Questionnaire for Adult Immunization    Are you sick today?   No   Do you have allergies to medications, food, a vaccine component or latex?   No   Have you ever had a serious reaction after receiving a vaccination?   No   Do you have a long-term health problem with heart disease, lung disease, asthma, kidney disease, metabolic disease (e.g. diabetes), anemia, or other blood disorder?   No   Do you have cancer, leukemia, HIV/AIDS, or any other immune system problem?   No   In the past 3 months, have you taken medications that affect  your immune system, such as prednisone, other steroids, or anticancer drugs; drugs for the treatment of rheumatoid arthritis, Crohn s disease, or psoriasis; or have you had radiation treatments?   No   Have you had a seizure, or a brain or other nervous system problem?   No   During the past year, have you received a transfusion of blood or blood     products, or been given immune (gamma) globulin or antiviral drug?   No   For women: Are you pregnant or is there a chance you could become        pregnant during the next month?   No   Have you received any vaccinations in the past 4 weeks?   No     Immunization questionnaire answers were all negative.        Per orders of Dr. Mccabe, injection of PPV23 given by Mary Rodriguez. Patient instructed to remain in clinic for 15 minutes afterwards, and to report any adverse reaction to me immediately.       Screening performed by Mary Rodriguez on 10/8/2018 at 9:01 AM.

## 2018-10-08 NOTE — LETTER
[place] [address] [facility]  [address]  [place]  [address]  Tel: [phone]    October 9, 2018    Keyonna De La Garza  [address] [facility] [address]        Dear Ms. De La Garza,    The following letter pertains to your most recent diagnostic tests:    -Your cholesterol panel looks healthy.     -TSH (thyroid stimulating hormone) level is normal which indicates normal circulating thyroid hormone levels.      -Liver and gallbladder tests are normal for you. (ALT,AST, Alk phos, bilirubin), kidney function is normal for you (Creatinine, GFR), Sodium is normal, Potassium is normal for you, Calcium is normal for you, Glucose (blood sugar) is normal for you.      -Your complete blood counts including your hemoglobin returned normal for you.           Bottom line:  Your lab results look healthy and at goal.         Follow up:  Schedule an appointment for a physical examination with fasting blood tests in one year's time, or return sooner if new questions, symptoms or problems arise.       Sincerely,    Yosef Mccabe MD/YAMEL          Enclosure: Lab Results  Results for orders placed or performed in visit on 10/08/18   Lipid panel reflex to direct LDL Fasting   Result Value Ref Range    Cholesterol 185 <200 mg/dL    Triglycerides 110 <150 mg/dL    HDL Cholesterol 78 >49 mg/dL    LDL Cholesterol Calculated 85 <100 mg/dL    Non HDL Cholesterol 107 <130 mg/dL   TSH   Result Value Ref Range    TSH 0.84 0.40 - 4.00 mU/L   Comprehensive metabolic panel   Result Value Ref Range    Sodium 140 133 - 144 mmol/L    Potassium 4.1 3.4 - 5.3 mmol/L    Chloride 107 94 - 109 mmol/L    Carbon Dioxide 24 20 - 32 mmol/L    Anion Gap 9 3 - 14 mmol/L    Glucose 87 70 - 99 mg/dL    Urea Nitrogen 7 7 - 30 mg/dL    Creatinine 0.61 0.52 - 1.04 mg/dL    GFR Estimate >90 >60 mL/min/1.7m2    GFR Estimate If Black >90 >60 mL/min/1.7m2    Calcium 9.8 8.5 - 10.1 mg/dL    Bilirubin Total 0.5 0.2 - 1.3 mg/dL    Albumin 4.2 3.4 - 5.0  g/dL    Protein Total 8.4 6.8 - 8.8 g/dL    Alkaline Phosphatase 85 40 - 150 U/L    ALT 34 0 - 50 U/L    AST 23 0 - 45 U/L   CBC with platelets   Result Value Ref Range    WBC 9.4 4.0 - 11.0 10e9/L    RBC Count 4.53 3.8 - 5.2 10e12/L    Hemoglobin 13.5 11.7 - 15.7 g/dL    Hematocrit 42.7 35.0 - 47.0 %    MCV 94 78 - 100 fl    MCH 29.8 26.5 - 33.0 pg    MCHC 31.6 31.5 - 36.5 g/dL    RDW 13.2 10.0 - 15.0 %    Platelet Count 380 150 - 450 10e9/L

## 2018-10-08 NOTE — MR AVS SNAPSHOT
"              After Visit Summary   10/8/2018    Keyonna De La Garza    MRN: [id number]           Patient Information     Date Of Birth          1950        Visit Information        Provider Department      10/8/2018 8:00 AM Yosef Mccabe MD [facility]        Today's Diagnoses     Routine general medical examination at a health care facility    -  1    Diarrhea, unspecified type        Rhinorrhea        Hypothyroidism, unspecified type        Gastroesophageal reflux disease, esophagitis presence not specified        Need for prophylactic vaccination against Streptococcus pneumoniae (pneumococcus)        Need for vaccination          Care Instructions    You should get the new shingles vaccine \"SHINGRIX\" (not Zostavax) at your pharmacy.            Preventive Health Recommendations    Female Ages 65 +    Yearly exam:     See your health care provider every year in order to  o Review health changes.   o Discuss preventive care.    o Review your medicines if your doctor has prescribed any.      You no longer need a yearly Pap test unless you've had an abnormal Pap test in the past 10 years. If you have vaginal symptoms, such as bleeding or discharge, be sure to talk with your provider about a Pap test.      Every 1 to 2 years, have a mammogram.  If you are over 69, talk with your health care provider about whether or not you want to continue having screening mammograms.      Every 10 years, have a colonoscopy. Or, have a yearly FIT test (stool test). These exams will check for colon cancer.       Have a cholesterol test every 5 years, or more often if your doctor advises it.       Have a diabetes test (fasting glucose) every three years. If you are at risk for diabetes, you should have this test more often.       At age 65, have a bone density scan (DEXA) to check for osteoporosis (brittle bone disease).    Shots:    Get a flu shot each year.    Get a tetanus shot every 10 years.    Talk to your doctor " about your pneumonia vaccines. There are now two you should receive - Pneumovax (PPSV 23) and Prevnar (PCV 13).    Talk to your pharmacist about the shingles vaccine.    Talk to your doctor about the hepatitis B vaccine.    Nutrition:     Eat at least 5 servings of fruits and vegetables each day.      Eat whole-grain bread, whole-wheat pasta and brown rice instead of white grains and rice.      Get adequate Calcium and Vitamin D.     Lifestyle    Exercise at least 150 minutes a week (30 minutes a day, 5 days a week). This will help you control your weight and prevent disease.      Limit alcohol to one drink per day.      No smoking.       Wear sunscreen to prevent skin cancer.       See your dentist twice a year for an exam and cleaning.      See your eye doctor every 1 to 2 years to screen for conditions such as glaucoma, macular degeneration and cataracts.    Preventive Health Recommendations    Female Ages 65 +    Yearly exam:     See your health care provider every year in order to  o Review health changes.   o Discuss preventive care.    o Review your medicines if your doctor has prescribed any.      You no longer need a yearly Pap test unless you've had an abnormal Pap test in the past 10 years. If you have vaginal symptoms, such as bleeding or discharge, be sure to talk with your provider about a Pap test.      Every 1 to 2 years, have a mammogram.  If you are over 69, talk with your health care provider about whether or not you want to continue having screening mammograms.      Every 10 years, have a colonoscopy. Or, have a yearly FIT test (stool test). These exams will check for colon cancer.       Have a cholesterol test every 5 years, or more often if your doctor advises it.       Have a diabetes test (fasting glucose) every three years. If you are at risk for diabetes, you should have this test more often.       At age 65, have a bone density scan (DEXA) to check for osteoporosis (brittle bone  disease).    Shots:    Get a flu shot each year.    Get a tetanus shot every 10 years.    Talk to your doctor about your pneumonia vaccines. There are now two you should receive - Pneumovax (PPSV 23) and Prevnar (PCV 13).    Talk to your pharmacist about the shingles vaccine.    Talk to your doctor about the hepatitis B vaccine.    Nutrition:     Eat at least 5 servings of fruits and vegetables each day.      Eat whole-grain bread, whole-wheat pasta and brown rice instead of white grains and rice.      Get adequate Calcium and Vitamin D.     Lifestyle    Exercise at least 150 minutes a week (30 minutes a day, 5 days a week). This will help you control your weight and prevent disease.      Limit alcohol to one drink per day.      No smoking.       Wear sunscreen to prevent skin cancer.       See your dentist twice a year for an exam and cleaning.      See your eye doctor every 1 to 2 years to screen for conditions such as glaucoma, macular degeneration and cataracts.          Follow-ups after your visit        Additional Services     OTOLARYNGOLOGY REFERRAL       Your provider has referred you to: [facility]: [facility] [place] [phone]   [email or web address]    Dr. Geovany Ch (Ear, Nose, Throat doctor)    Please be aware that coverage of these services is subject to the terms and limitations of your health insurance plan.  Call member services at your health plan with any benefit or coverage questions.      Please bring the following with you to your appointment:    (1) Any X-Rays, CTs or MRIs which have been performed.  Contact the facility where they were done to arrange for  prior to your scheduled appointment.   (2) List of current medications  (3) This referral request   (4) Any documents/labs given to you for this referral                  Follow-up notes from your care team     Return in about 1 year (around 10/8/2019) for Preventive Visit.      Who to contact     If  "you have questions or need follow up information about today's clinic visit or your schedule please contact [facility] directly at [phone].  Normal or non-critical lab and imaging results will be communicated to you by MyChart, letter or phone within 4 business days after the clinic has received the results. If you do not hear from us within 7 days, please contact the clinic through [facility] or phone. If you have a critical or abnormal lab result, we will notify you by phone as soon as possible.  Submit refill requests through [facility] or call your pharmacy and they will forward the refill request to us. Please allow 3 business days for your refill to be completed.          Additional Information About Your Visit        [facility] Information     [facility] lets you send messages to your doctor, view your test results, renew your prescriptions, schedule appointments and more. To sign up, go to [email or web address] . Click on \"Log in\" on the left side of the screen, which will take you to the Welcome page. Then click on \"Sign up Now\" on the right side of the page.     You will be asked to enter the access code listed below, as well as some personal information. Please follow the directions to create your username and password.     Your access code is: [email or web address]  Expires: 2019  9:01 AM     Your access code will  in 90 days. If you need help or a new code, please call your [place] clinic or [phone].        Care EveryWhere ID     This is your Care EveryWhere ID. This could be used by other organizations to access your [place] medical records  [id number]        Your Vitals Were     Pulse Temperature Height Pulse Oximetry BMI (Body Mass Index)       69 98.5  F (36.9  C) (Oral) 4' 9.5\" (1.461 m) 98% 30.39 kg/m2        Blood Pressure from Last 3 Encounters:   10/08/18 138/71   18 140/72   18 147/77    Weight from Last 3 Encounters:   10/08/18 142 lb 14.4 oz (64.8 kg)   18 " 145 lb 6.4 oz (66 kg)   05/03/18 151 lb (68.5 kg)              We Performed the Following     ADMIN: Vaccine, Initial (53322)     CBC with platelets     Comprehensive metabolic panel     Lipid panel reflex to direct LDL Fasting     OFFICE/OUTPT VISIT,CURLY GLASER III     OTOLARYNGOLOGY REFERRAL     Pneumococcal vaccine 23 valent PPSV23  (Pneumovax) [94027]     TSH          Where to get your medicines      These medications were sent to [id number] - [place], [place] - [address] [facility]  [address] [facility] [address]     Phone:  [phone]     levothyroxine 100 MCG tablet          Primary Care Provider Office Phone # Fax #    Yosef Mccabe [phone] [address] [address]        Equal Access to Services     [facility] : Hadii david ku hadasho Soelena, waaxda luqadaha, qaybta kaalmada adebernadineyada, eleazar pisano . So [facility] [phone].    ATENCIÓN: Si habla español, tiene a spring disposición servicios gratuitos de asistencia lingüística. [facility] [phone].    We comply with applicable federal civil rights laws and [place] laws. We do not discriminate on the basis of race, color, national origin, age, disability, sex, sexual orientation, or gender identity.            Thank you!     Thank you for choosing [facility]  for your care. Our goal is always to provide you with excellent care. Hearing back from our patients is one way we can continue to improve our services. Please take a few minutes to complete the written survey that you may receive in the mail after your visit with us. Thank you!             Your Updated Medication List - Protect others around you: Learn how to safely use, store and throw away your medicines at www.disposemymeds.org.          This list is accurate as of 10/8/18  9:01 AM.  Always use your most recent med list.                   Brand Name Dispense Instructions for use Diagnosis     Biotin 1 MG Caps      Take 1 capsule by mouth daily        levothyroxine 100 MCG tablet    SYNTHROID/LEVOTHROID    90 tablet    Take 1 tablet (100 mcg) by mouth daily Must be seen by new primary provider in planned October visit for additional refills    Hypothyroidism, unspecified type       ranitidine 150 MG tablet    ZANTAC     Take 1 tablet (150 mg) by mouth daily        Turmeric Curcumin Caps      Take 1 capsule by mouth daily        TYLENOL 500 MG tablet   Generic drug:  acetaminophen      Take 1,000 mg by mouth every 8 hours as needed for mild pain

## 2018-10-09 NOTE — PROGRESS NOTES
The following letter pertains to your most recent diagnostic tests:    -Your cholesterol panel looks healthy.     -TSH (thyroid stimulating hormone) level is normal which indicates normal circulating thyroid hormone levels.      -Liver and gallbladder tests are normal for you. (ALT,AST, Alk phos, bilirubin), kidney function is normal for you (Creatinine, GFR), Sodium is normal, Potassium is normal for you, Calcium is normal for you, Glucose (blood sugar) is normal for you.      -Your complete blood counts including your hemoglobin returned normal for you.           Bottom line:  Your lab results look healthy and at goal.         Follow up:  Schedule an appointment for a physical examination with fasting blood tests in one year's time, or return sooner if new questions, symptoms or problems arise.       Sincerely,    Dr. Mccabe

## 2018-10-22 ENCOUNTER — CHARTING TRANS (OUTPATIENT)
Dept: OTHER | Age: 68
End: 2018-10-22

## 2018-11-19 ENCOUNTER — OFFICE VISIT (OUTPATIENT)
Dept: FAMILY MEDICINE | Facility: CLINIC | Age: 68
End: 2018-11-19
Payer: COMMERCIAL

## 2018-11-19 VITALS
BODY MASS INDEX: 29.6 KG/M2 | DIASTOLIC BLOOD PRESSURE: 72 MMHG | WEIGHT: 141 LBS | TEMPERATURE: 98.6 F | HEIGHT: 58 IN | HEART RATE: 72 BPM | SYSTOLIC BLOOD PRESSURE: 124 MMHG | OXYGEN SATURATION: 98 %

## 2018-11-19 DIAGNOSIS — Z23 NEED FOR VACCINATION: ICD-10-CM

## 2018-11-19 DIAGNOSIS — H81.10 BENIGN PAROXYSMAL POSITIONAL VERTIGO, UNSPECIFIED LATERALITY: Primary | ICD-10-CM

## 2018-11-19 PROCEDURE — 99213 OFFICE O/P EST LOW 20 MIN: CPT | Mod: 25 | Performed by: INTERNAL MEDICINE

## 2018-11-19 PROCEDURE — 90471 IMMUNIZATION ADMIN: CPT | Performed by: INTERNAL MEDICINE

## 2018-11-19 NOTE — NURSING NOTE
Screening Questionnaire for Adult Immunization    Are you sick today?   No   Do you have allergies to medications, food, a vaccine component or latex?   No   Have you ever had a serious reaction after receiving a vaccination?   No   Do you have a long-term health problem with heart disease, lung disease, asthma, kidney disease, metabolic disease (e.g. diabetes), anemia, or other blood disorder?   No   Do you have cancer, leukemia, HIV/AIDS, or any other immune system problem?   No   In the past 3 months, have you taken medications that affect  your immune system, such as prednisone, other steroids, or anticancer drugs; drugs for the treatment of rheumatoid arthritis, Crohn s disease, or psoriasis; or have you had radiation treatments?   No   Have you had a seizure, or a brain or other nervous system problem?   No   During the past year, have you received a transfusion of blood or blood     products, or been given immune (gamma) globulin or antiviral drug?   No   For women: Are you pregnant or is there a chance you could become        pregnant during the next month?   No   Have you received any vaccinations in the past 4 weeks?   No     Immunization questionnaire answers were all negative.        Per orders of Dr. Mccabe, injection of Shingrix given by Mary Rodriguez. Patient instructed to remain in clinic for 15 minutes afterwards, and to report any adverse reaction to me immediately.       Screening performed by Mary Rodriguez on 11/19/2018 at 10:47 AM.    ABN reviewed and signed. Sent to HIM for scanning to chart.

## 2018-11-19 NOTE — MR AVS SNAPSHOT
After Visit Summary   11/19/2018    Keyonna De La Garza    MRN: [id number]           Patient Information     Date Of Birth          1950        Visit Information        Provider Department      11/19/2018 9:30 AM Yosef Mccabe MD [facility]        Today's Diagnoses     Benign paroxysmal positional vertigo, unspecified laterality    -  1       Follow-ups after your visit        Additional Services     PHYSICAL THERAPY REFERRAL       If you have not heard from the scheduling office within 2 business days, please call [phone] for all locations, with the exception of [place], please call [phone] and [place], please call [phone].    Please be aware that coverage of these services is subject to the terms and limitations of your health insurance plan.  Call member services at your health plan with any benefit or coverage questions.                  Follow-up notes from your care team     Return in about 11 months (around 10/15/2019) for Preventive Visit; or sooner if PT does not help enough with dizziness.      Who to contact     If you have questions or need follow up information about today's clinic visit or your schedule please contact [facility] directly at [phone].  Normal or non-critical lab and imaging results will be communicated to you by MyChart, letter or phone within 4 business days after the clinic has received the results. If you do not hear from us within 7 days, please contact the clinic through MyChart or phone. If you have a critical or abnormal lab result, we will notify you by phone as soon as possible.  Submit refill requests through [employer] or call your pharmacy and they will forward the refill request to us. Please allow 3 business days for your refill to be completed.          Additional Information About Your Visit        Care EveryWhere ID     This is your Care EveryWhere ID. This could be used by other organizations to access your  "[place] medical records  [id number]        Your Vitals Were     Pulse Temperature Height Pulse Oximetry Breastfeeding? BMI (Body Mass Index)    72 98.6  F (37  C) (Tympanic) 4' 9.5\" (1.461 m) 98% No 29.98 kg/m2       Blood Pressure from Last 3 Encounters:   11/19/18 124/72   10/08/18 138/71   06/06/18 140/72    Weight from Last 3 Encounters:   11/19/18 141 lb (64 kg)   10/08/18 142 lb 14.4 oz (64.8 kg)   06/06/18 145 lb 6.4 oz (66 kg)              We Performed the Following     PHYSICAL THERAPY REFERRAL        Primary Care Provider Office Phone # Fax #    Yosef Mccabe [phone] [address] [address]        Equal Access to Services     [facility]: Hadii david plasencia hadasho Soelena, waaxda luqadaha, qaybta kaalmada adeegyada, eleazar alonzo haynarcisa pisano . So [facility] [phone].    ATENCIÓN: Si habla español, tiene a spring disposición servicios gratuitos de asistencia lingüística. Llame al [phone].    We comply with applicable federal civil rights laws and [place] laws. We do not discriminate on the basis of race, color, national origin, age, disability, sex, sexual orientation, or gender identity.            Thank you!     Thank you for choosing [facility]  for your care. Our goal is always to provide you with excellent care. Hearing back from our patients is one way we can continue to improve our services. Please take a few minutes to complete the written survey that you may receive in the mail after your visit with us. Thank you!             Your Updated Medication List - Protect others around you: Learn how to safely use, store and throw away your medicines at www.disposemymeds.org.          This list is accurate as of 11/19/18 10:04 AM.  Always use your most recent med list.                   Brand Name Dispense Instructions for use Diagnosis    Biotin 1 MG Caps      Take 1 capsule by mouth daily        levothyroxine 100 MCG tablet    " SYNTHROID/LEVOTHROID    90 tablet    Take 1 tablet (100 mcg) by mouth daily Must be seen by new primary provider in planned October visit for additional refills    Hypothyroidism, unspecified type       ranitidine 150 MG tablet    ZANTAC     Take 1 tablet (150 mg) by mouth daily        Turmeric Curcumin Caps      Take 1 capsule by mouth daily        TYLENOL 500 MG tablet   Generic drug:  acetaminophen      Take 1,000 mg by mouth every 8 hours as needed for mild pain

## 2018-11-19 NOTE — PROGRESS NOTES
SUBJECTIVE:   Keyonna De La Garza is a 68 year old female who presents to clinic today for the following health issues:      Chief Complaint   Patient presents with     RECHECK     Wants Shingrix, discussed dizziness         Pleasant 68-year-old female with history of hypothyroidism and degenerative joint disease.  She presents with complaints of a more than 4-month history of intermittent dizziness associated with head movement.  These dizziness spells last for seconds and resolve spontaneously.  They are characterized as mild to moderate.  She describes true vertigo symptoms associated with the spells.  She denies associated hearing loss or tinnitus.    Problem list and histories reviewed & adjusted, as indicated.  Additional history: as documented    Patient Active Problem List   Diagnosis     Advanced directives, counseling/discussion     Hypothyroidism     CARDIOVASCULAR SCREENING; LDL GOAL LESS THAN 130     GERD (gastroesophageal reflux disease)     Osteopenia     Cholecystitis     Lumbago     Past Surgical History:   Procedure Laterality Date     COLONOSCOPY       LAPAROSCOPIC CHOLECYSTECTOMY  4/24/2013    Procedure: LAPAROSCOPIC CHOLECYSTECTOMY;  Laparoscopic cholecystectomy.;  Surgeon: Bnog Pyle MD;  Location:  OR       Social History   Substance Use Topics     Smoking status: Never Smoker     Smokeless tobacco: Never Used     Alcohol use 0.0 oz/week     0 Standard drinks or equivalent per week      Comment: 2 drinks per week     Family History   Problem Relation Age of Onset     C.A.D. Mother      Cerebrovascular Disease Father          Current Outpatient Prescriptions   Medication Sig Dispense Refill     acetaminophen (TYLENOL) 500 MG tablet Take 1,000 mg by mouth every 8 hours as needed for mild pain       Biotin 1 MG CAPS Take 1 capsule by mouth daily       levothyroxine (SYNTHROID/LEVOTHROID) 100 MCG tablet Take 1 tablet (100 mcg) by mouth daily Must be seen by new primary provider in  "planned October visit for additional refills 90 tablet 3     [facility] Natural Products (TURMERIC CURCUMIN) CAPS Take 1 capsule by mouth daily       ranitidine (ZANTAC) 150 MG tablet Take 1 tablet (150 mg) by mouth daily       Allergies   Allergen Reactions     Darvon [Aspirin]        Reviewed and updated as needed this visit by clinical staff  Tobacco  Allergies  Meds       Reviewed and updated as needed this visit by Provider         ROS:    She denies focal numbness weakness or vision change or dysarthria.  No new nasal congestion, sore throat, fever  OBJECTIVE:     /72 (BP Location: Right arm, Cuff Size: Adult Regular)  Pulse 72  Temp 98.6  F (37  C) (Tympanic)  Ht 4' 9.5\" (1.461 m)  Wt 141 lb (64 kg)  SpO2 98%  Breastfeeding? No  BMI 29.98 kg/m2  Body mass index is 29.98 kg/(m^2).  General: This is a well-appearing, comfortable appearing middle-aged female no acute distress.  HEENT: Bilateral tympanic membranes appear normal, the nasal and oral exam are normal.  Cardiovascular: The heart has a regular rate and rhythm, no carotid bruits.  Neurological: Alert and oriented to person place and time, cranial nerves II to XII appear grossly intact, no pronator drift, cerebellar testing within normal limits, negative Romberg test, grossly normal and symmetric strength in all muscle groups, normal gait, the Yoav-Hallpike maneuver reproduces symptoms and results in horizontal nystagmus when the head is turned to the left        ASSESSMENT/PLAN:       1. Benign paroxysmal positional vertigo, unspecified laterality  Symptoms and exam are most consistent with benign paroxysmal positional vertigo.  The pathophysiology and prognosis of this condition were discussed with patient in detail.  She has had symptoms for an extended period of time, and as such, may benefit from canalith repositioning maneuvers.  She is referred to physical therapy for that purpose.  If her symptoms fail to improve after 4-6 weeks of " physical therapy interventions, consider brain MRI to further evaluate, sooner if symptoms worsen.  She will contact me if that is the case.  - PHYSICAL THERAPY REFERRAL      She checked with her insurance, and she is covered to get Shingrix in the clinic today.  She will get her first dose today.      Yosef Mccabe MD  [facility]

## 2018-12-10 ENCOUNTER — HOSPITAL ENCOUNTER (OUTPATIENT)
Dept: PHYSICAL THERAPY | Facility: CLINIC | Age: 68
Setting detail: THERAPIES SERIES
End: 2018-12-10
Attending: INTERNAL MEDICINE
Payer: COMMERCIAL

## 2018-12-10 PROCEDURE — 97161 PT EVAL LOW COMPLEX 20 MIN: CPT | Mod: GP | Performed by: PHYSICAL THERAPIST

## 2018-12-10 PROCEDURE — 95992 CANALITH REPOSITIONING PROC: CPT | Mod: GP | Performed by: PHYSICAL THERAPIST

## 2018-12-10 NOTE — PROGRESS NOTES
" 12/10/18 1200   Quick Adds   Quick Adds Vestibular Eval   Type of Visit Initial OP PT Evaluation   General Information   Start of Care Date 12/10/18   Referring Physician Yosef Mccabe MD    Orders Evaluate and Treat as Indicated   Order Date 11/19/18   Medical Diagnosis Benign paroxysmal positional vertigo, unspecified laterality   Onset of illness/injury or Date of Surgery 07/12/18   Precautions/Limitations no known precautions/limitations   Surgical/Medical history reviewed Yes   Pertinent history of current problem (include personal factors and/or comorbidities that impact the POC) Pt presents with intermittent dizziness related to body positioning and head movements occurring since July. Dizzy spells are described as the room \"taking off/spinning\" and last for a few seconds then resolve independently. Happens mostly in morning when arising from bed or rolling in bed. Pt notes that once she is upright she has no problem getting through her day. Sometimes when she bends down she has dizziness but mostly just with lying down and rolling in bed. Has had recent symptoms related to sinus infection for about 3 months. 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 No   Have you fallen and had an injury in the past year? No   Is patient a fall risk? No   System Outcome Measures   Outcome Measures BPPV   Dizziness Handicap Inventory (score out of 100) A decrease in score by 17.18 or greater indicates a clinically significant change in symptoms. 26   Pain   Patient currently in pain Yes   Pain " comments In back, had some discomfort in neck with yoav-hallpike on R   Cognitive Status Examination   Orientation orientation to person, place and time   Level of Consciousness alert   Follows Commands and Answers Questions able to follow multistep instructions   Personal Safety and Judgment intact   Memory intact   Observation   Observation Steady coming back to treatment room   Posture   Posture Normal   Range of Motion (ROM)   ROM Comment Neck WFL   Bed Mobility   Bed Mobility Comments Independent   Transfer Skills   Transfer Comments Needed Mod UE assist with supine --> sit   Gait   Gait Comments steady coming back to treatment room   Balance Special Tests   Balance Special Tests Modified CTSIB Conditions   Balance Special Tests Modified CTSIB Conditions   Condition 1, seconds 30 Seconds   Condition 2, seconds 30 Seconds   Condition 4, seconds 30 Seconds   Condition 5, seconds 30 Seconds   Modified CTSIB Comments More swaying with condition 5   Cervicogenic Screen   Neck ROM WFL   Oculomotor Exam   Smooth Pursuit Normal   Saccades Normal   VOR Comments Some dizziness   Rapid Head Thrust Normal   Infrared Goggle Exam or Frenzel Lense Exam   Vestibular Suppressant in Last 24 Hours? No   Exam completed with Infrared Goggles   Spontaneous Nystagmus Negative   Gaze Evoked Nystagmus Negative   Head Shake Horizontal Nystagmus Negative   Yoav-Hallpike (right) Upbeating   [place]-Hallpike (right) comments Latency 3-5 seconds, duration +1 minute   Yoav-Hallpike (Left) Upbeating L torsional   [place]-Hallpike (left) comments Latency 1-2 seconds, duration 10 seconds   BPPV Canal(s) L Posterior  (Possible R Posterior )   BPPV Type Canalithasis   Planned Therapy Interventions   Planned Therapy Interventions neuromuscular re-education   Planned Therapy Interventions Comment Standard Canalith Repositioning   Clinical Impression   Criteria for Skilled Therapeutic Interventions Met yes, treatment indicated   PT Diagnosis L PC  Canalithiasis, L PC Cupulothiasis   Influenced by the following impairments Positive positional testing, variable dizziness, back pain   Functional limitations due to impairments Easy transfers, bed mobility   Clinical Presentation Evolving/Changing   Clinical Presentation Rationale Positional testing,    Clinical Decision Making (Complexity) Low complexity   Therapy Frequency 1 time/week   Predicted Duration of Therapy Intervention (days/wks) 30 days   Risk & Benefits of therapy have been explained Yes   Patient, Family & other staff in agreement with plan of care Yes   Clinical Impression Comments Upbeating nystagmus in R and L anni hallpike, consider central causes if no response to treatment - pt did note hx of head injury   GOALS   PT Eval Goals 1;2   Goal 1   Goal Identifier BPPV   Goal Description Pt will be negative with all positional testing to show resolution of BPPV for improved bed mobility and decreased dizziness.   Target Date 01/09/19   Goal 2   Goal Identifier DHI   Goal Description Pt will score less than 15 on the DHI to show decreased dizziness with everyday activity and return to prior function.    Target Date 01/09/19   Total Evaluation Time   PT Eval, Low Complexity Minutes (46901) 30

## 2018-12-17 ENCOUNTER — HOSPITAL ENCOUNTER (OUTPATIENT)
Dept: PHYSICAL THERAPY | Facility: CLINIC | Age: 68
Setting detail: THERAPIES SERIES
End: 2018-12-17
Attending: INTERNAL MEDICINE
Payer: COMMERCIAL

## 2018-12-17 PROCEDURE — 97112 NEUROMUSCULAR REEDUCATION: CPT | Mod: GP | Performed by: PHYSICAL THERAPIST

## 2019-01-05 ENCOUNTER — APPOINTMENT (OUTPATIENT)
Dept: CT IMAGING | Facility: CLINIC | Age: 69
End: 2019-01-05
Payer: COMMERCIAL

## 2019-01-05 ENCOUNTER — APPOINTMENT (OUTPATIENT)
Dept: GENERAL RADIOLOGY | Facility: CLINIC | Age: 69
End: 2019-01-05
Payer: COMMERCIAL

## 2019-01-05 ENCOUNTER — HOSPITAL ENCOUNTER (EMERGENCY)
Facility: CLINIC | Age: 69
Discharge: HOME | End: 2019-01-05
Attending: EMERGENCY MEDICINE | Admitting: EMERGENCY MEDICINE
Payer: COMMERCIAL

## 2019-01-05 VITALS
BODY MASS INDEX: 27.88 KG/M2 | OXYGEN SATURATION: 98 % | RESPIRATION RATE: 16 BRPM | DIASTOLIC BLOOD PRESSURE: 78 MMHG | SYSTOLIC BLOOD PRESSURE: 187 MMHG | TEMPERATURE: 98.5 F | WEIGHT: 142 LBS | HEIGHT: 60 IN

## 2019-01-05 DIAGNOSIS — M25.572 ACUTE LEFT ANKLE PAIN: ICD-10-CM

## 2019-01-05 PROCEDURE — 73700 CT LOWER EXTREMITY W/O DYE: CPT | Mod: LT

## 2019-01-05 PROCEDURE — 73610 X-RAY EXAM OF ANKLE: CPT | Mod: LT

## 2019-01-05 PROCEDURE — 99284 EMERGENCY DEPT VISIT MOD MDM: CPT | Mod: 25

## 2019-01-05 ASSESSMENT — MIFFLIN-ST. JEOR: SCORE: 1100.61

## 2019-01-05 ASSESSMENT — ENCOUNTER SYMPTOMS: ARTHRALGIAS: 1

## 2019-01-05 NOTE — ED AVS SNAPSHOT
Emergency Department  [address] [place] [address]  Phone:  [phone]  Fax:  [phone]                                    Keyonna De La Garza   MRN: [id number]    Department:   Emergency Department   Date of Visit:  1/5/2019           After Visit Summary Signature Page    I have received my discharge instructions, and my questions have been answered. I have discussed any challenges I see with this plan with the nurse or doctor.    ..........................................................................................................................................  Patient/Patient Representative Signature      ..........................................................................................................................................  Patient Representative Print Name and Relationship to Patient    ..................................................               ................................................  Date                                   Time    ..........................................................................................................................................  Reviewed by Signature/Title    ...................................................              ..............................................  Date                                               Time          22EPIC Rev 08/18

## 2019-01-05 NOTE — DISCHARGE INSTRUCTIONS
Follow up with primary care provider or [facility] if not improving early next week    Discharge Instructions  Extremity Injury    You were seen today for an injury to an extremity (arm, hand, leg, or foot). You may have a bruise, strain, or fracture (broken bone).    Generally, every Emergency Department visit should have a follow-up clinic visit with either a primary or a specialty clinic/provider. Please follow-up as instructed by your emergency provider today.  Return to the Emergency Department right away if:  Your pain seems to change or get worse or there is pain in a new area that wasn?t evaluated today.  Your extremity becomes pale, cool, blue, or numb or tingling past the injury.  You have more drainage, redness or pain in the area of the cut or abrasion.  You have pain that you cannot control with the medicine recommended or prescribed here, or you have pain that seems too much for your injury.  Your child (who is injured) will not stop crying or is much more fussy than normal.  You have new symptoms or anything that worries you.    What to Expect:  Your swelling and pain may be worse the day after your injury, but should not be severe and should start getting better after that. You should not have new symptoms and your pain should not get worse.  You may start to get a bruise over the injured area or below the injured area (bruising can follow gravity).  Your movement and strength should get better with time.  Some injuries may not show up until after you have left the Emergency Department so it is important to follow-up as directed.  Your injury may prevent you from working.  Follow-up with your regular provider to get a work release note.  Pain medications or your injury may make it unsafe to drive or operate machinery.    Home Care:  RICE: Rest, Ice, Compression, Elevation  Rest: Rest your injured area for at least 1-2 days. After that you may start using your extremity again as long as  there is not too much pain.   Ice: Apply ice your injured area for 15 minutes at a time, at least 3 times a day. Use a cloth between the ice bag and your skin to prevent frostbite. Do not sleep with an ice pack or heating pad on, since this can cause burns or skin injury.  Compression: You may use an elastic bandage (Ace  Wrap) if it makes you more comfortable. Wrap it just tight enough to provide light compression, like a new pair of socks feels. Loosen the bandage if you have swelling past the bandage.  Elevation: Raise the injured area above the level of your heart as much as possible in the first 1-2 days.    Use Tylenol  (acetaminophen), Motrin (ibuprofen), or Advil  (ibuprofen) for your pain unless you have an allergy or are told not to use these medications by your provider.  Take the medications as instructed on the package. Tylenol  (acetaminophen) is in many prescription medicines and non-prescription medicines--check all of your medicines to be sure you aren?t taking more than 3000 mg per day.  Please follow any other instructions that were discussed with you by your provider.    Stretching/Exercises:  You may have been provided with instructions for stretching or exercises. If your injury was to your arm or shoulder and your provider put you in a sling or an immobilizer, it is important that you take off your immobilizer within 3 days and stretch/move your shoulder, unless your provider specifically tells you to not move your shoulder.  This is to prevent further injury such as a ?frozen shoulder?.     If you were given a prescription for medicine here today, be sure to read all of the information (including the package insert) that comes with your prescription.  This will include important information about the medicine, its side effects, and any warnings that you need to know about.  The pharmacist who fills the prescription can provide more information and answer questions you may have about the  medicine.  If you have questions or concerns that the pharmacist cannot address, please call or return to the Emergency Department.     Remember that you can always come back to the Emergency Department if you are not able to see your regular provider in the amount of time listed above, if you get any new symptoms, or if there is anything that worries you.

## 2019-01-05 NOTE — ED NOTES
Patient discharged in stable condition. Patient received follow-up instructions, air splint, crutches, and 0RXs. No questions at time of discharge.

## 2019-01-05 NOTE — ED PROVIDER NOTES
History     Chief Complaint:  Left Ankle Pain    HPI   Keyonna De La Garza is a 68 year old female who presents with left ankle pain. The patient reports that she was walking this past Wednesday, 1-2-19, when she stepped funny and had pain on ankle after this. Did not fall. She reports that her ankle is stiff, in pain, and she is unable to put weight on it. The patient reports that the pain is concentrated on her inner ankle. The patient iced her ankle and also soaked it in a epsom salt bath, however nothing has seemed to help improve her ankle pain. The patient denies falling or tripping. No fever or leg swelling.    Allergies:  Darvon     Medications:    Tylenol  Biotin  Synthroid  Tumeric  Zantac    Past Medical History:    Gallstones  GERD  Hypothyroidism  Osteopenia  Cholecystitis  Lumbago    Past Surgical History:    Colonoscopy  Laparoscopic Cholecystectomy      Family History:    Mother: CAD  Father: Cerebrovascular Disease    Social History:  Smoking Status: Never Smoker  Alcohol Use: Yes  Patient presents with another person.  Marital Status:  Single      Review of Systems   Musculoskeletal: Positive for arthralgias and gait problem.   All other systems reviewed and are negative.    Physical Exam   First Vitals:  BP: 187/78  Temp: 98.5  F (36.9  C)  Resp: 16  Height: 152.4 cm (5')  Weight: 64.4 kg (142 lb)  SpO2: 98 %     Patient Vitals for the past 24 hrs:   BP Temp Temp src Resp SpO2 Height Weight   01/05/19 0713 187/78 98.5  F (36.9  C) Oral 16 98 % 1.524 m (5') 64.4 kg (142 lb)     Physical Exam   General: Resting comfortably on the gurney  Eyes:  The pupils are equal and round    Conjunctivae and sclerae are normal  ENT:    Moist mucous membranes  Neck:  Normal range of motion  CV:  Regular rate and rhythm    Skin warm and well perfused     DP/PT pulses 2+ bilaterally  Resp:  Lungs are clear    Non-labored    No rales    No wheezing   MS:  Mild swelling on left medial ankle with minimal erythema, no  proximal leg swelling. Tenderness on medial left ankle, full ROM of left ankle w/o pain. No tenderness on calf, prox fibula, knee, foot on left  Skin:  See MS exam above  Neuro:   Awake, alert.      Speech is normal and fluent.    Face is symmetric.     Moves all extremities equally    SILT on bilateral LE  Psych: Normal affect.  Appropriate interactions.    Emergency Department Course     Imaging:  Radiographic findings were communicated with the patient who voiced understanding of the findings.  XR ankle Left G/E 3 Views  There is cortical irregularity of the distal tip of the  medial malleolus, which could reflect an age-indeterminate fracture.  Given the lack of medial malleolar swelling, this is likely old.  Recommend clinical correlation and further evaluation with CT if  warranted. Ankle mortise is intact.  As read by Radiology.    CT Ankle Left w/o Contrast  1. No evidence of acute fracture.  2. Chronic unfused ossicle or old ununited fracture fragment inferior  to the medial malleolus. This is not an acute abnormality.   As read by Radiology.    Emergency Department Course:    Past medical records, nursing notes, and vitals reviewed.  0715: I performed an exam of the patient and obtained history, as documented above.    The patient was sent for a Xray and CT scan while in the emergency department, findings above.    0732: I rechecked the patient.  Findings and plan explained to the Patient. Patient discharged home with instructions regarding supportive care, medications, and reasons to return. The importance of close follow-up was reviewed.       Impression & Plan      Medical Decision Making:  Keyonna De La Garza is a 68-year-old female who presented to the emergency department with a left ankle pain.  Patient with left ankle pain after injuring it a few days ago.  Reports increase in stiffness and difficult to weight-bear on the ankle.  X-ray shows possible fracture.  CT recommended if wanted to see if  there is an acute fracture.  I discussed options with the patient including treating as fracture and follow-up with orthopedics versus getting the CT to know for sure if fracture and she wanted to do the CT scan. CT scan shows no acute injury. LIkely ligamentous injury.  Unlikely septic arthritis given that she is able to move her ankle without pain.  No evidence of abscess, necrotizing fasciitis, cellulitis, compartment syndrome, DVT.  Placed in removal air stirrup, crutches. Recommended f/u with orthopedics or PCP if not improving.  Recommended watching for significant redness, fever, swelling as this could be concerning for other etiology of pain.     Diagnosis:    ICD-10-CM    1. Acute left ankle pain M25.572        Disposition:  discharged to home    Meg Gonzáles  1/5/2019    EMERGENCY DEPARTMENT  I, Meg Gonzáles, am serving as a scribe at 8:10 AM on 1/5/2019 to document services personally performed by Josey Hair, based on my observations and the provider's statements to me.          Josey Hair MD  01/05/19 7968

## 2019-01-14 ENCOUNTER — HOSPITAL ENCOUNTER (OUTPATIENT)
Dept: PHYSICAL THERAPY | Facility: CLINIC | Age: 69
Setting detail: THERAPIES SERIES
End: 2019-01-14
Attending: INTERNAL MEDICINE
Payer: COMMERCIAL

## 2019-01-14 PROCEDURE — 97112 NEUROMUSCULAR REEDUCATION: CPT | Mod: GP | Performed by: PHYSICAL THERAPIST

## 2019-01-21 ENCOUNTER — HOSPITAL ENCOUNTER (OUTPATIENT)
Dept: PHYSICAL THERAPY | Facility: CLINIC | Age: 69
Setting detail: THERAPIES SERIES
End: 2019-01-21
Attending: INTERNAL MEDICINE
Payer: COMMERCIAL

## 2019-01-21 PROCEDURE — 97112 NEUROMUSCULAR REEDUCATION: CPT | Mod: GP | Performed by: PHYSICAL THERAPIST

## 2019-01-21 NOTE — PROGRESS NOTES
Outpatient Physical Therapy Discharge Note     Patient: Keyonna De La Garza  : 1950    Beginning/End Dates of Reporting Period:  12/10/18 to 2019    Referring Provider: Dr Yosef Mccabe    Therapy Diagnosis:      Client Self Report: Feeling really good today. No dizziness with quick head turns or rolling over in bed.     Objective Measurements:  Objective Measure: DHI  Details: 0  Objective Measure: Retested DVA. Static acuity 0.3 logMaR, 1 Hz 0.4 logMar, 2 Hz 0.6 logMar (just a little dizziness afterwards, seemed to struggle a little more on 2 Hz speed today.          Outcome Measures (most recent score):  DHI 0            Goals:  Goal Identifier BPPV   Goal Description Pt will be negative with all positional testing to show resolution of BPPV for improved bed mobility and decreased dizziness.    Target Date 19   Date Met      Progress:  Goal met.      Goal Identifier DHI   Goal Description Pt will score less than 15 on the DHI to show decreased dizziness with everyday activity and return to prior function.    Target Date 19   Date Met      Progress:  Goal met.        Progress Toward Goals:   Progress this reporting period: Client reporting feeling really good. Scoring a 0 on DHI and all positional testing is negative now. Feels ready for discharge    Plan:  Discharge from therapy.    Discharge:    Reason for Discharge: Patient has met all goals.    Equipment Issued: none    Discharge Plan: Patient to continue home program.

## 2019-02-04 ENCOUNTER — ALLIED HEALTH/NURSE VISIT (OUTPATIENT)
Dept: NURSING | Facility: CLINIC | Age: 69
End: 2019-02-04
Payer: COMMERCIAL

## 2019-02-04 DIAGNOSIS — E03.9 HYPOTHYROIDISM, UNSPECIFIED TYPE: ICD-10-CM

## 2019-02-04 DIAGNOSIS — Z23 NEED FOR VACCINATION: Primary | ICD-10-CM

## 2019-02-04 PROCEDURE — 90750 HZV VACC RECOMBINANT IM: CPT

## 2019-02-04 PROCEDURE — 90471 IMMUNIZATION ADMIN: CPT

## 2019-02-04 PROCEDURE — 99207 ZZC NO CHARGE LOS: CPT

## 2019-02-04 NOTE — PROGRESS NOTES
Prior to injection, verified patient identity using patient's name and date of birth.  Due to injection administration, patient instructed to remain in clinic for 15 minutes  afterwards, and to report any adverse reaction to me immediately.    Screening Questionnaire for Adult Immunization    Are you sick today?   No   Do you have allergies to medications, food, a vaccine component or latex?   No   Have you ever had a serious reaction after receiving a vaccination?   No   Do you have a long-term health problem with heart disease, lung disease, asthma, kidney disease, metabolic disease (e.g. diabetes), anemia, or other blood disorder?   No   Do you have cancer, leukemia, HIV/AIDS, or any other immune system problem?   No   In the past 3 months, have you taken medications that affect  your immune system, such as prednisone, other steroids, or anticancer drugs; drugs for the treatment of rheumatoid arthritis, Crohn s disease, or psoriasis; or have you had radiation treatments?   No   Have you had a seizure, or a brain or other nervous system problem?   No   During the past year, have you received a transfusion of blood or blood     products, or been given immune (gamma) globulin or antiviral drug?   No   For women: Are you pregnant or is there a chance you could become        pregnant during the next month?   No   Have you received any vaccinations in the past 4 weeks?   No     Immunization questionnaire answers were all negative.        Per orders of Dr. Mccabe, injection of Shingrix (2nd dose) given by Scout Wood. Patient instructed to remain in clinic for 15 minutes afterwards, and to report any adverse reaction to me immediately.       Screening performed by Scout Wood on 2/4/2019 at 10:16 AM.

## 2019-02-06 RX ORDER — LEVOTHYROXINE SODIUM 100 UG/1
100 TABLET ORAL DAILY
Refills: 0
Start: 2019-02-06

## 2019-02-06 NOTE — TELEPHONE ENCOUNTER
"Levothyroxine 100 mcg    Last Written Prescription Date:  10/08/18  Last Fill Quantity: 90 tablets,  # refills: 3   Last office visit: 11/19/2018 with prescribing provider:  Eliot   Future Office Visit:      Requested Prescriptions   Pending Prescriptions Disp Refills     levothyroxine (SYNTHROID/LEVOTHROID) 100 MCG tablet 90 tablet 3     Sig: Take 1 tablet (100 mcg) by mouth daily Must be seen by new primary provider in planned October visit for additional refills    Thyroid Protocol Passed - 2/6/2019  2:33 PM       Passed - Patient is 12 years or older       Passed - Recent (12 mo) or future (30 days) visit within the authorizing provider's specialty    Patient had office visit in the last 12 months or has a visit in the next 30 days with authorizing provider or within the authorizing provider's specialty.  See \"Patient Info\" tab in inbasket, or \"Choose Columns\" in Meds & Orders section of the refill encounter.             Passed - Medication is active on med list       Passed - Normal TSH on file in past 12 months    Recent Labs   Lab Test 10/08/18  0906   TSH 0.84             Passed - No active pregnancy on record    If patient is pregnant or has had a positive pregnancy test, please check TSH.         Passed - No positive pregnancy test in past 12 months    If patient is pregnant or has had a positive pregnancy test, please check TSH.            "

## 2019-04-02 ENCOUNTER — APPOINTMENT (OUTPATIENT)
Dept: OBGYN | Age: 69
End: 2019-04-02

## 2019-04-19 ENCOUNTER — OFFICE VISIT (OUTPATIENT)
Dept: OBGYN | Age: 69
End: 2019-04-19

## 2019-04-19 VITALS
DIASTOLIC BLOOD PRESSURE: 80 MMHG | WEIGHT: 267 LBS | HEIGHT: 68 IN | SYSTOLIC BLOOD PRESSURE: 128 MMHG | BODY MASS INDEX: 40.47 KG/M2

## 2019-04-19 DIAGNOSIS — Z01.419 GYNECOLOGIC EXAM NORMAL: ICD-10-CM

## 2019-04-19 DIAGNOSIS — Z12.39 SCREENING BREAST EXAMINATION: Primary | ICD-10-CM

## 2019-04-19 DIAGNOSIS — N39.46 MIXED INCONTINENCE URGE AND STRESS: ICD-10-CM

## 2019-04-19 PROCEDURE — G0101 CA SCREEN;PELVIC/BREAST EXAM: HCPCS | Performed by: OBSTETRICS & GYNECOLOGY

## 2019-04-19 RX ORDER — NAPROXEN SODIUM 550 MG/1
550 TABLET ORAL 2 TIMES DAILY WITH MEALS
COMMUNITY

## 2019-04-19 RX ORDER — VALSARTAN 160 MG/1
160 TABLET ORAL DAILY
COMMUNITY
End: 2019-08-04 | Stop reason: ALTCHOICE

## 2019-04-19 RX ORDER — TOLTERODINE TARTRATE 2 MG/1
2 TABLET, EXTENDED RELEASE ORAL 2 TIMES DAILY
COMMUNITY

## 2019-04-19 RX ORDER — METOPROLOL SUCCINATE 25 MG/1
25 TABLET, EXTENDED RELEASE ORAL DAILY
COMMUNITY
End: 2019-08-04 | Stop reason: ALTCHOICE

## 2019-04-19 RX ORDER — FEXOFENADINE HCL 180 MG/1
180 TABLET ORAL DAILY
COMMUNITY

## 2019-04-30 ENCOUNTER — HOSPITAL (OUTPATIENT)
Dept: OTHER | Age: 69
End: 2019-04-30
Attending: OBSTETRICS & GYNECOLOGY

## 2019-06-24 ENCOUNTER — TRANSFERRED RECORDS (OUTPATIENT)
Dept: HEALTH INFORMATION MANAGEMENT | Facility: CLINIC | Age: 69
End: 2019-06-24

## 2019-07-29 ENCOUNTER — HOSPITAL (OUTPATIENT)
Dept: OTHER | Age: 69
End: 2019-07-29

## 2019-07-29 ENCOUNTER — DIAGNOSTIC TRANS (OUTPATIENT)
Dept: OTHER | Age: 69
End: 2019-07-29

## 2019-07-29 LAB
ALBUMIN SERPL-MCNC: 3.3 G/DL (ref 3.6–5.1)
ALBUMIN/GLOB SERPL: 0.9 {RATIO} (ref 1–2.4)
ALP SERPL-CCNC: 220 UNITS/L (ref 45–117)
ALT SERPL-CCNC: 151 UNITS/L
ANALYZER ANC (IANC): ABNORMAL
ANION GAP SERPL CALC-SCNC: 16 MMOL/L (ref 10–20)
APTT PPP: 28 SEC (ref 22–32)
APTT PPP: NORMAL S
APTT PPP: NORMAL S
AST SERPL-CCNC: 81 UNITS/L
BASOPHILS # BLD: 0 K/MCL (ref 0–0.3)
BASOPHILS NFR BLD: 0 %
BILIRUB SERPL-MCNC: 0.6 MG/DL (ref 0.2–1)
BUN SERPL-MCNC: 21 MG/DL (ref 6–20)
BUN/CREAT SERPL: 24 (ref 7–25)
CALCIUM SERPL-MCNC: 9.7 MG/DL (ref 8.4–10.2)
CHLORIDE SERPL-SCNC: 96 MMOL/L (ref 98–107)
CO2 SERPL-SCNC: 28 MMOL/L (ref 21–32)
CREAT SERPL-MCNC: 0.88 MG/DL (ref 0.51–0.95)
DIFFERENTIAL METHOD BLD: ABNORMAL
EOSINOPHIL # BLD: 0.3 K/MCL (ref 0.1–0.5)
EOSINOPHIL NFR BLD: 3 %
ERYTHROCYTE [DISTWIDTH] IN BLOOD: 13.3 % (ref 11–15)
GLOBULIN SER-MCNC: 3.8 G/DL (ref 2–4)
GLUCOSE SERPL-MCNC: 209 MG/DL (ref 65–99)
HCT VFR BLD CALC: 39.2 % (ref 36–46.5)
HGB BLD-MCNC: 12.8 G/DL (ref 12–15.5)
IMM GRANULOCYTES # BLD AUTO: 0 K/MCL (ref 0–0.2)
IMM GRANULOCYTES NFR BLD: 0 %
INR PPP: 1
INR PPP: NORMAL
LACTATE BLDV-SCNC: 1.5 MMOL/L (ref 0–2)
LYMPHOCYTES # BLD: 0.3 K/MCL (ref 1–4)
LYMPHOCYTES NFR BLD: 3 %
MCH RBC QN AUTO: 28.5 PG (ref 26–34)
MCHC RBC AUTO-ENTMCNC: 32.7 G/DL (ref 32–36.5)
MCV RBC AUTO: 87.3 FL (ref 78–100)
MONOCYTES # BLD: 0.2 K/MCL (ref 0.3–0.9)
MONOCYTES NFR BLD: 2 %
NEUTROPHILS # BLD: 8.6 K/MCL (ref 1.8–7.7)
NEUTROPHILS NFR BLD: 92 %
NEUTS SEG NFR BLD: ABNORMAL %
NRBC (NRBCRE): 0 /100 WBC
PHOSPHATE SERPL-MCNC: 2.8 MG/DL (ref 2.4–4.7)
PLATELET # BLD: 234 K/MCL (ref 140–450)
POTASSIUM SERPL-SCNC: 3.7 MMOL/L (ref 3.4–5.1)
POTASSIUM SERPL-SCNC: ABNORMAL MMOL/L
PROT SERPL-MCNC: 7.1 G/DL (ref 6.4–8.2)
PROTHROMBIN TIME (PRT2): NORMAL
PROTHROMBIN TIME: 10.8 SEC (ref 9.7–11.8)
RBC # BLD: 4.49 MIL/MCL (ref 4–5.2)
SODIUM SERPL-SCNC: 136 MMOL/L (ref 135–145)
WBC # BLD: 9.5 K/MCL (ref 4.2–11)

## 2019-07-30 ENCOUNTER — HOSPITAL (OUTPATIENT)
Dept: OTHER | Age: 69
End: 2019-07-30

## 2019-07-30 LAB
ANALYZER ANC (IANC): ABNORMAL
ANALYZER ANC (IANC): ABNORMAL
ANION GAP SERPL CALC-SCNC: 15 MMOL/L (ref 10–20)
APPEARANCE UR: CLEAR
BACTERIA #/AREA URNS HPF: ABNORMAL /HPF
BASOPHILS # BLD: 0.1 K/MCL (ref 0–0.3)
BASOPHILS # BLD: 0.1 K/MCL (ref 0–0.3)
BASOPHILS NFR BLD: 0 %
BASOPHILS NFR BLD: 1 %
BILIRUB UR QL: NEGATIVE
BUN SERPL-MCNC: 18 MG/DL (ref 6–20)
BUN/CREAT SERPL: 25 (ref 7–25)
CALCIUM SERPL-MCNC: 9 MG/DL (ref 8.4–10.2)
CHLORIDE SERPL-SCNC: 104 MMOL/L (ref 98–107)
CO2 SERPL-SCNC: 25 MMOL/L (ref 21–32)
COLOR UR: YELLOW
CREAT SERPL-MCNC: 0.72 MG/DL (ref 0.51–0.95)
DIFFERENTIAL METHOD BLD: ABNORMAL
DIFFERENTIAL METHOD BLD: ABNORMAL
EOSINOPHIL # BLD: 0.1 K/MCL (ref 0.1–0.5)
EOSINOPHIL # BLD: 0.4 K/MCL (ref 0.1–0.5)
EOSINOPHIL NFR BLD: 0 %
EOSINOPHIL NFR BLD: 2 %
ERYTHROCYTE [DISTWIDTH] IN BLOOD: 13.6 % (ref 11–15)
ERYTHROCYTE [DISTWIDTH] IN BLOOD: 13.7 % (ref 11–15)
GLUCOSE BLDC GLUCOMTR-MCNC: 167 MG/DL (ref 65–99)
GLUCOSE BLDC GLUCOMTR-MCNC: 168 MG/DL (ref 65–99)
GLUCOSE BLDC GLUCOMTR-MCNC: 169 MG/DL (ref 65–99)
GLUCOSE BLDC GLUCOMTR-MCNC: 188 MG/DL (ref 65–99)
GLUCOSE SERPL-MCNC: 234 MG/DL (ref 65–99)
GLUCOSE UR-MCNC: NEGATIVE MG/DL
HCT VFR BLD CALC: 34.6 % (ref 36–46.5)
HCT VFR BLD CALC: 35.6 % (ref 36–46.5)
HGB BLD-MCNC: 11.3 G/DL (ref 12–15.5)
HGB BLD-MCNC: 11.7 G/DL (ref 12–15.5)
HGB UR QL: NEGATIVE
HYALINE CASTS #/AREA URNS LPF: ABNORMAL /LPF (ref 0–5)
IMM GRANULOCYTES # BLD AUTO: 0.1 K/MCL (ref 0–0.2)
IMM GRANULOCYTES # BLD AUTO: 0.3 K/MCL (ref 0–0.2)
IMM GRANULOCYTES NFR BLD: 1 %
IMM GRANULOCYTES NFR BLD: 1 %
KETONES UR-MCNC: 15 MG/DL
LEUKOCYTE ESTERASE UR QL STRIP: NEGATIVE
LYMPHOCYTES # BLD: 0.6 K/MCL (ref 1–4)
LYMPHOCYTES # BLD: 0.7 K/MCL (ref 1–4)
LYMPHOCYTES NFR BLD: 2 %
LYMPHOCYTES NFR BLD: 4 %
MCH RBC QN AUTO: 28.6 PG (ref 26–34)
MCH RBC QN AUTO: 29.1 PG (ref 26–34)
MCHC RBC AUTO-ENTMCNC: 32.7 G/DL (ref 32–36.5)
MCHC RBC AUTO-ENTMCNC: 32.9 G/DL (ref 32–36.5)
MCV RBC AUTO: 87.6 FL (ref 78–100)
MCV RBC AUTO: 88.6 FL (ref 78–100)
MONOCYTES # BLD: 0.8 K/MCL (ref 0.3–0.9)
MONOCYTES # BLD: 0.9 K/MCL (ref 0.3–0.9)
MONOCYTES NFR BLD: 4 %
MONOCYTES NFR BLD: 5 %
NEUTROPHILS # BLD: 17.4 K/MCL (ref 1.8–7.7)
NEUTROPHILS # BLD: 21.4 K/MCL (ref 1.8–7.7)
NEUTROPHILS NFR BLD: 87 %
NEUTROPHILS NFR BLD: 93 %
NEUTS SEG NFR BLD: ABNORMAL %
NEUTS SEG NFR BLD: ABNORMAL %
NITRITE UR QL: NEGATIVE
NRBC (NRBCRE): 0 /100 WBC
NRBC (NRBCRE): 0 /100 WBC
PH UR: 6 UNITS (ref 5–7)
PLATELET # BLD: 214 K/MCL (ref 140–450)
PLATELET # BLD: 227 K/MCL (ref 140–450)
POTASSIUM SERPL-SCNC: 3.5 MMOL/L (ref 3.4–5.1)
PROT UR QL: ABNORMAL MG/DL
RBC # BLD: 3.95 MIL/MCL (ref 4–5.2)
RBC # BLD: 4.02 MIL/MCL (ref 4–5.2)
RBC #/AREA URNS HPF: ABNORMAL /HPF (ref 0–2)
SODIUM SERPL-SCNC: 140 MMOL/L (ref 135–145)
SP GR UR: 1.02 (ref 1–1.03)
SPECIMEN SOURCE: ABNORMAL
SQUAMOUS #/AREA URNS HPF: ABNORMAL /HPF (ref 0–5)
UROBILINOGEN UR QL: 0.2 MG/DL (ref 0–1)
WBC # BLD: 19.6 K/MCL (ref 4.2–11)
WBC # BLD: 23.2 K/MCL (ref 4.2–11)
WBC #/AREA URNS HPF: ABNORMAL /HPF (ref 0–5)

## 2019-07-31 LAB
ALBUMIN SERPL-MCNC: 2.4 G/DL (ref 3.6–5.1)
ALBUMIN/GLOB SERPL: 0.7 {RATIO} (ref 1–2.4)
ALP SERPL-CCNC: 153 UNITS/L (ref 45–117)
ALT SERPL-CCNC: 94 UNITS/L
ANALYZER ANC (IANC): ABNORMAL
ANION GAP SERPL CALC-SCNC: 11 MMOL/L (ref 10–20)
AST SERPL-CCNC: 29 UNITS/L
BILIRUB SERPL-MCNC: 0.4 MG/DL (ref 0.2–1)
BUN SERPL-MCNC: 12 MG/DL (ref 6–20)
BUN/CREAT SERPL: 21 (ref 7–25)
CALCIUM SERPL-MCNC: 8.7 MG/DL (ref 8.4–10.2)
CHLORIDE SERPL-SCNC: 103 MMOL/L (ref 98–107)
CO2 SERPL-SCNC: 28 MMOL/L (ref 21–32)
CREAT SERPL-MCNC: 0.56 MG/DL (ref 0.51–0.95)
ERYTHROCYTE [DISTWIDTH] IN BLOOD: 13.9 % (ref 11–15)
GLOBULIN SER-MCNC: 3.5 G/DL (ref 2–4)
GLUCOSE BLDC GLUCOMTR-MCNC: 145 MG/DL (ref 65–99)
GLUCOSE BLDC GLUCOMTR-MCNC: 152 MG/DL (ref 65–99)
GLUCOSE BLDC GLUCOMTR-MCNC: 170 MG/DL (ref 65–99)
GLUCOSE BLDC GLUCOMTR-MCNC: 183 MG/DL (ref 65–99)
GLUCOSE BLDC GLUCOMTR-MCNC: ABNORMAL MG/DL
GLUCOSE SERPL-MCNC: 149 MG/DL (ref 65–99)
HCT VFR BLD CALC: 33.2 % (ref 36–46.5)
HGB BLD-MCNC: 11 G/DL (ref 12–15.5)
MCH RBC QN AUTO: 28.7 PG (ref 26–34)
MCHC RBC AUTO-ENTMCNC: 33.1 G/DL (ref 32–36.5)
MCV RBC AUTO: 86.7 FL (ref 78–100)
NRBC (NRBCRE): 0 /100 WBC
PLATELET # BLD: 215 K/MCL (ref 140–450)
POTASSIUM SERPL-SCNC: 3.5 MMOL/L (ref 3.4–5.1)
PROT SERPL-MCNC: 5.9 G/DL (ref 6.4–8.2)
RBC # BLD: 3.83 MIL/MCL (ref 4–5.2)
SODIUM SERPL-SCNC: 138 MMOL/L (ref 135–145)
WBC # BLD: 12.4 K/MCL (ref 4.2–11)

## 2019-08-01 LAB
B BURGDOR IGG+IGM SER QL: NEGATIVE
BACTERIA UR CULT: NORMAL
GLUCOSE BLDC GLUCOMTR-MCNC: 143 MG/DL (ref 65–99)
GLUCOSE BLDC GLUCOMTR-MCNC: 163 MG/DL (ref 65–99)
GLUCOSE BLDC GLUCOMTR-MCNC: 183 MG/DL (ref 65–99)
GLUCOSE BLDC GLUCOMTR-MCNC: 194 MG/DL (ref 65–99)
GLUCOSE BLDC GLUCOMTR-MCNC: ABNORMAL MG/DL
URATE SERPL-MCNC: 3.8 MG/DL (ref 2.6–5.9)

## 2019-08-02 LAB
ANALYZER ANC (IANC): ABNORMAL
ANION GAP SERPL CALC-SCNC: 11 MMOL/L (ref 10–20)
BUN SERPL-MCNC: 13 MG/DL (ref 6–20)
BUN/CREAT SERPL: 25 (ref 7–25)
CALCIUM SERPL-MCNC: 9.2 MG/DL (ref 8.4–10.2)
CHLORIDE SERPL-SCNC: 106 MMOL/L (ref 98–107)
CO2 SERPL-SCNC: 30 MMOL/L (ref 21–32)
CREAT SERPL-MCNC: 0.51 MG/DL (ref 0.51–0.95)
ERYTHROCYTE [DISTWIDTH] IN BLOOD: 13.7 % (ref 11–15)
ERYTHROCYTE [SEDIMENTATION RATE] IN BLOOD BY WESTERGREN METHOD: 45 MM/HR (ref 0–20)
GLUCOSE BLDC GLUCOMTR-MCNC: 139 MG/DL (ref 65–99)
GLUCOSE BLDC GLUCOMTR-MCNC: 147 MG/DL (ref 65–99)
GLUCOSE SERPL-MCNC: 183 MG/DL (ref 65–99)
HCT VFR BLD CALC: 35 % (ref 36–46.5)
HGB BLD-MCNC: 11.5 G/DL (ref 12–15.5)
MCH RBC QN AUTO: 28.2 PG (ref 26–34)
MCHC RBC AUTO-ENTMCNC: 32.9 G/DL (ref 32–36.5)
MCV RBC AUTO: 85.8 FL (ref 78–100)
NRBC (NRBCRE): 0 /100 WBC
PLATELET # BLD: 313 K/MCL (ref 140–450)
POTASSIUM SERPL-SCNC: 4.1 MMOL/L (ref 3.4–5.1)
RBC # BLD: 4.08 MIL/MCL (ref 4–5.2)
SODIUM SERPL-SCNC: 143 MMOL/L (ref 135–145)
WBC # BLD: 7.2 K/MCL (ref 4.2–11)
WNV IGM SER IA-ACNC: 0
WNV IGM SER IA-ACNC: NORMAL

## 2019-08-04 LAB
BACTERIA BLD CULT: NORMAL

## 2019-08-19 ENCOUNTER — HOSPITAL ENCOUNTER (OUTPATIENT)
Dept: MAMMOGRAPHY | Facility: CLINIC | Age: 69
Discharge: HOME | End: 2019-08-19
Attending: INTERNAL MEDICINE | Admitting: INTERNAL MEDICINE
Payer: COMMERCIAL

## 2019-08-19 DIAGNOSIS — Z12.31 VISIT FOR SCREENING MAMMOGRAM: ICD-10-CM

## 2019-08-19 PROCEDURE — 77067 SCR MAMMO BI INCL CAD: CPT

## 2019-10-14 ENCOUNTER — TELEPHONE (OUTPATIENT)
Dept: PALLIATIVE MEDICINE | Facility: CLINIC | Age: 69
End: 2019-10-14

## 2019-10-14 ENCOUNTER — OFFICE VISIT (OUTPATIENT)
Dept: FAMILY MEDICINE | Facility: CLINIC | Age: 69
End: 2019-10-14
Payer: COMMERCIAL

## 2019-10-14 VITALS
SYSTOLIC BLOOD PRESSURE: 128 MMHG | OXYGEN SATURATION: 97 % | BODY MASS INDEX: 28.13 KG/M2 | TEMPERATURE: 97.9 F | HEART RATE: 64 BPM | WEIGHT: 134 LBS | HEIGHT: 58 IN | DIASTOLIC BLOOD PRESSURE: 70 MMHG

## 2019-10-14 DIAGNOSIS — Z00.00 ROUTINE GENERAL MEDICAL EXAMINATION AT A HEALTH CARE FACILITY: Primary | ICD-10-CM

## 2019-10-14 DIAGNOSIS — G89.29 CHRONIC MIDLINE THORACIC BACK PAIN: ICD-10-CM

## 2019-10-14 DIAGNOSIS — Z23 NEED FOR PROPHYLACTIC VACCINATION AND INOCULATION AGAINST INFLUENZA: ICD-10-CM

## 2019-10-14 DIAGNOSIS — M54.6 CHRONIC MIDLINE THORACIC BACK PAIN: ICD-10-CM

## 2019-10-14 DIAGNOSIS — E03.9 HYPOTHYROIDISM, UNSPECIFIED TYPE: ICD-10-CM

## 2019-10-14 LAB
ALBUMIN SERPL-MCNC: 4 G/DL (ref 3.4–5)
ALP SERPL-CCNC: 87 U/L (ref 40–150)
ALT SERPL W P-5'-P-CCNC: 23 U/L (ref 0–50)
ANION GAP SERPL CALCULATED.3IONS-SCNC: 7 MMOL/L (ref 3–14)
AST SERPL W P-5'-P-CCNC: 21 U/L (ref 0–45)
BILIRUB SERPL-MCNC: 0.3 MG/DL (ref 0.2–1.3)
BUN SERPL-MCNC: 7 MG/DL (ref 7–30)
CALCIUM SERPL-MCNC: 9.3 MG/DL (ref 8.5–10.1)
CHLORIDE SERPL-SCNC: 106 MMOL/L (ref 94–109)
CHOLEST SERPL-MCNC: 193 MG/DL
CO2 SERPL-SCNC: 26 MMOL/L (ref 20–32)
CREAT SERPL-MCNC: 0.63 MG/DL (ref 0.52–1.04)
ERYTHROCYTE [DISTWIDTH] IN BLOOD BY AUTOMATED COUNT: 13.4 % (ref 10–15)
GFR SERPL CREATININE-BSD FRML MDRD: >90 ML/MIN/{1.73_M2}
GLUCOSE SERPL-MCNC: 90 MG/DL (ref 70–99)
HCT VFR BLD AUTO: 42.9 % (ref 35–47)
HDLC SERPL-MCNC: 69 MG/DL
HGB BLD-MCNC: 14.2 G/DL (ref 11.7–15.7)
LDLC SERPL CALC-MCNC: 104 MG/DL
MCH RBC QN AUTO: 30.1 PG (ref 26.5–33)
MCHC RBC AUTO-ENTMCNC: 33.1 G/DL (ref 31.5–36.5)
MCV RBC AUTO: 91 FL (ref 78–100)
NONHDLC SERPL-MCNC: 124 MG/DL
PLATELET # BLD AUTO: 385 10E9/L (ref 150–450)
POTASSIUM SERPL-SCNC: 4.2 MMOL/L (ref 3.4–5.3)
PROT SERPL-MCNC: 7.9 G/DL (ref 6.8–8.8)
RBC # BLD AUTO: 4.72 10E12/L (ref 3.8–5.2)
SODIUM SERPL-SCNC: 139 MMOL/L (ref 133–144)
T4 FREE SERPL-MCNC: 1.31 NG/DL (ref 0.76–1.46)
TRIGL SERPL-MCNC: 98 MG/DL
TSH SERPL DL<=0.005 MIU/L-ACNC: 0.33 MU/L (ref 0.4–4)
WBC # BLD AUTO: 8.7 10E9/L (ref 4–11)

## 2019-10-14 PROCEDURE — 90471 IMMUNIZATION ADMIN: CPT | Performed by: INTERNAL MEDICINE

## 2019-10-14 PROCEDURE — 85027 COMPLETE CBC AUTOMATED: CPT | Performed by: INTERNAL MEDICINE

## 2019-10-14 PROCEDURE — 84443 ASSAY THYROID STIM HORMONE: CPT | Performed by: INTERNAL MEDICINE

## 2019-10-14 PROCEDURE — 90662 IIV NO PRSV INCREASED AG IM: CPT | Performed by: INTERNAL MEDICINE

## 2019-10-14 PROCEDURE — 36415 COLL VENOUS BLD VENIPUNCTURE: CPT | Performed by: INTERNAL MEDICINE

## 2019-10-14 PROCEDURE — 80061 LIPID PANEL: CPT | Performed by: INTERNAL MEDICINE

## 2019-10-14 PROCEDURE — 99397 PER PM REEVAL EST PAT 65+ YR: CPT | Mod: 25 | Performed by: INTERNAL MEDICINE

## 2019-10-14 PROCEDURE — 84439 ASSAY OF FREE THYROXINE: CPT | Performed by: INTERNAL MEDICINE

## 2019-10-14 PROCEDURE — 99213 OFFICE O/P EST LOW 20 MIN: CPT | Mod: 25 | Performed by: INTERNAL MEDICINE

## 2019-10-14 PROCEDURE — 80053 COMPREHEN METABOLIC PANEL: CPT | Performed by: INTERNAL MEDICINE

## 2019-10-14 ASSESSMENT — MIFFLIN-ST. JEOR: SCORE: 1030.75

## 2019-10-14 ASSESSMENT — ACTIVITIES OF DAILY LIVING (ADL): CURRENT_FUNCTION: NO ASSISTANCE NEEDED

## 2019-10-14 NOTE — PROGRESS NOTES
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  No    Healthy Habits:     In general, how would you rate your overall health?  Good    Frequency of exercise:  2-3 days/week    Duration of exercise:  15-30 minutes    Do you usually eat at least 4 servings of fruit and vegetables a day, include whole grains    & fiber and avoid regularly eating high fat or \"junk\" foods?  Yes    Taking medications regularly:  Yes    Barriers to taking medications:  None    Medication side effects:  None    Ability to successfully perform activities of daily living:  No assistance needed    Home Safety:  No safety concerns identified    Hearing Impairment:  Difficulty following a conversation in a noisy restaurant or crowded room, feel that people are mumbling or not speaking clearly, difficulty understanding soft or whispered speech, need to ask people to speak up or repeat themselves and difficulty understanding speech on the telephone    In the past 6 months, have you been bothered by leaking of urine?  No    In general, how would you rate your overall mental or emotional health?  Very good      PHQ-2 Total Score: 0    Additional concerns today:  Yes    Do you feel safe in your environment? Yes    Do you have a Health Care Directive? Yes: Advance Directive has been received and scanned.      Fall risk  Fallen 2 or more times in the past year?: No  Any fall with injury in the past year?: No    Cognitive Screening   1) Repeat 3 items (Leader, Season, Table)    2) Clock draw: NORMAL  3) 3 item recall: Recalls " 3 objects  Results: 3 items recalled: COGNITIVE IMPAIRMENT LESS LIKELY    Mini-CogTM Copyright GUIDO Ozuna. Licensed by the author for use in [facility]; reprinted with permission (micki@.[facility]). All rights reserved.      Do you have sleep apnea, excessive snoring or daytime drowsiness?: no    Reviewed and updated as needed this visit by clinical staff  Tobacco  Allergies  Meds         Reviewed and updated as needed this visit by Provider        Social History     Tobacco Use     Smoking status: Never Smoker     Smokeless tobacco: Never Used   Substance Use Topics     Alcohol use: Yes     Alcohol/week: 0.0 standard drinks     Comment: 2 drinks per week     If you drink alcohol do you typically have >3 drinks per day or >7 drinks per week? No      Current providers sharing in care for this patient include:   Patient Care Team:  Yosef Mccabe MD as PCP - General (Internal Medicine)  Yosef Mccabe MD as Assigned PCP    The following health maintenance items are reviewed in Epic and correct as of today:  Health Maintenance   Topic Date Due     ADVANCE CARE PLANNING  02/08/2017     INFLUENZA VACCINE (1) 09/01/2019     MEDICARE ANNUAL WELLNESS VISIT  10/08/2019     TSH W/FREE T4 REFLEX  10/08/2019     FALL RISK ASSESSMENT  11/19/2019     MAMMO SCREENING  08/19/2021     DTAP/TDAP/TD IMMUNIZATION (2 - Td) 02/09/2022     LIPID  10/08/2023     COLONOSCOPY  06/24/2029     DEXA  Completed     HEPATITIS C SCREENING  Completed     PHQ-2  Completed     PNEUMOCOCCAL IMMUNIZATION 65+ LOW/MEDIUM RISK  Completed     ZOSTER IMMUNIZATION  Completed     IPV IMMUNIZATION  Aged Out     MENINGITIS IMMUNIZATION  Aged Out     Patient Active Problem List   Diagnosis     Advanced directives, counseling/discussion     Hypothyroidism     CARDIOVASCULAR SCREENING; LDL GOAL LESS THAN 130     GERD (gastroesophageal reflux disease)     Osteopenia     Cholecystitis     Lumbago     Past Surgical History:   Procedure Laterality Date  "    COLONOSCOPY       LAPAROSCOPIC CHOLECYSTECTOMY  4/24/2013    Procedure: LAPAROSCOPIC CHOLECYSTECTOMY;  Laparoscopic cholecystectomy.;  Surgeon: Bong Pyle MD;  Location:  OR       Social History     Tobacco Use     Smoking status: Never Smoker     Smokeless tobacco: Never Used   Substance Use Topics     Alcohol use: Yes     Alcohol/week: 0.0 standard drinks     Comment: 2 drinks per week     Family History   Problem Relation Age of Onset     C.A.D. Mother      Cerebrovascular Disease Father          Current Outpatient Medications   Medication Sig Dispense Refill     acetaminophen (TYLENOL) 500 MG tablet Take 1,000 mg by mouth every 8 hours as needed for mild pain       levothyroxine (SYNTHROID/LEVOTHROID) 100 MCG tablet Take 1 tablet (100 mcg) by mouth daily Must be seen by new primary provider in planned October visit for additional refills 90 tablet 0     ranitidine (ZANTAC) 150 MG tablet Take 1 tablet (150 mg) by mouth daily       Allergies   Allergen Reactions     Darvon [Aspirin] GI Disturbance         Review of Systems  Constitutional, HEENT, cardiovascular, pulmonary, gi and gu systems are negative, except as otherwise noted.    OBJECTIVE:   /70 (BP Location: Right arm, Patient Position: Sitting, Cuff Size: Adult Regular)   Pulse 64   Temp 97.9  F (36.6  C) (Oral)   Ht 1.478 m (4' 10.2\")   Wt 60.8 kg (134 lb)   SpO2 97%   BMI 27.81 kg/m   Estimated body mass index is 27.81 kg/m  as calculated from the following:    Height as of this encounter: 1.478 m (4' 10.2\").    Weight as of this encounter: 60.8 kg (134 lb).  Physical Exam  GENERAL APPEARANCE: healthy, alert and no distress  EYES: Eyes grossly normal to inspection, PERRL and conjunctivae and sclerae normal  HENT: ear canals and TM's normal, nose and mouth without ulcers or lesions, oropharynx clear and oral mucous membranes moist  NECK: no adenopathy, no asymmetry, masses, or scars and thyroid normal to palpation  RESP: lungs " clear to auscultation - no rales, rhonchi or wheezes  BREAST: normal without masses, tenderness or nipple discharge and no palpable axillary masses or adenopathy  CV: regular rate and rhythm, normal S1 S2, no S3 or S4, no murmur, click or rub, no peripheral edema and peripheral pulses strong  ABDOMEN: soft, nontender, no hepatosplenomegaly, no masses and bowel sounds normal  MS: no musculoskeletal defects are noted and gait is age appropriate without ataxia; no midline T spine tenderness   SKIN: no suspicious lesions or rashes  NEURO: Normal strength and tone, sensory exam grossly normal, mentation intact and speech normal  PSYCH: mentation appears normal and affect normal/bright    Diagnostic Test Results:  Labs pending     ASSESSMENT / PLAN:   1. Routine general medical examination at a health care facility    - Comprehensive metabolic panel  - CBC with platelets  - Lipid panel reflex to direct LDL Fasting    2. Chronic midline thoracic back pain  At this point I think we should consider a T spine injection as previously discussed   - PAIN MANAGEMENT REFERRAL    3. Hypothyroidism, unspecified type    - TSH with free T4 reflex    4. Need for prophylactic vaccination and inoculation against influenza    - INFLUENZA (HIGH DOSE) 3 VALENT VACCINE [51772]  - Vaccine Administration, Initial [46918]    End of Life Planning:  Patient currently has an advanced directive: Yes.  Practitioner is supportive of decision.    COUNSELING:  Reviewed preventive health counseling, as reflected in patient instructions  Special attention given to:       Regular exercise       Healthy diet/nutrition       Immunizations    Flu shot today              Consider lung cancer screening for ages 55-80 years and 30 pack-year smoking history ; never smoker        Colon cancer screening; normal colonoscopy in 6/2019       Hepatitis C screening; done        Mammogram:   8/19/19    Estimated body mass index is 27.81 kg/m  as calculated from the  "following:    Height as of this encounter: 1.478 m (4' 10.2\").    Weight as of this encounter: 60.8 kg (134 lb).    Weight management plan: Discussed healthy diet and exercise guidelines     reports that she has never smoked. She has never used smokeless tobacco.      Appropriate preventive services were discussed with this patient, including applicable screening as appropriate for cardiovascular disease, diabetes, osteopenia/osteoporosis, and glaucoma.  As appropriate for age/gender, discussed screening for colorectal cancer, prostate cancer, breast cancer, and cervical cancer. Checklist reviewing preventive services available has been given to the patient.    Reviewed patients plan of care and provided an AVS. The Basic Care Plan (routine screening as documented in Health Maintenance) for Keyonna meets the Care Plan requirement. This Care Plan has been established and reviewed with the Patient.    Counseling Resources:  ATP IV Guidelines  Pooled Cohorts Equation Calculator  Breast Cancer Risk Calculator  FRAX Risk Assessment  ICSI Preventive Guidelines  Dietary Guidelines for Americans, 2010  USDA's MyPlate  ASA Prophylaxis  Lung CA Screening    Yosef Mccabe MD  [facility]    Identified Health Risks:  "

## 2019-10-14 NOTE — LETTER
[facility]  [address] [facility]  [address]  [place]  [address]  Tel: [phone]    October 15, 2019    Keyonna De La Garza  [address] [facility] [address]        Dear Ms. De La Garza,    The following letter pertains to your most recent diagnostic tests:     -TSH (thyroid stimulating hormone) level is a tiny bit low.  This means we may be giving you too much levothyroxine, but I recommend we recheck your thyroid blood test in 6-8 weeks before changing your dose.   You can schedule a lab appointment for that purpose.           -Liver and gallbladder tests are normal for you. (ALT,AST, Alk phos, bilirubin), kidney function is normal for you (Creatinine, GFR), Sodium is normal, Potassium is normal for you, Calcium is normal for you, Glucose (blood sugar) is normal for you.         -Your cholesterol panel looks healthy.     -Your complete blood counts including your hemoglobin returned normal for you.           Bottom line:  Labs look OK.  Watch the saturated fats such as fried food, beet and pork to improve LDL cholesterol.  Eating more fiber like fruits and veggies can improve LDL cholesterol.  We should recheck in one year.  Return in 6-8 weeks to recheck the thyroid test.         Follow up:   Lab appointment in 6-8 weeks for thyroid test.       Sincerely,     Dr. Mccabe /kim Pitts   Comprehensive metabolic panel   Result Value Ref Range    Sodium 139 133 - 144 mmol/L    Potassium 4.2 3.4 - 5.3 mmol/L    Chloride 106 94 - 109 mmol/L    Carbon Dioxide 26 20 - 32 mmol/L    Anion Gap 7 3 - 14 mmol/L    Glucose 90 70 - 99 mg/dL    Urea Nitrogen 7 7 - 30 mg/dL    Creatinine 0.63 0.52 - 1.04 mg/dL    GFR Estimate >90 >60 mL/min/[1.73_m2]      Comment:      Non  GFR Calc  Starting 12/18/2018, serum creatinine based estimated GFR (eGFR) will be   calculated using the Chronic Kidney Disease Epidemiology Collaboration   (CKD-EPI) equation.      GFR Estimate If Black >90 >60  mL/min/[1.73_m2]      Comment:       GFR Calc  Starting 12/18/2018, serum creatinine based estimated GFR (eGFR) will be   calculated using the Chronic Kidney Disease Epidemiology Collaboration   (CKD-EPI) equation.      Calcium 9.3 8.5 - 10.1 mg/dL    Bilirubin Total 0.3 0.2 - 1.3 mg/dL    Albumin 4.0 3.4 - 5.0 g/dL    Protein Total 7.9 6.8 - 8.8 g/dL    Alkaline Phosphatase 87 40 - 150 U/L    ALT 23 0 - 50 U/L    AST 21 0 - 45 U/L   CBC with platelets   Result Value Ref Range    WBC 8.7 4.0 - 11.0 10e9/L    RBC Count 4.72 3.8 - 5.2 10e12/L    Hemoglobin 14.2 11.7 - 15.7 g/dL    Hematocrit 42.9 35.0 - 47.0 %    MCV 91 78 - 100 fl    MCH 30.1 26.5 - 33.0 pg    MCHC 33.1 31.5 - 36.5 g/dL    RDW 13.4 10.0 - 15.0 %    Platelet Count 385 150 - 450 10e9/L   Lipid panel reflex to direct LDL Fasting   Result Value Ref Range    Cholesterol 193 <200 mg/dL    Triglycerides 98 <150 mg/dL    HDL Cholesterol 69 >49 mg/dL    LDL Cholesterol Calculated 104 (H) <100 mg/dL      Comment:      Above desirable:  100-129 mg/dl  Borderline High:  130-159 mg/dL  High:             160-189 mg/dL  Very high:       >189 mg/dl      Non HDL Cholesterol 124 <130 mg/dL   TSH with free T4 reflex   Result Value Ref Range    TSH 0.33 (L) 0.40 - 4.00 mU/L   T4 free   Result Value Ref Range    T4 Free 1.31 0.76 - 1.46 ng/dL

## 2019-10-14 NOTE — LETTER
October 28, 2019    Keyonna De La Garza  [address] [facility] [address]    Dear Keyonna                                                                 Welcome to the [facility].  We are located at [address] [place] [address] [place] [address]. Your appointment at the [facility] has been scheduled on Monday, December 2ND at 10:00AM with Rickey Soto MD.    At your first visit, you will meet your team of caregivers who will help you to develop pain management strategies that will last a lifetime. You will meet with our support staff to review your insurance information, and collect your co-payment if required by your insurance company. You will also meet with a medical pain specialist and care coordinator who will assess your pain and develop a plan of care for your successful pain rehabilitation. You should expect to spend 1-2 hours at your first visit with us. Usually, patients work with us for a period of 6-12 months, and eventually return to their primary doctor once their pain management has stabilized.      To help us make your visit go as smoothly as possible, please bring the following items with you on your visit:     Completed Pain Questionnaire enclosed in this packet.  If you do not bring the completed questionnaire, we may have to reschedule your appointment.  List of any medicines that you are currently taking or have been prescribed  Important NON-[facility] medical information such as medical records or tests results (X-rays, or laboratory tests)  Your health insurance card  Financial resources to cover your co-payment or balance due at the time of service (cash, personal check, Visa, and MasterCard are acceptable methods of payment)     Due to the demand for new patient evaluations, you must notify the scheduling department 48 hours in advance if you are not able to keep this appointment. Failure to do so could affect your ability to reschedule with our clinic.  Please be aware that we will not prescribe any medications at your first visit.     Please call [phone] with any questions regarding your appointment. We look forward to meeting you and working to address your health care needs.     Sincerely,      [facility]

## 2019-10-15 NOTE — RESULT ENCOUNTER NOTE
The following letter pertains to your most recent diagnostic tests:    -TSH (thyroid stimulating hormone) level is a tiny bit low.  This means we may be giving you too much levothyroxine, but I recommend we recheck your thyroid blood test in 6-8 weeks before changing your dose.   You can schedule a lab appointment for that purpose.        -Liver and gallbladder tests are normal for you. (ALT,AST, Alk phos, bilirubin), kidney function is normal for you (Creatinine, GFR), Sodium is normal, Potassium is normal for you, Calcium is normal for you, Glucose (blood sugar) is normal for you.        -Your cholesterol panel looks healthy.     -Your complete blood counts including your hemoglobin returned normal for you.           Bottom line:  Labs look OK.  Watch the saturated fats such as fried food, beet and pork to improve LDL cholesterol.  Eating more fiber like fruits and veggies can improve LDL cholesterol.  We should recheck in one year.  Return in 6-8 weeks to recheck the thyroid test.        Follow up:   Lab appointment in 6-8 weeks for thyroid test.      Sincerely,    Dr. Mccabe

## 2019-10-21 NOTE — TELEPHONE ENCOUNTER
Talked to patient to schedule new eval, will call back at another time.      Lois Ny    [facility]

## 2019-10-28 NOTE — TELEPHONE ENCOUNTER
Pain Management Center Referral      1. Confirmed address with patient? Yes  2. Confirmed phone number with patient? Yes  3. Confirmed referring provider? Yes  4. Is the PCP the same as the referring provider? Yes  5. Has the patient been to any previous pain clinics? No  (If yes, send JEFFREY with welcome letter)  6. Which insurance are we to bill for this appointment?  BCBS    7. Informed pt of cancellation (48 hour) policy? Yes    REGARDING OPIOID MEDICATIONS: We will always address appropriateness of opioid pain medications, but we generally will not automatically take on a prescribing role. When we do take on prescribing of opioids for chronic pain, it is in collaboration with the referring physician for an intermediate period of time (months), with an expectation that the primary physician or provider will assume the prescribing role if medications are effective at stable doses with demonstrated compliance. Therefore, please do not assume that your prescribing responsibilities end on the day of pain clinic consultation.  8. Informed pt of prescribing policy? Yes    9.Please be aware that once you are established with a pain provider and location, you will need to continue have all future visits with that provider and location. It is best to determine what location is the most convenient for you and schedule with that one.    ** PATIENT INFORMED OF THIS POLICY Yes      9. Referring Provider: Yosef Mccabe    [facility]

## 2019-12-02 ENCOUNTER — OFFICE VISIT (OUTPATIENT)
Dept: PALLIATIVE MEDICINE | Facility: CLINIC | Age: 69
End: 2019-12-02
Payer: COMMERCIAL

## 2019-12-02 VITALS
WEIGHT: 136 LBS | BODY MASS INDEX: 28.23 KG/M2 | DIASTOLIC BLOOD PRESSURE: 74 MMHG | HEART RATE: 65 BPM | OXYGEN SATURATION: 98 % | SYSTOLIC BLOOD PRESSURE: 146 MMHG

## 2019-12-02 DIAGNOSIS — M79.18 MYOFASCIAL MUSCLE PAIN: Primary | ICD-10-CM

## 2019-12-02 DIAGNOSIS — M79.2 THORACIC NEURALGIA: ICD-10-CM

## 2019-12-02 PROCEDURE — 99204 OFFICE O/P NEW MOD 45 MIN: CPT | Performed by: PAIN MEDICINE

## 2019-12-02 RX ORDER — CYCLOBENZAPRINE HCL 10 MG
5-10 TABLET ORAL 2 TIMES DAILY PRN
Qty: 45 TABLET | Refills: 1 | Status: SHIPPED | OUTPATIENT
Start: 2019-12-02 | End: 2019-12-11

## 2019-12-02 ASSESSMENT — PAIN SCALES - GENERAL: PAINLEVEL: NO PAIN (0)

## 2019-12-02 NOTE — PROGRESS NOTES
[facility] Consultation    Date of visit: 12/2/2019    Reason for consultation:    Primary Care Provider is Yosef Mccabe  Pain medications are being prescribed by n/a.    Please see the [facility] health questionnaire which the patient completed and reviewed with me in detail.    Chief Complaint:    Chief Complaint   Patient presents with     Pain     MME prescribed prior to seeing patient:  Current MME:    Pain history: Keyonna De La Garza is a 69 year old female hx of scoliosis and gerd  who first started having problems with pain greater than 1 year ago.  Patient was seen by neurosurgery at that time.  The patient's pain gradually improved with physical therapy and did not require any surgical intervention.  Thoracic epidural was recommended if the pain returned.  Of note there was possible concern for infection on the MRI.  All other lab work-up was negative.  As a result this was felt to be secondary to severe degenerative disease.    Currently the pt feels reasonably well. The pt feels she hasn't had a flare in >1month     The pt currently has intermittent symptoms. When she has symptoms the pain is so severe that she cant eat.The pt reports when she has the symptoms the pain is sharp shooting. The pain is constant. The pain radiates all the way to the front. The pain is extremely debilitating. She denies numbness. The pt reports + tingling. The pt reports + burning. The pain is worse with sneezing at times. The pain is worse with getting in and out of bed. The pain is worse at the end of the day. The pt reports the pain was worse in June. The pt feels the pain is neg affecting by weather.  The pt reports benefit with change in weather. THe pt feels some benefit with rest.The pt reports takes acetaminophen when she has a flare up with mild benefit. She did take take some asa with benefit. The pt is nervous to take NSAIDS 2/2 to Gerd. The pt has noticed sig benefit  with movement. The pt reports benefit with PHYSICAL THERAPY.     Of note the pt reports the pain sig affects her sleep.     Of note the pt shoveled snow yesterday without issue.         Pain rating: intensity  Averages 2/10 on a 0-10 scale.      Current treatments include:  Zantac-gerd  Acetaminophen as needed   On rare occasions aspirin   Topical lidocaine  previous medication treatments included:  n/a    Other treatments have included:  Keyonna De La Garza has not been seen at a pain clinic in the past.    PT: y  Chiropractic: y  Acupuncture: n  TENs Unit: n  Injections: n    Past Medical History:  Past Medical History:   Diagnosis Date     Abdominal pain, other specified site 2013    RUQ     Gallstones 2013     GERD (gastroesophageal reflux disease)      Hypothyroidism      Past Surgical History:  Past Surgical History:   Procedure Laterality Date     COLONOSCOPY       LAPAROSCOPIC CHOLECYSTECTOMY  4/24/2013    Procedure: LAPAROSCOPIC CHOLECYSTECTOMY;  Laparoscopic cholecystectomy.;  Surgeon: Bong Pyle MD;  Location:  OR     Medications:  Current Outpatient Medications   Medication Sig Dispense Refill     acetaminophen (TYLENOL) 500 MG tablet Take 1,000 mg by mouth every 8 hours as needed for mild pain       cyclobenzaprine (FLEXERIL) 10 MG tablet Take 0.5-1 tablets (5-10 mg) by mouth 2 times daily as needed for muscle spasms 45 tablet 1     levothyroxine (SYNTHROID/LEVOTHROID) 100 MCG tablet Take 1 tablet (100 mcg) by mouth daily Must be seen by new primary provider in planned October visit for additional refills 90 tablet 0     ranitidine (ZANTAC) 150 MG tablet Take 1 tablet (150 mg) by mouth daily       Allergies:     Allergies   Allergen Reactions     Darvon [Aspirin] GI Disturbance     Social History:  Home situation: [facility]  Occupation/Schooling: y  Tobacco use: n  Alcohol use: n  Drug use: n  History of chemical dependency treatment: n    Family history:  Family History   Problem Relation Age of  Onset     C.A.D. Mother      Cerebrovascular Disease Father      Family history of headaches: n    Review of Systems:  Skin: negative  Eyes: negative  Ears/Nose/Throat: negative  Respiratory: No shortness of breath, dyspnea on exertion, cough, or hemoptysis  Cardiovascular: negative  Gastrointestinal: negative  Genitourinary: negative  Musculoskeletal: negative  Neurologic: negative  Psychiatric: negative  Hematologic/Lymphatic/Immunologic: negative  Endocrine: negative    Physical Exam:  Vitals:    12/02/19 1005   BP: (!) 146/74   Pulse: 65   SpO2: 98%   Weight: 61.7 kg (136 lb)     Exam:  Constitutional: healthy, alert and no distress  Head: normocephalic. Atraumatic.   Eyes: no redness or jaundice noted   ENT: oropharnx normal.  MMM.  Neck supple.    Cardiovascular: Negative JVD  Respiratory: Speaking in full sentences no accessory muscles use   gastrointestinal: soft, non-tender,   Skin: no suspicious lesions or rashes  Psychiatric: mentation appears normal and affect normal/bright    Musculoskeletal exam:  Gait/Station/Posture: wnl  Cervical spine: ROMwnl       Thoracic spine: Tenderness to palpation bilaterally inferior to the scapula approximately T8-T10 region.  Mild tenderness to palpation over spinous process.  Negative changes in sensation negative allodynia.    Neurologic exam:  CN:  Cranial nerves 2-12 are normal  Motor:  5/5 UE   Reflexes:     Biceps:     +2   Brachioradialis   +2     Triceps:  +2    Sensory:  (upper and lower extremities):   Light touch: normal    Allodynia: absent    Dysethesia: absent    Hyperalgesia: absent     Diagnostic tests:    2018  \FINDINGS: The thoracic spinal cord and thecal sac appear normal.   Annular bulges are present in several thoracic levels including T5-T6,  T7-T8, and T10-T11. Spinal canal appears adequate.  All the neural  foramina and facet joints appear normal.  The paraspinous soft tissues  appear normal.  No fractures are present.  There is advanced  signal  abnormality in the T7 and T8 vertebral bodies but with only minimal  disc space narrowing and no evidence for high T2 signal within the  disc space. There is some minimal high T2 signal in the left  paraspinous soft tissues which can be a trace of fluid. This is best  seen on the left sagittal STIR series 601 image 10. Signal  characteristics consist of diminished T1 signal and mixed peripheral  high T2 with more central low T2 signal adjacent to the disc spaces  involving almost entire T7 and T8 vertebrae. Because of the relative  preservation of disc space and lack of T2 signal in the disc space, I  favor this representing an exuberant degenerative reaction.  Unfortunately, osteomyelitis cannot be entirely excluded. Gadolinium  enhancement would likely not be helpful as either process could  enhance slightly. There is very little if any tissue in the disc space  or paraspinous region for meaningful aspiration. Clinical parameters  such as sedimentation rate and presence of fever can be helpful in  this setting. Remainder of the scan is normal.                                                                      IMPRESSION: Abnormality of the T7 and T8 vertebrae with relative  sparing of the T7-T8 disc space as described above. Exuberant  degenerative disease is most likely but infection cannot be entirely  excluded. Please see description above. Interdepartmental consultation  was obtained from a musculoskeletal radiologist who concurs.        Assessment/Plan:  Keyonna De La Garza is a 69 year old female who presents with the complaints of Bilateral thoracic pain radiating to her anterior chest.  Keyonna was seen today for pain.    Diagnoses and all orders for this visit:    Myofascial muscle pain  -     cyclobenzaprine (FLEXERIL) 10 MG tablet; Take 0.5-1 tablets (5-10 mg) by mouth 2 times daily as needed for muscle spasms    Thoracic neuralgia  -     cyclobenzaprine (FLEXERIL) 10 MG tablet; Take 0.5-1 tablets  (5-10 mg) by mouth 2 times daily as needed for muscle spasms         - Further procedures recommended:    - consider thoracic epidural if symptoms return   - Medication Management:    - reasonable to continue as needed acetaminophen would not recommend more than 3grams(3000mg) in a day     - For acute flares would consider a short course of a NSAID   -ordered  low dose of flexeril 5-10mg twice a day as needed when having a flare. Would start with PM dose for at least 3 days before adding am dose as tolerated.   - Would hold off on a daily medication at this time. If pain become more persistent would start a low dose gabapentin(100mg three times a day)  - Physical Therapy: continue home regimen  - Clinical Health Psychologist to address issues of relaxation, behavioral change, coping style, and other factors important to improvement: consider in the future  - Diagnostic Studies: If symptoms progress and become constant, I would recommend a repeat MRI   - Follow up: as needed   [email or web address]        Total time spent was 60 minutes, and more than 50% of face to face time was spent counseling and/or coordination of care regarding principles of multidisciplinary care and medication management Bilateral thoracic pain radiating to her anterior chest.    Rj Soto MD  [facility]  This note was created with voice recognition software, and while reviewed for accuracy, typos may remain.

## 2019-12-02 NOTE — Clinical Note
Thank you for the referral.  Patient essentially in no pain today.  Discussed that in the future she could come directly to me now that we have establish care.

## 2019-12-02 NOTE — PATIENT INSTRUCTIONS
- Further procedures recommended:    - consider thoracic epidural if symptoms return   - Medication Management:    - reasonable to continue as needed acetaminophen would not recommend more than 3grams(3000mg) in a day     - For acute flares would consider a short course of a NSAID   -ordered  low dose of flexeril 5-10mg twice a day as needed when having a flare. Would start with PM dose for at least 3 days before adding am dose as tolerated.   - Would hold off on a daily medication at this time. If pain become more persistent would start a low dose gabapentin(100mg three times a day)  - Physical Therapy: continue home regimen  - Clinical Health Psychologist to address issues of relaxation, behavioral change, coping style, and other factors important to improvement: consider in the futur  - Diagnostic Studies: If symptoms progress and become constant, I would recommend a repeat MRI   - Follow up: as needed   [email or web address]      ----------------------------------------------------------------  Clinic Number:  [phone]     Call with any questions about your care and for scheduling assistance.     Calls are returned Monday through Friday between 8 AM and 4:30 PM. We usually get back to you within 2 business days depending on the issue/request.    If we are prescribing your medications:    For opioid medication refills, call the clinic or send a [employer] message 7 days in advance.  Please include:    Name of requested medication    Name of the pharmacy.    For non-opioid medications, call your pharmacy directly to request a refill. Please allow 3-4 days to be processed.     Per [place] State Law:    All controlled substance prescriptions must be filled within 30 days of being written.      For those controlled substances allowing refills, pickup must occur within 30 days of last fill.      We believe regular attendance is key to  your success in our program!      Any time you are unable to keep your appointment we ask that you call us at least 24 hours in advance to cancel.This will allow us to offer the appointment time to another patient.     Multiple missed appointments may lead to dismissal from the clinic.

## 2019-12-11 RX ORDER — CYCLOBENZAPRINE HCL 10 MG
5-10 TABLET ORAL 2 TIMES DAILY PRN
Qty: 60 TABLET | Refills: 1 | Status: SHIPPED | OUTPATIENT
Start: 2019-12-11 | End: 2020-10-19

## 2019-12-16 DIAGNOSIS — E03.9 HYPOTHYROIDISM, UNSPECIFIED TYPE: ICD-10-CM

## 2019-12-16 LAB — TSH SERPL DL<=0.005 MIU/L-ACNC: 1.23 MU/L (ref 0.4–4)

## 2019-12-16 PROCEDURE — 84443 ASSAY THYROID STIM HORMONE: CPT | Performed by: INTERNAL MEDICINE

## 2019-12-16 PROCEDURE — 36415 COLL VENOUS BLD VENIPUNCTURE: CPT | Performed by: INTERNAL MEDICINE

## 2019-12-16 NOTE — LETTER
[place] [address] [facility] [address] [facility]  [address]  [place]  [address]  Tel: [phone]    December 17, 2019    Keyonna De La Garza  [address] [facility] [address]        Dear MsEmeterio Frederic,    The following letter pertains to your most recent diagnostic tests:    -TSH (thyroid stimulating hormone) level is normal which indicates normal circulating thyroid hormone levels.          Bottom line:  Keep taking your levothyroxine as you are.        Sincerely,    Dr. Mccabe/YAMEL        Enclosure: Lab Results  Results for orders placed or performed in visit on 12/16/19   **TSH with free T4 reflex FUTURE 2mo     Status: None   Result Value Ref Range    TSH 1.23 0.40 - 4.00 mU/L

## 2019-12-17 NOTE — RESULT ENCOUNTER NOTE
The following letter pertains to your most recent diagnostic tests:    -TSH (thyroid stimulating hormone) level is normal which indicates normal circulating thyroid hormone levels.          Bottom line:  Keep taking your levothyroxine as you are.        Sincerely,    Dr. Mccabe

## 2020-01-04 DIAGNOSIS — E03.9 HYPOTHYROIDISM, UNSPECIFIED TYPE: ICD-10-CM

## 2020-01-04 NOTE — TELEPHONE ENCOUNTER
"Last Written Prescription Date:  10/07/19  Last Fill Quantity: 90 tablet,  # refills: 0   Last office visit: 10/14/2019 with prescribing provider:  Eliot   Future Office Visit:      Requested Prescriptions   Pending Prescriptions Disp Refills     levothyroxine (SYNTHROID/LEVOTHROID) 100 MCG tablet [Pharmacy Med Name: Levothyroxine Sodium Oral Tablet 100 MCG] 90 tablet 0     Sig: Take 1 tablet (100 mcg) by mouth daily. Must be seen by new primary provider in planned October visit for additional refills       Thyroid Protocol Passed - 1/4/2020  9:14 AM        Passed - Patient is 12 years or older        Passed - Recent (12 mo) or future (30 days) visit within the authorizing provider's specialty     Patient has had an office visit with the authorizing provider or a provider within the authorizing providers department within the previous 12 mos or has a future within next 30 days. See \"Patient Info\" tab in inbasket, or \"Choose Columns\" in Meds & Orders section of the refill encounter.              Passed - Medication is active on med list        Passed - Normal TSH on file in past 12 months     Recent Labs   Lab Test 12/16/19  0810   TSH 1.23              Passed - No active pregnancy on record     If patient is pregnant or has had a positive pregnancy test, please check TSH.          Passed - No positive pregnancy test in past 12 months     If patient is pregnant or has had a positive pregnancy test, please check TSH.            "

## 2020-01-06 RX ORDER — LEVOTHYROXINE SODIUM 100 UG/1
100 TABLET ORAL DAILY
Qty: 90 TABLET | Refills: 2 | Status: SHIPPED | OUTPATIENT
Start: 2020-01-06 | End: 2020-09-22

## 2020-01-06 NOTE — TELEPHONE ENCOUNTER
Prescription approved per [facility] Refill Protocol.  Sujata MAYNARD RN

## 2020-08-20 ENCOUNTER — HOSPITAL ENCOUNTER (OUTPATIENT)
Dept: MAMMOGRAPHY | Facility: CLINIC | Age: 70
Discharge: HOME | End: 2020-08-20
Attending: INTERNAL MEDICINE | Admitting: INTERNAL MEDICINE
Payer: COMMERCIAL

## 2020-08-20 DIAGNOSIS — Z12.31 VISIT FOR SCREENING MAMMOGRAM: ICD-10-CM

## 2020-08-20 PROCEDURE — 77067 SCR MAMMO BI INCL CAD: CPT

## 2020-10-19 ENCOUNTER — OFFICE VISIT (OUTPATIENT)
Dept: FAMILY MEDICINE | Facility: CLINIC | Age: 70
End: 2020-10-19
Payer: COMMERCIAL

## 2020-10-19 VITALS
TEMPERATURE: 97.3 F | SYSTOLIC BLOOD PRESSURE: 138 MMHG | WEIGHT: 139.9 LBS | OXYGEN SATURATION: 98 % | DIASTOLIC BLOOD PRESSURE: 72 MMHG | BODY MASS INDEX: 29.37 KG/M2 | HEART RATE: 70 BPM | HEIGHT: 58 IN

## 2020-10-19 DIAGNOSIS — G89.29 CHRONIC LOW BACK PAIN WITHOUT SCIATICA, UNSPECIFIED BACK PAIN LATERALITY: ICD-10-CM

## 2020-10-19 DIAGNOSIS — E03.9 HYPOTHYROIDISM, UNSPECIFIED TYPE: ICD-10-CM

## 2020-10-19 DIAGNOSIS — M54.50 CHRONIC LOW BACK PAIN WITHOUT SCIATICA, UNSPECIFIED BACK PAIN LATERALITY: ICD-10-CM

## 2020-10-19 DIAGNOSIS — Z00.00 ROUTINE GENERAL MEDICAL EXAMINATION AT A HEALTH CARE FACILITY: Primary | ICD-10-CM

## 2020-10-19 DIAGNOSIS — M25.571 PAIN IN JOINT INVOLVING ANKLE AND FOOT, RIGHT: ICD-10-CM

## 2020-10-19 LAB
ALBUMIN SERPL-MCNC: 3.7 G/DL (ref 3.4–5)
ALP SERPL-CCNC: 76 U/L (ref 40–150)
ALT SERPL W P-5'-P-CCNC: 16 U/L (ref 0–50)
ANION GAP SERPL CALCULATED.3IONS-SCNC: 8 MMOL/L (ref 3–14)
AST SERPL W P-5'-P-CCNC: 10 U/L (ref 0–45)
BILIRUB SERPL-MCNC: 0.3 MG/DL (ref 0.2–1.3)
BUN SERPL-MCNC: 9 MG/DL (ref 7–30)
CALCIUM SERPL-MCNC: 9.6 MG/DL (ref 8.5–10.1)
CHLORIDE SERPL-SCNC: 110 MMOL/L (ref 94–109)
CHOLEST SERPL-MCNC: 168 MG/DL
CO2 SERPL-SCNC: 22 MMOL/L (ref 20–32)
CREAT SERPL-MCNC: 0.6 MG/DL (ref 0.52–1.04)
CRP SERPL-MCNC: 5.6 MG/L (ref 0–8)
ERYTHROCYTE [DISTWIDTH] IN BLOOD BY AUTOMATED COUNT: 13.3 % (ref 10–15)
ERYTHROCYTE [SEDIMENTATION RATE] IN BLOOD BY WESTERGREN METHOD: 22 MM/H (ref 0–30)
GFR SERPL CREATININE-BSD FRML MDRD: >90 ML/MIN/{1.73_M2}
GLUCOSE SERPL-MCNC: 87 MG/DL (ref 70–99)
HCT VFR BLD AUTO: 41.9 % (ref 35–47)
HDLC SERPL-MCNC: 75 MG/DL
HGB BLD-MCNC: 13.6 G/DL (ref 11.7–15.7)
LDLC SERPL CALC-MCNC: 80 MG/DL
MCH RBC QN AUTO: 29.4 PG (ref 26.5–33)
MCHC RBC AUTO-ENTMCNC: 32.5 G/DL (ref 31.5–36.5)
MCV RBC AUTO: 91 FL (ref 78–100)
NONHDLC SERPL-MCNC: 93 MG/DL
PLATELET # BLD AUTO: 419 10E9/L (ref 150–450)
POTASSIUM SERPL-SCNC: 4.2 MMOL/L (ref 3.4–5.3)
PROT SERPL-MCNC: 8.1 G/DL (ref 6.8–8.8)
RBC # BLD AUTO: 4.63 10E12/L (ref 3.8–5.2)
SODIUM SERPL-SCNC: 140 MMOL/L (ref 133–144)
TRIGL SERPL-MCNC: 66 MG/DL
TSH SERPL DL<=0.005 MIU/L-ACNC: 0.58 MU/L (ref 0.4–4)
URATE SERPL-MCNC: 3.2 MG/DL (ref 2.6–6)
WBC # BLD AUTO: 9 10E9/L (ref 4–11)

## 2020-10-19 PROCEDURE — 99397 PER PM REEVAL EST PAT 65+ YR: CPT | Performed by: INTERNAL MEDICINE

## 2020-10-19 PROCEDURE — 84550 ASSAY OF BLOOD/URIC ACID: CPT | Performed by: INTERNAL MEDICINE

## 2020-10-19 PROCEDURE — 99213 OFFICE O/P EST LOW 20 MIN: CPT | Mod: 25 | Performed by: INTERNAL MEDICINE

## 2020-10-19 PROCEDURE — 85652 RBC SED RATE AUTOMATED: CPT | Performed by: INTERNAL MEDICINE

## 2020-10-19 PROCEDURE — 80061 LIPID PANEL: CPT | Performed by: INTERNAL MEDICINE

## 2020-10-19 PROCEDURE — 86618 LYME DISEASE ANTIBODY: CPT | Performed by: INTERNAL MEDICINE

## 2020-10-19 PROCEDURE — 85027 COMPLETE CBC AUTOMATED: CPT | Performed by: INTERNAL MEDICINE

## 2020-10-19 PROCEDURE — 86200 CCP ANTIBODY: CPT | Performed by: INTERNAL MEDICINE

## 2020-10-19 PROCEDURE — 80053 COMPREHEN METABOLIC PANEL: CPT | Performed by: INTERNAL MEDICINE

## 2020-10-19 PROCEDURE — 84443 ASSAY THYROID STIM HORMONE: CPT | Performed by: INTERNAL MEDICINE

## 2020-10-19 PROCEDURE — 86140 C-REACTIVE PROTEIN: CPT | Performed by: INTERNAL MEDICINE

## 2020-10-19 PROCEDURE — 36415 COLL VENOUS BLD VENIPUNCTURE: CPT | Performed by: INTERNAL MEDICINE

## 2020-10-19 ASSESSMENT — ACTIVITIES OF DAILY LIVING (ADL): CURRENT_FUNCTION: NO ASSISTANCE NEEDED

## 2020-10-19 ASSESSMENT — MIFFLIN-ST. JEOR: SCORE: 1056.08

## 2020-10-19 NOTE — LETTER
October 20, 2020      Keyonna De La Garza  [address] [facility] [address]        Dear ,    We are writing to inform you of your test results.    The following letter pertains to your most recent diagnostic tests:     -Lyme testing is negative.       -Tests for rheumatoid arthritis CCP Ab returned negative.       -Your ESR test (Sed Rate) and CRP returned normal.  These are a tests of body inflammation.       -TSH (thyroid stimulating hormone) level is normal which indicates normal circulating thyroid hormone levels.       -Liver and gallbladder tests are normal for you. (ALT,AST, Alk phos, bilirubin), kidney function is normal for you (Creatinine, GFR), Sodium is normal, Potassium is normal for you, Calcium is normal for you, Glucose (blood sugar) is normal for you.       -Gout is caused by URIC ACID crystals.  Having a blood uric acid level less than 6 optimally prevents gout attacks.  You uric acid level is:   Uric Acid  3.2  This makes it less likely that gout is causing your ankle pain.     -Your cholesterol panel looks healthy.       -Your complete blood counts including your hemoglobin returned normal for you.       Bottom line:  Lab test results look healthy and do not point to a specific cause for your sore ankle.  I recommend consulting with Dr. Mcdaniel from podiatry to evaluate further.         Follow up:  Schedule an appointment for a physical examination with fasting blood tests in one year's time, or return sooner if new questions, symptoms or problems arise.     Resulted Orders   Comprehensive metabolic panel   Result Value Ref Range    Sodium 140 133 - 144 mmol/L    Potassium 4.2 3.4 - 5.3 mmol/L    Chloride 110 (H) 94 - 109 mmol/L    Carbon Dioxide 22 20 - 32 mmol/L    Anion Gap 8 3 - 14 mmol/L    Glucose 87 70 - 99 mg/dL      Comment:      Fasting specimen    Urea Nitrogen 9 7 - 30 mg/dL    Creatinine 0.60 0.52 - 1.04 mg/dL    GFR Estimate >90 >60 mL/min/[1.73_m2]      Comment:       Non  GFR Calc  Starting 12/18/2018, serum creatinine based estimated GFR (eGFR) will be   calculated using the Chronic Kidney Disease Epidemiology Collaboration   (CKD-EPI) equation.      GFR Estimate If Black >90 >60 mL/min/[1.73_m2]      Comment:       GFR Calc  Starting 12/18/2018, serum creatinine based estimated GFR (eGFR) will be   calculated using the Chronic Kidney Disease Epidemiology Collaboration   (CKD-EPI) equation.      Calcium 9.6 8.5 - 10.1 mg/dL    Bilirubin Total 0.3 0.2 - 1.3 mg/dL    Albumin 3.7 3.4 - 5.0 g/dL    Protein Total 8.1 6.8 - 8.8 g/dL    Alkaline Phosphatase 76 40 - 150 U/L    ALT 16 0 - 50 U/L    AST 10 0 - 45 U/L   CBC with platelets   Result Value Ref Range    WBC 9.0 4.0 - 11.0 10e9/L    RBC Count 4.63 3.8 - 5.2 10e12/L    Hemoglobin 13.6 11.7 - 15.7 g/dL    Hematocrit 41.9 35.0 - 47.0 %    MCV 91 78 - 100 fl    MCH 29.4 26.5 - 33.0 pg    MCHC 32.5 31.5 - 36.5 g/dL    RDW 13.3 10.0 - 15.0 %    Platelet Count 419 150 - 450 10e9/L   TSH with free T4 reflex   Result Value Ref Range    TSH 0.58 0.40 - 4.00 mU/L   Lipid panel reflex to direct LDL Fasting   Result Value Ref Range    Cholesterol 168 <200 mg/dL    Triglycerides 66 <150 mg/dL      Comment:      Fasting specimen    HDL Cholesterol 75 >49 mg/dL    LDL Cholesterol Calculated 80 <100 mg/dL      Comment:      Desirable:       <100 mg/dl    Non HDL Cholesterol 93 <130 mg/dL   Lyme Disease Marti with reflex to WB Serum   Result Value Ref Range    Lyme Disease Antibodies Serum 0.15 0.00 - 0.89      Comment:      Negative, Absence of detectable Borrelia burdorferi antibodies. A negative   result does not exclude the possibility of Borrelia burgdorferi infection. If   early Lyme disease is suspected, a second sample should be collected and   tested 2 to 4 weeks later.     Erythrocyte sedimentation rate auto   Result Value Ref Range    Sed Rate 22 0 - 30 mm/h   CRP, inflammation   Result Value Ref  Range    CRP Inflammation 5.6 0.0 - 8.0 mg/L   Uric acid   Result Value Ref Range    Uric Acid 3.2 2.6 - 6.0 mg/dL   Cyclic Citrullinated Peptide Antibody IgG   Result Value Ref Range    Cyclic Citrullinated Peptide Antibody, IgG 1 <7 U/mL      Comment:      Negative       If you have any questions or concerns, please call the clinic at the number listed above.       Sincerely,        Yosef Mccabe MD

## 2020-10-19 NOTE — PROGRESS NOTES
"SUBJECTIVE:   Keyonna De La Garza is a 70 year old female who presents for Preventive Visit.      Patient has been advised of split billing requirements and indicates understanding: Yes   Are you in the first 12 months of your Medicare coverage?  No    Healthy Habits:     In general, how would you rate your overall health?  Good    Frequency of exercise:  None    Duration of exercise:  Other    Do you usually eat at least 4 servings of fruit and vegetables a day, include whole grains    & fiber and avoid regularly eating high fat or \"junk\" foods?  Yes    Taking medications regularly:  Yes    Barriers to taking medications:  None    Medication side effects:  None    Ability to successfully perform activities of daily living:  No assistance needed    Home Safety:  No safety concerns identified    Hearing Impairment:  Difficulty following a conversation in a noisy restaurant or crowded room, feel that people are mumbling or not speaking clearly, difficulty following dialogue in the theater, difficult to understand a speaker at a public meeting or Hindu service, need to ask people to speak up or repeat themselves, difficulty understanding soft or whispered speech and difficulty understanding speech on the telephone    In the past 6 months, have you been bothered by leaking of urine?  No    In general, how would you rate your overall mental or emotional health?  Good      PHQ-2 Total Score: 0    Additional concerns today:  Yes (sore ankle (right) )    Sudden onset of moderate right ankle pain, redness and swelling in August without improvement  Ice does not help  No antecedent injury or trauma       Do you feel safe in your environment? Yes    Have you ever done Advance Care Planning? (For example, a Health Directive, POLST, or a discussion with a medical provider or your loved ones about your wishes): Yes, advance care planning is on file.      Fall risk  Fallen 2 or more times in the past year?: No  Any fall with " injury in the past year?: No    Cognitive Screening   1) Repeat 3 items (Leader, Season, Table)    2) Clock draw: NORMAL  3) 3 item recall: Recalls 3 objects  Results: 3 items recalled: COGNITIVE IMPAIRMENT LESS LIKELY    Mini-CogTM Copyright GUIDO Ozuna. Licensed by the author for use in [facility]; reprinted with permission (micki@[facility]). All rights reserved.      Do you have sleep apnea, excessive snoring or daytime drowsiness?: no    Reviewed and updated as needed this visit by clinical staff  Tobacco  Allergies  Meds   Med Hx  Surg Hx  Fam Hx          Reviewed and updated as needed this visit by Provider  Tobacco     Med Hx  Surg Hx  Fam Hx         Social History     Tobacco Use     Smoking status: Never Smoker     Smokeless tobacco: Never Used   Substance Use Topics     Alcohol use: Yes     Alcohol/week: 0.0 standard drinks     Comment: 2 drinks per week     If you drink alcohol do you typically have >3 drinks per day or >7 drinks per week? No    Alcohol Use 10/19/2020   Prescreen: >3 drinks/day or >7 drinks/week? No         Current providers sharing in care for this patient include:   Patient Care Team:  Yosef Mccabe MD as PCP - General (Internal Medicine)  Yosef Mccabe MD as Assigned PCP    The following health maintenance items are reviewed in Epic and correct as of today:  Health Maintenance   Topic Date Due     FALL RISK ASSESSMENT  10/14/2020     TSH W/FREE T4 REFLEX  12/16/2020     MEDICARE ANNUAL WELLNESS VISIT  10/19/2021     DTAP/TDAP/TD IMMUNIZATION (2 - Td) 02/09/2022     MAMMO SCREENING  08/20/2022     LIPID  10/14/2024     ADVANCE CARE PLANNING  10/19/2025     COLORECTAL CANCER SCREENING  06/24/2029     DEXA  Completed     HEPATITIS C SCREENING  Completed     PHQ-2  Completed     INFLUENZA VACCINE  Completed     Pneumococcal Vaccine: 65+ Years  Completed     ZOSTER IMMUNIZATION  Completed     Pneumococcal Vaccine: Pediatrics (0 to 5 Years) and At-Risk Patients (6 to  "64 Years)  Aged Out     IPV IMMUNIZATION  Aged Out     MENINGITIS IMMUNIZATION  Aged Out     HEPATITIS B IMMUNIZATION  Aged Out     Patient Active Problem List   Diagnosis     Advanced directives, counseling/discussion     Hypothyroidism     CARDIOVASCULAR SCREENING; LDL GOAL LESS THAN 130     GERD (gastroesophageal reflux disease)     Osteopenia     Cholecystitis     Lumbago     Past Surgical History:   Procedure Laterality Date     COLONOSCOPY       LAPAROSCOPIC CHOLECYSTECTOMY  4/24/2013    Procedure: LAPAROSCOPIC CHOLECYSTECTOMY;  Laparoscopic cholecystectomy.;  Surgeon: Bong Pyle MD;  Location:  OR       Social History     Tobacco Use     Smoking status: Never Smoker     Smokeless tobacco: Never Used   Substance Use Topics     Alcohol use: Yes     Alcohol/week: 0.0 standard drinks     Comment: 2 drinks per week     Family History   Problem Relation Age of Onset     C.A.D. Mother      Cerebrovascular Disease Father          Current Outpatient Medications   Medication Sig Dispense Refill     acetaminophen (TYLENOL) 500 MG tablet Take 1,000 mg by mouth every 8 hours as needed for mild pain       Famotidine (PEPCID PO) Every morning       levothyroxine (SYNTHROID/LEVOTHROID) 100 MCG tablet Take 1 tablet (100 mcg) by mouth daily. Must be seen by new primary provider in planned October visit for additional refills 90 tablet 0     cyclobenzaprine (FLEXERIL) 10 MG tablet Take 0.5-1 tablets (5-10 mg) by mouth 2 times daily as needed for muscle spasms (Patient not taking: Reported on 10/19/2020) 60 tablet 1     Allergies   Allergen Reactions     Darvon [Aspirin] GI Disturbance         Review of Systems  Constitutional, HEENT, cardiovascular, pulmonary, gi and gu systems are negative, except as otherwise noted.    OBJECTIVE:   /72 (BP Location: Right arm, Patient Position: Sitting, Cuff Size: Adult Regular)   Pulse 70   Temp 97.3  F (36.3  C) (Temporal)   Ht 1.484 m (4' 10.43\")   Wt 63.5 kg (139 " "lb 14.4 oz)   SpO2 98%   Breastfeeding No   BMI 28.82 kg/m   Estimated body mass index is 28.82 kg/m  as calculated from the following:    Height as of this encounter: 1.484 m (4' 10.43\").    Weight as of this encounter: 63.5 kg (139 lb 14.4 oz).  Physical Exam  GENERAL APPEARANCE: healthy, alert and no distress  EYES: Eyes grossly normal to inspection, PERRL and conjunctivae and sclerae normal  HENT: ear canals and TM's normal, we did not remove mask due to COVID  NECK: no adenopathy, no asymmetry, masses, or scars and thyroid normal to palpation  RESP: lungs clear to auscultation - no rales, rhonchi or wheezes  BREAST: declined exam due to recent mammogram  CV: regular rate and rhythm, normal S1 S2, no S3 or S4, no murmur, click or rub, no peripheral edema and peripheral pulses strong  ABDOMEN: soft, nontender, no hepatosplenomegaly, no masses and bowel sounds normal  MS: area or ill defined erythema overlying medial malleolus right ankle, good ROM, obvious effusion and gait is age appropriate without ataxia  SKIN: no suspicious lesions or rashes  NEURO: Normal strength and tone, sensory exam grossly normal, mentation intact and speech normal  PSYCH: mentation appears normal and affect normal/bright    Diagnostic Test Results:    ASSESSMENT / PLAN:   1. Routine general medical examination at a health care facility    - Comprehensive metabolic panel  - CBC with platelets  - Lipid panel reflex to direct LDL Fasting    2. Chronic low back pain without sciatica, unspecified back pain laterality  We discussed adding duloxetine to APAP, she would consider; side effects and risks discussed     3. Hypothyroidism, unspecified type  Recheck TFTs  - TSH with free T4 reflex    4. Pain in joint involving ankle and foot, right  Not sure what is causing this consider inflammatory arthritis, Lyme, or gout; check below labs to investigate further  If they do not point to diagnosis then would recommend foot and ankle consult   - " "Lyme Disease Marti with reflex to WB Serum  - Erythrocyte sedimentation rate auto  - CRP, inflammation  - Uric acid  - Cyclic Citrullinated Peptide Antibody IgG  - Orthopedic & Spine  Referral; Future    Patient has been advised of split billing requirements and indicates understanding: Yes  COUNSELING:  Reviewed preventive health counseling, as reflected in patient instructions  Special attention given to:       Regular exercise       Healthy diet/nutrition       Immunizations    VACCINES ARE UP TO DATE           Consider lung cancer screening for ages 55-80 years and 30 pack-year smoking history ; Never smoker       Colon cancer screening; REPEAT 6/2029        She had a mammogram in 8/2020    Estimated body mass index is 28.82 kg/m  as calculated from the following:    Height as of this encounter: 1.484 m (4' 10.43\").    Weight as of this encounter: 63.5 kg (139 lb 14.4 oz).    Weight management plan: Discussed healthy diet and exercise guidelines    She reports that she has never smoked. She has never used smokeless tobacco.      Appropriate preventive services were discussed with this patient, including applicable screening as appropriate for cardiovascular disease, diabetes, osteopenia/osteoporosis, and glaucoma.  As appropriate for age/gender, discussed screening for colorectal cancer, prostate cancer, breast cancer, and cervical cancer. Checklist reviewing preventive services available has been given to the patient.    Reviewed patients plan of care and provided an AVS. The Basic Care Plan (routine screening as documented in Health Maintenance) for Keyonna meets the Care Plan requirement. This Care Plan has been established and reviewed with the Patient.    Counseling Resources:  ATP IV Guidelines  Pooled Cohorts Equation Calculator  Breast Cancer Risk Calculator  Breast Cancer: Medication to Reduce Risk  FRAX Risk Assessment  ICSI Preventive Guidelines  Dietary Guidelines for Americans, 2010  USDA's " MyPlate  ASA Prophylaxis  Lung CA Screening    Yosef Mccabe MD  [facility]    Identified Health Risks:

## 2020-10-20 LAB
B BURGDOR IGG+IGM SER QL: 0.15 (ref 0–0.89)
CCP AB SER IA-ACNC: 1 U/ML

## 2020-10-20 NOTE — RESULT ENCOUNTER NOTE
The following letter pertains to your most recent diagnostic tests:    -Lyme testing is negative.      -Tests for rheumatoid arthritis CCP Ab returned negative.      -Your ESR test (Sed Rate) and CRP returned normal.  These are a tests of body inflammation.       -TSH (thyroid stimulating hormone) level is normal which indicates normal circulating thyroid hormone levels.      -Liver and gallbladder tests are normal for you. (ALT,AST, Alk phos, bilirubin), kidney function is normal for you (Creatinine, GFR), Sodium is normal, Potassium is normal for you, Calcium is normal for you, Glucose (blood sugar) is normal for you.      -Gout is caused by URIC ACID crystals.  Having a blood uric acid level less than 6 optimally prevents gout attacks.  You uric acid level is:  Uric Acid  3.2  This makes it less likely that gout is causing your ankle pain.    -Your cholesterol panel looks healthy.     -Your complete blood counts including your hemoglobin returned normal for you.       Bottom line:  Lab test results look healthy and do not point to a specific cause for your sore ankle.  I recommend consulting with Dr. Mcdaniel from podiatry to evaluate further.         Follow up:  Schedule an appointment for a physical examination with fasting blood tests in one year's time, or return sooner if new questions, symptoms or problems arise.       Sincerely,    Dr. Mccabe

## 2020-11-05 ENCOUNTER — TELEPHONE (OUTPATIENT)
Dept: FAMILY MEDICINE | Facility: CLINIC | Age: 70
End: 2020-11-05

## 2020-11-05 NOTE — TELEPHONE ENCOUNTER
I called pt    Ankle issue ongoing since mid August.  Saw Ortho no gout, no fx, etc they said to see derm.      Derm in [facility] if possible.  Call pt with ph #

## 2020-11-05 NOTE — TELEPHONE ENCOUNTER
Reason for Call: Request for an order or referral:    Order or referral being requested: Dermatologist    Date needed: as soon as possible    Has the patient been seen by the PCP for this problem? YES    Additional comments: Keyonna Yoder calling in to get a referral to see Dermatologist. She already saw Ortho but they referred her back to us to get an order for dermatology.    Please place order and call her back.    Phone number Patient can be reached at:  Cell number on file:    Telephone Information:   Mobile [phone]       Best Time:  Any     Can we leave a detailed message on this number?  YES    Call taken on 11/5/2020 at 4:24 PM by Rony Holland

## 2020-11-06 NOTE — TELEPHONE ENCOUNTER
Reason for Call:  Other dermatology    Detailed comments: patient does not have an official referral to dermatology from Dr Mccabe but when she called Dr Rai's office they wanted office visit notes from Patient's 10/19/20 visit with Dr Mccabe faxed to their office prior to scheduling her.  Their fax # is [phone].      Phone Number Patient can be reached at: Home number on file [phone] (home)    Best Time: any    Can we leave a detailed message on this number? YES    Call taken on 11/6/2020 at 11:19 AM by Shira Lguo

## 2020-12-30 DIAGNOSIS — E03.9 HYPOTHYROIDISM, UNSPECIFIED TYPE: ICD-10-CM

## 2020-12-30 RX ORDER — LEVOTHYROXINE SODIUM 100 UG/1
100 TABLET ORAL DAILY
Qty: 90 TABLET | Refills: 3 | Status: SHIPPED | OUTPATIENT
Start: 2020-12-30 | End: 2021-12-22

## 2021-02-07 ENCOUNTER — IMMUNIZATION (OUTPATIENT)
Dept: LAB | Age: 71
End: 2021-02-07

## 2021-02-07 DIAGNOSIS — Z23 NEED FOR VACCINATION: Primary | ICD-10-CM

## 2021-02-07 PROCEDURE — 0001A COVID 19 PFIZER-BIONTECH: CPT

## 2021-02-07 PROCEDURE — 91300 COVID 19 PFIZER-BIONTECH: CPT

## 2021-03-02 ENCOUNTER — IMMUNIZATION (OUTPATIENT)
Dept: LAB | Age: 71
End: 2021-03-02
Attending: HOSPITALIST

## 2021-03-02 DIAGNOSIS — Z23 NEED FOR VACCINATION: Primary | ICD-10-CM

## 2021-03-02 PROCEDURE — 0002A COVID 19 PFIZER-BIONTECH: CPT | Performed by: NURSE PRACTITIONER

## 2021-03-02 PROCEDURE — 91300 COVID 19 PFIZER-BIONTECH: CPT | Performed by: NURSE PRACTITIONER

## 2021-08-23 ENCOUNTER — HOSPITAL ENCOUNTER (OUTPATIENT)
Dept: MAMMOGRAPHY | Facility: CLINIC | Age: 71
Discharge: HOME | End: 2021-08-23
Attending: INTERNAL MEDICINE | Admitting: INTERNAL MEDICINE
Payer: COMMERCIAL

## 2021-08-23 DIAGNOSIS — Z12.31 VISIT FOR SCREENING MAMMOGRAM: ICD-10-CM

## 2021-08-23 PROCEDURE — 77067 SCR MAMMO BI INCL CAD: CPT

## 2021-09-09 ENCOUNTER — HOSPITAL ENCOUNTER (OUTPATIENT)
Dept: MAMMOGRAPHY | Age: 71
Discharge: HOME | End: 2021-09-09

## 2021-09-09 DIAGNOSIS — Z12.31 ENCOUNTER FOR SCREENING MAMMOGRAM FOR MALIGNANT NEOPLASM OF BREAST: ICD-10-CM

## 2021-09-09 PROCEDURE — 77063 BREAST TOMOSYNTHESIS BI: CPT

## 2021-10-25 ENCOUNTER — OFFICE VISIT (OUTPATIENT)
Dept: FAMILY MEDICINE | Facility: CLINIC | Age: 71
End: 2021-10-25
Payer: COMMERCIAL

## 2021-10-25 VITALS
RESPIRATION RATE: 16 BRPM | OXYGEN SATURATION: 98 % | BODY MASS INDEX: 28.8 KG/M2 | HEIGHT: 58 IN | DIASTOLIC BLOOD PRESSURE: 66 MMHG | HEART RATE: 71 BPM | TEMPERATURE: 98.2 F | WEIGHT: 137.2 LBS | SYSTOLIC BLOOD PRESSURE: 124 MMHG

## 2021-10-25 DIAGNOSIS — R11.0 NAUSEA: ICD-10-CM

## 2021-10-25 DIAGNOSIS — N95.0 POST-MENOPAUSAL BLEEDING: ICD-10-CM

## 2021-10-25 DIAGNOSIS — E03.9 HYPOTHYROIDISM, UNSPECIFIED TYPE: ICD-10-CM

## 2021-10-25 DIAGNOSIS — Z23 HIGH PRIORITY FOR 2019-NCOV VACCINE: Primary | ICD-10-CM

## 2021-10-25 DIAGNOSIS — Z00.00 ROUTINE GENERAL MEDICAL EXAMINATION AT A HEALTH CARE FACILITY: ICD-10-CM

## 2021-10-25 DIAGNOSIS — Z23 NEED FOR PROPHYLACTIC VACCINATION AND INOCULATION AGAINST INFLUENZA: ICD-10-CM

## 2021-10-25 LAB
ERYTHROCYTE [DISTWIDTH] IN BLOOD BY AUTOMATED COUNT: 13.2 % (ref 10–15)
HCT VFR BLD AUTO: 42.6 % (ref 35–47)
HGB BLD-MCNC: 14 G/DL (ref 11.7–15.7)
MCH RBC QN AUTO: 30.2 PG (ref 26.5–33)
MCHC RBC AUTO-ENTMCNC: 32.9 G/DL (ref 31.5–36.5)
MCV RBC AUTO: 92 FL (ref 78–100)
PLATELET # BLD AUTO: 347 10E3/UL (ref 150–450)
RBC # BLD AUTO: 4.64 10E6/UL (ref 3.8–5.2)
WBC # BLD AUTO: 8.6 10E3/UL (ref 4–11)

## 2021-10-25 PROCEDURE — 90662 IIV NO PRSV INCREASED AG IM: CPT | Performed by: INTERNAL MEDICINE

## 2021-10-25 PROCEDURE — 85027 COMPLETE CBC AUTOMATED: CPT | Performed by: INTERNAL MEDICINE

## 2021-10-25 PROCEDURE — 0004A COVID-19,PF,PFIZER (12+ YRS): CPT | Performed by: INTERNAL MEDICINE

## 2021-10-25 PROCEDURE — 36415 COLL VENOUS BLD VENIPUNCTURE: CPT | Performed by: INTERNAL MEDICINE

## 2021-10-25 PROCEDURE — 99397 PER PM REEVAL EST PAT 65+ YR: CPT | Mod: 25 | Performed by: INTERNAL MEDICINE

## 2021-10-25 PROCEDURE — 99213 OFFICE O/P EST LOW 20 MIN: CPT | Mod: 25 | Performed by: INTERNAL MEDICINE

## 2021-10-25 PROCEDURE — 80053 COMPREHEN METABOLIC PANEL: CPT | Performed by: INTERNAL MEDICINE

## 2021-10-25 PROCEDURE — 84443 ASSAY THYROID STIM HORMONE: CPT | Performed by: INTERNAL MEDICINE

## 2021-10-25 PROCEDURE — 90471 IMMUNIZATION ADMIN: CPT | Performed by: INTERNAL MEDICINE

## 2021-10-25 PROCEDURE — 84439 ASSAY OF FREE THYROXINE: CPT | Performed by: INTERNAL MEDICINE

## 2021-10-25 PROCEDURE — 91300 COVID-19,PF,PFIZER (12+ YRS): CPT | Performed by: INTERNAL MEDICINE

## 2021-10-25 PROCEDURE — 80061 LIPID PANEL: CPT | Performed by: INTERNAL MEDICINE

## 2021-10-25 ASSESSMENT — ENCOUNTER SYMPTOMS
CHILLS: 0
HEMATURIA: 1
HEARTBURN: 0
CONSTIPATION: 0
PARESTHESIAS: 0
NERVOUS/ANXIOUS: 0
MYALGIAS: 0
WEAKNESS: 0
DIARRHEA: 0
EYE PAIN: 0
NAUSEA: 1
COUGH: 0
HEMATOCHEZIA: 0
ARTHRALGIAS: 0
ABDOMINAL PAIN: 1
FREQUENCY: 1
FEVER: 0
JOINT SWELLING: 0
BREAST MASS: 0
PALPITATIONS: 0
DIZZINESS: 0
DYSURIA: 0
HEADACHES: 1
SHORTNESS OF BREATH: 0
SORE THROAT: 0

## 2021-10-25 ASSESSMENT — ACTIVITIES OF DAILY LIVING (ADL): CURRENT_FUNCTION: NO ASSISTANCE NEEDED

## 2021-10-25 ASSESSMENT — MIFFLIN-ST. JEOR: SCORE: 1032.09

## 2021-10-25 NOTE — LETTER
[address] [facility]  [address]  [place]  [address]  Tel: [phone]    October 27, 2021    Keyonna De La Garza  [address] [facility] [address]        Dear Ms. De La Garza,    The following letter pertains to your most recent diagnostic tests:     The thyroid tests suggest your levothyroxine dose may need to be decreased, but before changing your dose, I recommend a follow up thyroid blood test in 6-8 weeks.       The cholesterol panel looks stable..     -Liver and gallbladder tests are normal for you. (ALT,AST, Alk phos, bilirubin), kidney function is normal for you (Creatinine, GFR), Sodium is normal, Potassium is normal for you, Calcium is normal for you, Glucose (blood sugar) is normal for you.       -Your complete blood counts including your hemoglobin returned normal for you.             Follow up:  schedule a lab appointment in 6-8 weeks to recheck the thyroid function tests.         Sincerely,     Dr. Mccabe/SML           Enclosure: Lab Results  Results for orders placed or performed in visit on 10/25/21   Lipid panel reflex to direct LDL Fasting     Status: Abnormal   Result Value Ref Range    Cholesterol 192 <200 mg/dL    Triglycerides 60 <150 mg/dL    Direct Measure HDL 70 >=50 mg/dL    LDL Cholesterol Calculated 110 (H) <=100 mg/dL    Non HDL Cholesterol 122 <130 mg/dL    Patient Fasting > 8hrs? Yes     Narrative    Cholesterol  Desirable:  <200 mg/dL    Triglycerides  Normal:  Less than 150 mg/dL  Borderline High:  150-199 mg/dL  High:  200-499 mg/dL  Very High:  Greater than or equal to 500 mg/dL    Direct Measure HDL  Female:  Greater than or equal to 50 mg/dL   Male:  Greater than or equal to 40 mg/dL    LDL Cholesterol  Desirable:  <100mg/dL  Above Desirable:  100-129 mg/dL   Borderline High:  130-159 mg/dL   High:  160-189 mg/dL   Very High:  >= 190 mg/dL    Non HDL Cholesterol  Desirable:  130 mg/dL  Above Desirable:  130-159 mg/dL  Borderline High:   160-189 mg/dL  High:  190-219 mg/dL  Very High:  Greater than or equal to 220 mg/dL   Comprehensive metabolic panel     Status: Normal   Result Value Ref Range    Sodium 139 133 - 144 mmol/L    Potassium 4.4 3.4 - 5.3 mmol/L    Chloride 108 94 - 109 mmol/L    Carbon Dioxide (CO2) 21 20 - 32 mmol/L    Anion Gap 10 3 - 14 mmol/L    Urea Nitrogen 8 7 - 30 mg/dL    Creatinine 0.70 0.52 - 1.04 mg/dL    Calcium 9.3 8.5 - 10.1 mg/dL    Glucose 89 70 - 99 mg/dL    Alkaline Phosphatase 81 40 - 150 U/L    AST 12 0 - 45 U/L    ALT 16 0 - 50 U/L    Protein Total 7.8 6.8 - 8.8 g/dL    Albumin 3.8 3.4 - 5.0 g/dL    Bilirubin Total 0.5 0.2 - 1.3 mg/dL    GFR Estimate 88 >60 mL/min/1.73m2   CBC with platelets     Status: Normal   Result Value Ref Range    WBC Count 8.6 4.0 - 11.0 10e3/uL    RBC Count 4.64 3.80 - 5.20 10e6/uL    Hemoglobin 14.0 11.7 - 15.7 g/dL    Hematocrit 42.6 35.0 - 47.0 %    MCV 92 78 - 100 fL    MCH 30.2 26.5 - 33.0 pg    MCHC 32.9 31.5 - 36.5 g/dL    RDW 13.2 10.0 - 15.0 %    Platelet Count 347 150 - 450 10e3/uL   TSH with free T4 reflex     Status: Abnormal   Result Value Ref Range    TSH 0.16 (L) 0.40 - 4.00 mU/L   T4 free     Status: Normal   Result Value Ref Range    Free T4 1.29 0.76 - 1.46 ng/dL

## 2021-10-25 NOTE — PROGRESS NOTES
"SUBJECTIVE:   Keyonna De La Garza is a 71 year old female who presents for Preventive Visit.      Patient has been advised of split billing requirements and indicates understanding: Yes   Are you in the first 12 months of your Medicare coverage?  No    Healthy Habits:     In general, how would you rate your overall health?  Good    Frequency of exercise:  None    Do you usually eat at least 4 servings of fruit and vegetables a day, include whole grains    & fiber and avoid regularly eating high fat or \"junk\" foods?  Yes    Taking medications regularly:  Yes    Medication side effects:  None    Ability to successfully perform activities of daily living:  No assistance needed    Home Safety:  No safety concerns identified    Hearing Impairment:  Feel that people are mumbling or not speaking clearly, difficult to understand a speaker at a public meeting or Confucianism service, need to ask people to speak up or repeat themselves, difficulty understanding soft or whispered speech and difficulty understanding speech on the telephone    In the past 6 months, have you been bothered by leaking of urine? Yes    In general, how would you rate your overall mental or emotional health?  Good      PHQ-2 Total Score: 1    Additional concerns today:  Yes    Episode of vaginal bleeding in June  Mild lower abdominal cramping intermittent since then, but no bleeding   Do you feel safe in your environment? Yes    Have you ever done Advance Care Planning? (For example, a Health Directive, POLST, or a discussion with a medical provider or your loved ones about your wishes): No, advance care planning information given to patient to review.  Patient plans to discuss their wishes with loved ones or provider.         Fall risk  Fallen 2 or more times in the past year?: No  Any fall with injury in the past year?: No    Cognitive Screening   1) Repeat 3 items (Leader, Season, Table)    2) Clock draw: NORMAL  3) 3 item recall: Recalls 3 " objects  Results: 3 items recalled: COGNITIVE IMPAIRMENT LESS LIKELY    Mini-CogTM Copyright GUIDO Ozuna. Licensed by the author for use in [facility]; reprinted with permission (soob@[facility]). All rights reserved.      Do you have sleep apnea, excessive snoring or daytime drowsiness?: no    Reviewed and updated as needed this visit by clinical staff  Tobacco  Allergies  Meds              Reviewed and updated as needed this visit by Provider                Social History     Tobacco Use     Smoking status: Never Smoker     Smokeless tobacco: Never Used   Substance Use Topics     Alcohol use: Yes     Alcohol/week: 0.0 standard drinks     Comment: 2 drinks per week     If you drink alcohol do you typically have >3 drinks per day or >7 drinks per week? No    Alcohol Use 10/25/2021   Prescreen: >3 drinks/day or >7 drinks/week? No   Prescreen: >3 drinks/day or >7 drinks/week? -               Current providers sharing in care for this patient include:   Patient Care Team:  Yosef Mccabe MD as PCP - General (Internal Medicine)  Yosef Mccabe MD as Assigned PCP    The following health maintenance items are reviewed in Epic and correct as of today:  Health Maintenance Due   Topic Date Due     INFLUENZA VACCINE (1) 09/01/2021     FALL RISK ASSESSMENT  10/19/2021     TSH W/FREE T4 REFLEX  10/19/2021     Patient Active Problem List   Diagnosis     Advanced directives, counseling/discussion     Hypothyroidism     CARDIOVASCULAR SCREENING; LDL GOAL LESS THAN 130     GERD (gastroesophageal reflux disease)     Osteopenia     Cholecystitis     Lumbago     Past Surgical History:   Procedure Laterality Date     COLONOSCOPY       LAPAROSCOPIC CHOLECYSTECTOMY  4/24/2013    Procedure: LAPAROSCOPIC CHOLECYSTECTOMY;  Laparoscopic cholecystectomy.;  Surgeon: Bong Pyle MD;  Location:  OR       Social History     Tobacco Use     Smoking status: Never Smoker     Smokeless tobacco: Never Used   Substance Use  Topics     Alcohol use: Yes     Alcohol/week: 0.0 standard drinks     Comment: 2 drinks per week     Family History   Problem Relation Age of Onset     C.A.D. Mother      Cerebrovascular Disease Father          Current Outpatient Medications   Medication Sig Dispense Refill     acetaminophen (TYLENOL) 500 MG tablet Take 1,000 mg by mouth every 8 hours as needed for mild pain       calcium-vitamin D 500-125 MG-UNIT TABS Take 1 tablet by mouth 2 times daily       Famotidine (PEPCID PO) Every morning       ferrous fumarate 65 mg, [place]. FE,-Vitamin C 125 mg (VITRON C)  MG TABS tablet Take 1 tablet by mouth daily       levothyroxine (SYNTHROID/LEVOTHROID) 100 MCG tablet Take 1 tablet (100 mcg) by mouth daily 90 tablet 3     Allergies   Allergen Reactions     Darvon [Aspirin] GI Disturbance       Review of Systems   Constitutional: Negative for chills and fever.   HENT: Positive for hearing loss. Negative for congestion, ear pain and sore throat.    Eyes: Negative for pain and visual disturbance.   Respiratory: Negative for cough and shortness of breath.    Cardiovascular: Negative for chest pain, palpitations and peripheral edema.   Gastrointestinal: Positive for abdominal pain and nausea. Negative for constipation, diarrhea, heartburn and hematochezia.   Breasts:  Negative for tenderness, breast mass and discharge.   Genitourinary: Positive for frequency, hematuria, pelvic pain, urgency, vaginal bleeding and vaginal discharge. Negative for dysuria.   Musculoskeletal: Negative for arthralgias, joint swelling and myalgias.   Skin: Negative for rash.   Neurological: Positive for headaches. Negative for dizziness, weakness and paresthesias.   Psychiatric/Behavioral: Negative for mood changes. The patient is not nervous/anxious.    chronic hearing loss  Mild abdominal cramps and nausea after meals for several months, no vomiting, no intentional weight loss   No blood in urine, but vaginal bleeding episode as above  "  Chronic headaches       OBJECTIVE:   /66 (BP Location: Left arm, Patient Position: Sitting, Cuff Size: Adult Large)   Pulse 71   Temp 98.2  F (36.8  C) (Oral)   Resp 16   Ht 1.473 m (4' 10\")   Wt 62.2 kg (137 lb 3.2 oz)   SpO2 98%   BMI 28.67 kg/m   Estimated body mass index is 28.67 kg/m  as calculated from the following:    Height as of this encounter: 1.473 m (4' 10\").    Weight as of this encounter: 62.2 kg (137 lb 3.2 oz).  Physical Exam  GENERAL APPEARANCE: healthy, alert and no distress  EYES: Eyes grossly normal to inspection, PERRL and conjunctivae and sclerae normal  HENT: ear canals and TM's normal  NECK: no adenopathy, no asymmetry, masses, or scars and thyroid normal to palpation  RESP: lungs clear to auscultation - no rales, rhonchi or wheezes  CV: regular rate and rhythm, normal S1 S2, no S3 or S4, no murmur, click or rub, no peripheral edema and peripheral pulses strong  ABDOMEN: soft, nontender, no hepatosplenomegaly, no masses and bowel sounds normal  MS: no musculoskeletal defects are noted and gait is age appropriate without ataxia  SKIN: no suspicious lesions or rashes  NEURO: Normal strength and tone, sensory exam grossly normal, mentation intact and speech normal  PSYCH: mentation appears normal and affect normal/bright    Labs pending     ASSESSMENT / PLAN:   (Z00.00) Routine general medical examination at a health care facility  (primary encounter diagnosis)  Comment:   Plan: Lipid panel reflex to direct LDL Fasting,         Comprehensive metabolic panel, CBC with         platelets            (E03.9) Hypothyroidism, unspecified type  Comment: recheck TFTs  Plan: TSH with free T4 reflex            (N95.0) Post-menopausal bleeding  Comment: Needs GYN consult for this, nulliparous, likely needs EMB and uterus ultrasound; referred to center for women; discussed with patient   Plan: Ob/Gyn Referral            (R11.0) Nausea  Comment: unclear etiology; check labs, trial of iron " "supplement, if no adequate improvement, next step would be EGD, discussed with patient    Patient has been advised of split billing requirements and indicates understanding: Yes  COUNSELING:  Reviewed preventive health counseling, as reflected in patient instructions  Special attention given to:       Regular exercise       Healthy diet/nutrition       Immunizations    COVID booster and flu shot today              Consider lung cancer screening for ages 55-80 years and 30 pack-year smoking history ; never smoker        Colon cancer screening; repeat 9/2029       Hepatitis C screening; done   Mammogram:  Done in August 2021    Estimated body mass index is 28.67 kg/m  as calculated from the following:    Height as of this encounter: 1.473 m (4' 10\").    Weight as of this encounter: 62.2 kg (137 lb 3.2 oz).    Weight management plan: Discussed healthy diet and exercise guidelines    She reports that she has never smoked. She has never used smokeless tobacco.      Appropriate preventive services were discussed with this patient, including applicable screening as appropriate for cardiovascular disease, diabetes, osteopenia/osteoporosis, and glaucoma.  As appropriate for age/gender, discussed screening for colorectal cancer, prostate cancer, breast cancer, and cervical cancer. Checklist reviewing preventive services available has been given to the patient.    Reviewed patients plan of care and provided an AVS. The Basic Care Plan (routine screening as documented in Health Maintenance) for Keyonna meets the Care Plan requirement. This Care Plan has been established and reviewed with the Patient.    Counseling Resources:  ATP IV Guidelines  Pooled Cohorts Equation Calculator  Breast Cancer Risk Calculator  Breast Cancer: Medication to Reduce Risk  FRAX Risk Assessment  ICSI Preventive Guidelines  Dietary Guidelines for Americans, 2010  Recite Me's MyPlate  ASA Prophylaxis  Lung CA Screening    Yosef Mccabe MD  [facility] " [facility] [place]    Identified Health Risks:

## 2021-10-26 LAB
ALBUMIN SERPL-MCNC: 3.8 G/DL (ref 3.4–5)
ALP SERPL-CCNC: 81 U/L (ref 40–150)
ALT SERPL W P-5'-P-CCNC: 16 U/L (ref 0–50)
ANION GAP SERPL CALCULATED.3IONS-SCNC: 10 MMOL/L (ref 3–14)
AST SERPL W P-5'-P-CCNC: 12 U/L (ref 0–45)
BILIRUB SERPL-MCNC: 0.5 MG/DL (ref 0.2–1.3)
BUN SERPL-MCNC: 8 MG/DL (ref 7–30)
CALCIUM SERPL-MCNC: 9.3 MG/DL (ref 8.5–10.1)
CHLORIDE BLD-SCNC: 108 MMOL/L (ref 94–109)
CHOLEST SERPL-MCNC: 192 MG/DL
CO2 SERPL-SCNC: 21 MMOL/L (ref 20–32)
CREAT SERPL-MCNC: 0.7 MG/DL (ref 0.52–1.04)
FASTING STATUS PATIENT QL REPORTED: YES
GFR SERPL CREATININE-BSD FRML MDRD: 88 ML/MIN/1.73M2
GLUCOSE BLD-MCNC: 89 MG/DL (ref 70–99)
HDLC SERPL-MCNC: 70 MG/DL
LDLC SERPL CALC-MCNC: 110 MG/DL
NONHDLC SERPL-MCNC: 122 MG/DL
POTASSIUM BLD-SCNC: 4.4 MMOL/L (ref 3.4–5.3)
PROT SERPL-MCNC: 7.8 G/DL (ref 6.8–8.8)
SODIUM SERPL-SCNC: 139 MMOL/L (ref 133–144)
T4 FREE SERPL-MCNC: 1.29 NG/DL (ref 0.76–1.46)
TRIGL SERPL-MCNC: 60 MG/DL
TSH SERPL DL<=0.005 MIU/L-ACNC: 0.16 MU/L (ref 0.4–4)

## 2021-10-26 NOTE — RESULT ENCOUNTER NOTE
The following letter pertains to your most recent diagnostic tests:    The thyroid tests suggest your levothyroxine dose may need to be decreased, but before changing your dose, I recommend a follow up thyroid blood test in 6-8 weeks.      The cholesterol panel looks stable..    -Liver and gallbladder tests are normal for you. (ALT,AST, Alk phos, bilirubin), kidney function is normal for you (Creatinine, GFR), Sodium is normal, Potassium is normal for you, Calcium is normal for you, Glucose (blood sugar) is normal for you.      -Your complete blood counts including your hemoglobin returned normal for you.           Follow up:  schedule a lab appointment in 6-8 weeks to recheck the thyroid function tests.        Sincerely,    Dr. Mccabe

## 2021-11-22 ENCOUNTER — OFFICE VISIT (OUTPATIENT)
Dept: OBGYN | Facility: CLINIC | Age: 71
End: 2021-11-22
Payer: COMMERCIAL

## 2021-11-22 ENCOUNTER — ANCILLARY PROCEDURE (OUTPATIENT)
Dept: ULTRASOUND IMAGING | Facility: CLINIC | Age: 71
End: 2021-11-22
Payer: COMMERCIAL

## 2021-11-22 VITALS
BODY MASS INDEX: 29.18 KG/M2 | SYSTOLIC BLOOD PRESSURE: 132 MMHG | WEIGHT: 139 LBS | DIASTOLIC BLOOD PRESSURE: 68 MMHG | HEIGHT: 58 IN

## 2021-11-22 DIAGNOSIS — N95.0 POSTMENOPAUSAL BLEEDING: Primary | ICD-10-CM

## 2021-11-22 DIAGNOSIS — N95.0 POSTMENOPAUSAL BLEEDING: ICD-10-CM

## 2021-11-22 PROCEDURE — 76856 US EXAM PELVIC COMPLETE: CPT | Performed by: OBSTETRICS & GYNECOLOGY

## 2021-11-22 PROCEDURE — 58100 BIOPSY OF UTERUS LINING: CPT | Performed by: OBSTETRICS & GYNECOLOGY

## 2021-11-22 PROCEDURE — 99203 OFFICE O/P NEW LOW 30 MIN: CPT | Performed by: OBSTETRICS & GYNECOLOGY

## 2021-11-22 PROCEDURE — 88305 TISSUE EXAM BY PATHOLOGIST: CPT | Performed by: PATHOLOGY

## 2021-11-22 PROCEDURE — 76830 TRANSVAGINAL US NON-OB: CPT | Performed by: OBSTETRICS & GYNECOLOGY

## 2021-11-22 ASSESSMENT — MIFFLIN-ST. JEOR: SCORE: 1040.25

## 2021-11-22 NOTE — PROGRESS NOTES
SUBJECTIVE:                                                   Keyonna De La Garza is a 71 year old female who presents to clinic today for the following health issue(s):  Patient presents with:  Ultrasound: follow-up      HPI:  Has episode of bleeding in [place].  Wiped and noted blood on tissue.  No further episodes  Some abdominal cramping  Some mild nause after eating.  Reports decreased appetite  no medication changes  Does have daily vaginal discharge. No odor. Sticky, no color  Denies dysuria.  No issues with bowel.        No LMP recorded. Patient is postmenopausal..    reports that she has never smoked. She has never used smokeless tobacco.  Health maintenance updated:  yes    Today's PHQ-2 Score:   PHQ-2 ( 1999 Pfizer) 10/25/2021   Q1: Little interest or pleasure in doing things 0   Q2: Feeling down, depressed or hopeless 1   PHQ-2 Score 1   PHQ-2 Total Score (12-17 Years)- Positive if 3 or more points; Administer PHQ-A if positive 1   Q1: Little interest or pleasure in doing things Not at all   Q2: Feeling down, depressed or hopeless Several days   PHQ-2 Score 1     Today's PHQ-9 Score: No flowsheet data found.  Today's JERRY-7 Score: No flowsheet data found.    Problem list and histories reviewed & adjusted, as indicated.  Additional history: as documented.    Patient Active Problem List   Diagnosis     Advanced directives, counseling/discussion     Hypothyroidism     CARDIOVASCULAR SCREENING; LDL GOAL LESS THAN 130     GERD (gastroesophageal reflux disease)     Osteopenia     Cholecystitis     Lumbago     Past Surgical History:   Procedure Laterality Date     COLONOSCOPY       LAPAROSCOPIC CHOLECYSTECTOMY  4/24/2013    Procedure: LAPAROSCOPIC CHOLECYSTECTOMY;  Laparoscopic cholecystectomy.;  Surgeon: Bong Pyle MD;  Location:  OR      Social History     Tobacco Use     Smoking status: Never Smoker     Smokeless tobacco: Never Used   Substance Use Topics     Alcohol use: Yes     Alcohol/week: 0.0  "standard drinks     Comment: 2 drinks per week      Problem (# of Occurrences) Relation (Name,Age of Onset)    C.A.D. (1) Mother    Cerebrovascular Disease (1) Father            Current Outpatient Medications   Medication Sig     acetaminophen (TYLENOL) 500 MG tablet Take 1,000 mg by mouth every 8 hours as needed for mild pain     calcium-vitamin D 500-125 MG-UNIT TABS Take 1 tablet by mouth 2 times daily     Famotidine (PEPCID PO) Every morning     levothyroxine (SYNTHROID/LEVOTHROID) 100 MCG tablet Take 1 tablet (100 mcg) by mouth daily     ferrous fumarate 65 mg, [place]. FE,-Vitamin C 125 mg (VITRON C)  MG TABS tablet Take 1 tablet by mouth daily (Patient not taking: Reported on 11/22/2021)     No current facility-administered medications for this visit.     Allergies   Allergen Reactions     Darvon [Aspirin] GI Disturbance       ROS:  12 point review of systems negative other than symptoms noted below or in the HPI.  No urinary frequency or dysuria, bladder or kidney problems      OBJECTIVE:     /68   Ht 1.473 m (4' 10\")   Wt 63 kg (139 lb)   BMI 29.05 kg/m    Body mass index is 29.05 kg/m .    Exam:  Constitutional:  Appearance: Well nourished, well developed alert, in no acute distress  Psychiatric:  Mentation appears normal and affect normal/bright.     In-Clinic Test Results:  Results for orders placed or performed in visit on 11/22/21 (from the past 24 hour(s))   US Pelvic Complete with Transvaginal    Narrative    US Pelvic Complete with Transvaginal  Order #: [id number] Accession #: [id number]      Study Notes       Brie Mujica on 11/22/2021  1:02 PM      [facility]  ULTRASOUND - PELVIC GYN- Transabdominal and Transvaginal     Referring MD: Linda Kapoor MD     ===================================     CLINICAL INFORMATION     Indications for ultrasound:   Bleeding/Menses - Post ashly bleeding     LMP: 10+ years    Hormones: none     Measurements:  Uterus:  4.97 x 3.63 x " 2.75 cm     Position is anteverted.  Contour is smooth/regular.     Endo cav: 4.67 mm       Smooth/regular/wnl     Right ovary: 1.30 x 0.85 x 1.55 cm  Wnl  Left ovary:   NV due to overlying bowel      Cul de sac: no free fluid     ===================================  Impression:      Normal uterine contours.  4.6mm endometrial cavity, regular in appearance  Normal right ovary, left ovary not visualized    Linda Kapoor MD          ASSESSMENT/PLAN:                                                        ICD-10-CM    1. Postmenopausal bleeding  N95.0 Surgical Pathology Exam       There are no Patient Instructions on file for this visit.    Reviewed ultrasound report.  Recommended endometrial biopsy for further evaluation.  Patient agreed.    (30 minutes was spent on the date of the encounter doing chart review, review of outside records, review and interpretation of pertinent test results, history and exam, documentation, patient counseling, and further activities as noted above. In addition to the time spent performing procedure below.)     Linda Kapoor MD  [facility] [place]    INDICATIONS:                                                      Having endometrial biopsy for post-menopausal bleeding      PROCEDURE;                                                      A speculum was placed in the vagina and cervix prepped with betadine. Cervix and vaginal canal was normal other than some mild atrophy. A tenaculum was attached to the cervix. A small plastic 5 mm Pipelle syringe curette was inserted into the cervical canal. The uterus was sounded to 6 cm's. A vigorous four quadrant biopsy was performed, removing amount scant of tissue. The speculum was removed. This tissue was placed in [place] and sent to pathology.    The patient tolerated the procedure  fairly well and she reported there was no cramping.      POST PROCEDURE;                                                      There  was no  cramping at the time of discharge. She  tolerated the procedure well. There were no complications. Patient was discharged in stable condition.    Patient advised to call the clinic if severe pelvic pain, fever or heavy bleeding.    Linda Kapoor MD

## 2021-11-24 LAB
PATH REPORT.COMMENTS IMP SPEC: NORMAL
PATH REPORT.FINAL DX SPEC: NORMAL
PATH REPORT.GROSS SPEC: NORMAL
PATH REPORT.MICROSCOPIC SPEC OTHER STN: NORMAL
PATH REPORT.RELEVANT HX SPEC: NORMAL
PHOTO IMAGE: NORMAL

## 2021-12-13 ENCOUNTER — LAB (OUTPATIENT)
Dept: LAB | Facility: CLINIC | Age: 71
End: 2021-12-13
Payer: COMMERCIAL

## 2021-12-13 DIAGNOSIS — E03.9 HYPOTHYROIDISM, UNSPECIFIED TYPE: ICD-10-CM

## 2021-12-13 LAB — TSH SERPL DL<=0.005 MIU/L-ACNC: 2.36 MU/L (ref 0.4–4)

## 2021-12-13 PROCEDURE — 36415 COLL VENOUS BLD VENIPUNCTURE: CPT

## 2021-12-13 PROCEDURE — 84443 ASSAY THYROID STIM HORMONE: CPT

## 2021-12-13 NOTE — LETTER
December 14, 2021      Keyonna De La Garza  [address] [facility] [address]        Dear MsEmeterioFrederic,    The following letter pertains to your most recent diagnostic tests:     -TSH (thyroid stimulating hormone) level is normal which indicates normal circulating thyroid hormone levels.           Sincerely,     Dr. Mccabe       Resulted Orders   **TSH with free T4 reflex FUTURE 2mo   Result Value Ref Range    TSH 2.36 0.40 - 4.00 mU/L

## 2021-12-13 NOTE — RESULT ENCOUNTER NOTE
The following letter pertains to your most recent diagnostic tests:    -TSH (thyroid stimulating hormone) level is normal which indicates normal circulating thyroid hormone levels.          Sincerely,    Dr. Mccabe

## 2021-12-20 DIAGNOSIS — E03.9 HYPOTHYROIDISM, UNSPECIFIED TYPE: ICD-10-CM

## 2021-12-22 RX ORDER — LEVOTHYROXINE SODIUM 100 UG/1
100 TABLET ORAL DAILY
Qty: 90 TABLET | Refills: 2 | Status: SHIPPED | OUTPATIENT
Start: 2021-12-22 | End: 2022-09-20

## 2021-12-22 NOTE — TELEPHONE ENCOUNTER
Prescription approved per [facility] Refill Protocol.  LOV: 10/25/21    Sandra Lei RN  [facility]

## 2022-05-16 ENCOUNTER — HOSPITAL ENCOUNTER (EMERGENCY)
Facility: CLINIC | Age: 72
Discharge: HOME | End: 2022-05-17
Attending: EMERGENCY MEDICINE | Admitting: EMERGENCY MEDICINE
Payer: COMMERCIAL

## 2022-05-16 ENCOUNTER — NURSE TRIAGE (OUTPATIENT)
Dept: NURSING | Facility: CLINIC | Age: 72
End: 2022-05-16
Payer: COMMERCIAL

## 2022-05-16 DIAGNOSIS — R07.9 CHEST PAIN, UNSPECIFIED TYPE: ICD-10-CM

## 2022-05-16 LAB
ALBUMIN SERPL-MCNC: 3.7 G/DL (ref 3.4–5)
ALP SERPL-CCNC: 83 U/L (ref 40–150)
ALT SERPL W P-5'-P-CCNC: 17 U/L (ref 0–50)
ANION GAP SERPL CALCULATED.3IONS-SCNC: 6 MMOL/L (ref 3–14)
AST SERPL W P-5'-P-CCNC: 12 U/L (ref 0–45)
ATRIAL RATE - MUSE: 80 BPM
BASOPHILS # BLD AUTO: 0.1 10E3/UL (ref 0–0.2)
BASOPHILS NFR BLD AUTO: 1 %
BILIRUB SERPL-MCNC: 0.1 MG/DL (ref 0.2–1.3)
BUN SERPL-MCNC: 9 MG/DL (ref 7–30)
CALCIUM SERPL-MCNC: 9.1 MG/DL (ref 8.5–10.1)
CHLORIDE BLD-SCNC: 106 MMOL/L (ref 94–109)
CO2 SERPL-SCNC: 28 MMOL/L (ref 20–32)
CREAT SERPL-MCNC: 0.65 MG/DL (ref 0.52–1.04)
DIASTOLIC BLOOD PRESSURE - MUSE: NORMAL MMHG
EOSINOPHIL # BLD AUTO: 0.2 10E3/UL (ref 0–0.7)
EOSINOPHIL NFR BLD AUTO: 2 %
ERYTHROCYTE [DISTWIDTH] IN BLOOD BY AUTOMATED COUNT: 12.6 % (ref 10–15)
GFR SERPL CREATININE-BSD FRML MDRD: >90 ML/MIN/1.73M2
GLUCOSE BLD-MCNC: 101 MG/DL (ref 70–99)
HCT VFR BLD AUTO: 40.8 % (ref 35–47)
HGB BLD-MCNC: 13.2 G/DL (ref 11.7–15.7)
HOLD SPECIMEN: NORMAL
HOLD SPECIMEN: NORMAL
IMM GRANULOCYTES # BLD: 0 10E3/UL
IMM GRANULOCYTES NFR BLD: 0 %
INTERPRETATION ECG - MUSE: NORMAL
LYMPHOCYTES # BLD AUTO: 3.7 10E3/UL (ref 0.8–5.3)
LYMPHOCYTES NFR BLD AUTO: 32 %
MCH RBC QN AUTO: 29.3 PG (ref 26.5–33)
MCHC RBC AUTO-ENTMCNC: 32.4 G/DL (ref 31.5–36.5)
MCV RBC AUTO: 91 FL (ref 78–100)
MONOCYTES # BLD AUTO: 0.9 10E3/UL (ref 0–1.3)
MONOCYTES NFR BLD AUTO: 8 %
NEUTROPHILS # BLD AUTO: 6.8 10E3/UL (ref 1.6–8.3)
NEUTROPHILS NFR BLD AUTO: 57 %
NRBC # BLD AUTO: 0 10E3/UL
NRBC BLD AUTO-RTO: 0 /100
P AXIS - MUSE: 76 DEGREES
PLATELET # BLD AUTO: 382 10E3/UL (ref 150–450)
POTASSIUM BLD-SCNC: 3.9 MMOL/L (ref 3.4–5.3)
PR INTERVAL - MUSE: 142 MS
PROT SERPL-MCNC: 7.9 G/DL (ref 6.8–8.8)
QRS DURATION - MUSE: 68 MS
QT - MUSE: 374 MS
QTC - MUSE: 431 MS
R AXIS - MUSE: 22 DEGREES
RBC # BLD AUTO: 4.51 10E6/UL (ref 3.8–5.2)
SODIUM SERPL-SCNC: 140 MMOL/L (ref 133–144)
SYSTOLIC BLOOD PRESSURE - MUSE: NORMAL MMHG
T AXIS - MUSE: 51 DEGREES
TROPONIN I SERPL HS-MCNC: <3 NG/L
VENTRICULAR RATE- MUSE: 80 BPM
WBC # BLD AUTO: 11.8 10E3/UL (ref 4–11)

## 2022-05-16 PROCEDURE — 82040 ASSAY OF SERUM ALBUMIN: CPT | Performed by: EMERGENCY MEDICINE

## 2022-05-16 PROCEDURE — 84484 ASSAY OF TROPONIN QUANT: CPT | Performed by: EMERGENCY MEDICINE

## 2022-05-16 PROCEDURE — 85025 COMPLETE CBC W/AUTO DIFF WBC: CPT | Performed by: EMERGENCY MEDICINE

## 2022-05-16 PROCEDURE — 36415 COLL VENOUS BLD VENIPUNCTURE: CPT | Performed by: EMERGENCY MEDICINE

## 2022-05-16 PROCEDURE — 85379 FIBRIN DEGRADATION QUANT: CPT | Performed by: EMERGENCY MEDICINE

## 2022-05-16 PROCEDURE — 80053 COMPREHEN METABOLIC PANEL: CPT | Performed by: EMERGENCY MEDICINE

## 2022-05-16 PROCEDURE — 99285 EMERGENCY DEPT VISIT HI MDM: CPT | Mod: 25

## 2022-05-16 PROCEDURE — 93005 ELECTROCARDIOGRAM TRACING: CPT

## 2022-05-16 NOTE — TELEPHONE ENCOUNTER
"Nurse Triage SBAR    Is this a 2nd Level Triage? NO    Situation: Patient calling.  Consent: not needed    Background: Has covid last week in January.     Assessment:   * Chest mild pain.  Up on left hand side - armpit area.  Pain travels into back but pt states she felt it may be arthritis related.    * Pain/pressure in chest began a week ago.  \"It had come and gone, but now through the weekend and into today it's been pretty consistent.  Lasts all day.  Doesn't get worse with exertion.  Pt rates it as a mild pain and rates it a 3.    * Pt states mother had heart disease.  Pt has no dx of heart issues. Denies lung disease.    * Pt believes this may be ulcer related as provider had said she may have an ulcer.  Pt also thinks it may be related to her arthritis.    * Pt states this is different this time as it's no longer able to be treated with tums or rolaids and in the past those have managed indigestion well for her.    Denies any other symptoms.        Protocol Recommended Disposition:   Call 911     Recommendation: Advised patient to Call 911 . Reviewed concerning symptoms and when to call back.  Pt is at work now - she will get her car back in a couple hours and plans to go to the ED after that.  Writer shared with pt that she is able to make her own medical choices but reiterated that our recommendation is to call 911 at this time.          Willow Calabrese RN [facility] 5/16/2022 12:11 PM          COVID 19 Nurse Triage Plan/Patient Instructions    Please be aware that novel coronavirus (COVID-19) may be circulating in the community. If you develop symptoms such as fever, cough, or SOB or if you have concerns about the presence of another infection including coronavirus (COVID-19), please contact your health care provider or visit [email or web address].     Disposition/Instructions    Call to EMS/911 recommended. Follow protocol based instructions.     Bring Your Own Device:  Please " also bring your smart device(s) (smart phones, tablets, laptops) and their charging cables for your personal use and to communicate with your care team during your visit.    Thank you for taking steps to prevent the spread of this virus.  o Limit your contact with others.  o Wear a simple mask to cover your cough.  o Wash your hands well and often.    Resources    [facility]: About COVID-19: [email or web address]    CDC: What to Do If You're Sick: www.cdc.gov/coronavirus/2019-ncov/about/steps-when-sick.html    CDC: Ending Home Isolation: www.cdc.gov/coronavirus/2019-ncov/hcp/disposition-in-home-patients.html     CDC: Caring for Someone: www.cdc.gov/coronavirus/2019-ncov/if-you-are-sick/care-for-someone.html     [facility]: Interim Guidance for Hospital Discharge to Home: www.[facility].mn./diseases/coronavirus/hcp/hospdischarge.pdf    [facility] clinical trials (COVID-19 research studies): [email or web address]-clinical-trials     Below are the COVID-19 hotlines at the [place] Department of Health ([facility]). Interpreters are available.   o For health questions: Call 802-015-1498 or 1-892.540.8385 (7 a.m. to 7 p.m.)  o For questions about schools and childcare: Call 828-428-7306 or 1-119.288.5693 (7 a.m. to 7 p.m.)                         Reason for Disposition    Chest pain lasting longer than 5 minutes and ANY of the following:* Over 45 years old* Over 30 years old and at least one cardiac risk factor (e.g., diabetes, high blood pressure, high cholesterol, smoker, or strong family history of heart disease)* History of heart disease (i.e., angina, heart attack, heart failure, bypass surgery, takes nitroglycerin)* Pain is crushing, pressure-like, or heavy    Additional Information    Negative: Severe difficulty breathing (e.g., struggling for each breath, speaks in single words)    Negative: Passed out (i.e., fainted, collapsed and was not responding)    Negative: Difficult to awaken  or acting confused (e.g., disoriented, slurred speech)    Negative: Shock suspected (e.g., cold/pale/clammy skin, too weak to stand, low BP, rapid pulse)    Protocols used: CHEST PAIN-A-[place]

## 2022-05-17 ENCOUNTER — APPOINTMENT (OUTPATIENT)
Dept: GENERAL RADIOLOGY | Facility: CLINIC | Age: 72
End: 2022-05-17
Attending: EMERGENCY MEDICINE
Payer: COMMERCIAL

## 2022-05-17 VITALS
SYSTOLIC BLOOD PRESSURE: 151 MMHG | TEMPERATURE: 97.8 F | HEART RATE: 78 BPM | HEIGHT: 59 IN | WEIGHT: 141 LBS | RESPIRATION RATE: 13 BRPM | DIASTOLIC BLOOD PRESSURE: 78 MMHG | BODY MASS INDEX: 28.43 KG/M2 | OXYGEN SATURATION: 93 %

## 2022-05-17 LAB — D DIMER PPP FEU-MCNC: 0.31 UG/ML FEU (ref 0–0.5)

## 2022-05-17 PROCEDURE — 71046 X-RAY EXAM CHEST 2 VIEWS: CPT

## 2022-05-17 PROCEDURE — 250N000013 HC RX MED GY IP 250 OP 250 PS 637: Performed by: EMERGENCY MEDICINE

## 2022-05-17 RX ORDER — IBUPROFEN 400 MG/1
400 TABLET, FILM COATED ORAL ONCE
Status: COMPLETED | OUTPATIENT
Start: 2022-05-17 | End: 2022-05-17

## 2022-05-17 RX ADMIN — IBUPROFEN 400 MG: 400 TABLET, FILM COATED ORAL at 00:15

## 2022-05-17 NOTE — ED TRIAGE NOTES
Pt reports constant left sided chest pain radiating into left shoulder x couple weeks, pt states worse over past couple days. Denies sob, nausea or vomiting     Triage Assessment     Row Name 05/16/22 2018       Triage Assessment (Adult)    Airway WDL WDL       Respiratory WDL    Respiratory WDL WDL       Skin Circulation/Temperature WDL    Skin Circulation/Temperature WDL WDL       Cardiac WDL    Cardiac WDL X  left sided constant chest pain x couple weeks

## 2022-05-17 NOTE — ED PROVIDER NOTES
History     Chief Complaint:  Chest Pain       HPI   Keyonna De La Garza is a 71 year old female who presents with chest pain.  She indicates that she has had left upper chest pain for the last couple of weeks that has been intermittent up until a few days ago when it became constant.  The pain is in the very upper left chest as well as into the left shoulder posteriorly.  She describes it as a sharp and tight feeling.  She denies any shortness of breath, fevers, cough, nausea, vomiting, or abdominal pain.  She initially thought it might be secondary to reflux and therefore she tried some over-the-counter remedies for that which at times seem to help a little bit.    ROS:  Review of Systems   All other systems reviewed and are negative.        Allergies:  External Allergen Needs Reconciliation - See Comment  Lasix [Furosemide]     Medications:    acetaminophen (TYLENOL) 500 MG tablet  calcium-vitamin D 500-125 MG-UNIT TABS  Famotidine (PEPCID PO)  ferrous fumarate 65 mg, [place]. FE,-Vitamin C 125 mg (VITRON C)  MG TABS tablet  levothyroxine (SYNTHROID/LEVOTHROID) 100 MCG tablet        Past Medical History:    Past Medical History:   Diagnosis Date     Abdominal pain, other specified site 2013     Gallstones 2013     GERD (gastroesophageal reflux disease)      Hypothyroidism        Past Surgical History:    Past Surgical History:   Procedure Laterality Date     COLONOSCOPY       LAPAROSCOPIC CHOLECYSTECTOMY  4/24/2013    Procedure: LAPAROSCOPIC CHOLECYSTECTOMY;  Laparoscopic cholecystectomy.;  Surgeon: Bogn Pyle MD;  Location:  OR        Family History:    family history includes C.A.D. in her mother; Cerebrovascular Disease in her father.    Social History:   reports that she has never smoked. She has never used smokeless tobacco. She reports current alcohol use. She reports that she does not use drugs.  PCP: Yosef Mccabe     Physical Exam     Patient Vitals for the past 24 hrs:   BP Temp Temp  "src Pulse Resp SpO2 Height Weight   05/16/22 2310 (!) 175/91 -- -- 85 18 100 % -- --   05/16/22 2021 (!) 164/75 97.8  F (36.6  C) Temporal 74 16 98 % 1.499 m (4' 11\") 64 kg (141 lb)        Physical Exam  Nursing note and vitals reviewed.  Constitutional:  Oriented to person, place, and time. Cooperative.   HENT:   Nose:    Nose normal.   Mouth/Throat:   Facemask in place.   Eyes:    Conjunctivae normal and EOM are normal.      Pupils are equal, round, and reactive to light.   Neck:    Trachea normal.   Cardiovascular:  Normal rate, regular rhythm, normal heart sounds and normal pulses. No murmur heard.  Pulmonary/Chest:  Effort normal and breath sounds normal.   Abdominal:   Soft. Normal appearance and bowel sounds are normal.      There is no tenderness.      There is no rebound and no CVA tenderness.   Musculoskeletal:  Reproducible tenderness to palpation in the left upper chest wall as well as to the left lower cervical spine region.  Extremities atraumatic x 4.   Lymphadenopathy:  No cervical adenopathy.   Neurological:   Alert and oriented to person, place, and time. Normal strength.      No cranial nerve deficit or sensory deficit. GCS eye subscore is 4. GCS verbal subscore is 5. GCS motor subscore is 6.   Skin:    Skin is intact. No rash noted.   Psychiatric:   Normal mood and affect.      Emergency Department Course   ECG:  ECG results from 05/16/22   EKG 12 lead     Value    Systolic Blood Pressure     Diastolic Blood Pressure     Ventricular Rate 80    Atrial Rate 80    SD Interval 142    QRS Duration 68        QTc 431    P Axis 76    R AXIS 22    T Axis 51    Interpretation ECG      Sinus rhythm  Possible Anterior infarct , age undetermined  Abnormal ECG  No previous ECGs available  Confirmed by GENERATED REPORT, COMPUTER ([id number]),  Aasen, Bradley ([id number]) on 5/16/2022 8:30:16 PM         Imaging:  XR Chest 2 Views   Final Result   IMPRESSION: Shallow inspiration. Cardiomediastinal silhouette is " within normal limits. Slight interstitial prominence. No focal consolidation or significant effusion. Surgical clips right upper quadrant. Degenerative change osseous structures.         Report per radiology    Laboratory:  Labs Ordered and Resulted from Time of ED Arrival to Time of ED Departure   COMPREHENSIVE METABOLIC PANEL - Abnormal       Result Value    Sodium 140      Potassium 3.9      Chloride 106      Carbon Dioxide (CO2) 28      Anion Gap 6      Urea Nitrogen 9      Creatinine 0.65      Calcium 9.1      Glucose 101 (*)     Alkaline Phosphatase 83      AST 12      ALT 17      Protein Total 7.9      Albumin 3.7      Bilirubin Total 0.1 (*)     GFR Estimate >90     CBC WITH PLATELETS AND DIFFERENTIAL - Abnormal    WBC Count 11.8 (*)     RBC Count 4.51      Hemoglobin 13.2      Hematocrit 40.8      MCV 91      MCH 29.3      MCHC 32.4      RDW 12.6      Platelet Count 382      % Neutrophils 57      % Lymphocytes 32      % Monocytes 8      % Eosinophils 2      % Basophils 1      % Immature Granulocytes 0      NRBCs per 100 WBC 0      Absolute Neutrophils 6.8      Absolute Lymphocytes 3.7      Absolute Monocytes 0.9      Absolute Eosinophils 0.2      Absolute Basophils 0.1      Absolute Immature Granulocytes 0.0      Absolute NRBCs 0.0     TROPONIN I - Normal    Troponin I High Sensitivity <3     D DIMER QUANTITATIVE - Normal    D-Dimer Quantitative 0.31            Emergency Department Course:      Reviewed:  I reviewed nursing notes, vitals, past medical history, Care Everywhere and [facility]      Interventions:  Medications   ibuprofen (ADVIL/MOTRIN) tablet 400 mg (400 mg Oral Given 5/17/22 0015)        Disposition:  The patient was discharged to home.     Impression & Plan    Medical Decision Making:  This is a 71-year-old female who came in for further evaluation of chest pain.  Her pain is now been present constantly for the last few days and was present intermittently before that.  She has a normal exam here  other than some reproducible tenderness to palpation.  Her EKG is unremarkable, and her blood work also was unremarkable other than a slightly elevated WBC.  With a negative D-dimer, I feel that pulmonary embolism has been sufficiently ruled out.  Her chest x-ray also does not appear to show anything significant.  She was provided ibuprofen here, which did seem to help somewhat.  I think given her negative work-up here, and especially her negative troponin, it is very unlikely that this is cardiac in nature.  Her pain also seems quite reproducible, making musculoskeletal causes more likely.  This could be also secondary to a herniated disc or pinched nerve in her cervical spine region.  It also could be potentially GI related, however I think that is less likely.  I think she is safe for discharge and outpatient management.  I discussed proceeding with an outpatient stress test, and she prefers to discuss that further with her primary physician.  I think that is reasonable, and I stressed the importance of close outpatient follow-up.  She also knows to return here with any concerns or worsening symptoms.  I recommended she continue with ibuprofen or Tylenol as well as ice or heat to see if they might help.    Diagnosis:    ICD-10-CM    1. Chest pain, unspecified type  R07.9             5/16/2022   Vasquez Garay MD Lashkowitz, Seth H, MD  05/17/22 0211

## 2022-05-17 NOTE — ED NOTES
Agree with triage note.   Patient states L sided chest pain has been intermittent, but more frequent recently. Currently at 2-3/10. Denies recent trauma or heavy lifting. NSR on cardiac monitor, breathing non labored.

## 2022-05-26 ENCOUNTER — OFFICE VISIT (OUTPATIENT)
Dept: FAMILY MEDICINE | Facility: CLINIC | Age: 72
End: 2022-05-26
Payer: COMMERCIAL

## 2022-05-26 VITALS
SYSTOLIC BLOOD PRESSURE: 122 MMHG | TEMPERATURE: 97.3 F | HEART RATE: 66 BPM | OXYGEN SATURATION: 100 % | DIASTOLIC BLOOD PRESSURE: 76 MMHG | HEIGHT: 59 IN | BODY MASS INDEX: 28.63 KG/M2 | WEIGHT: 142 LBS

## 2022-05-26 DIAGNOSIS — N95.0 POST-MENOPAUSAL BLEEDING: ICD-10-CM

## 2022-05-26 DIAGNOSIS — M75.102 ROTATOR CUFF SYNDROME OF LEFT SHOULDER: Primary | ICD-10-CM

## 2022-05-26 DIAGNOSIS — Z23 HIGH PRIORITY FOR 2019-NCOV VACCINE: ICD-10-CM

## 2022-05-26 DIAGNOSIS — L82.1 SEBORRHEIC KERATOSES: ICD-10-CM

## 2022-05-26 PROCEDURE — 99213 OFFICE O/P EST LOW 20 MIN: CPT | Mod: 25 | Performed by: INTERNAL MEDICINE

## 2022-05-26 PROCEDURE — 0054A COVID-19,PF,PFIZER (12+ YRS): CPT | Performed by: INTERNAL MEDICINE

## 2022-05-26 PROCEDURE — 91305 COVID-19,PF,PFIZER (12+ YRS): CPT | Performed by: INTERNAL MEDICINE

## 2022-05-26 ASSESSMENT — PAIN SCALES - GENERAL: PAINLEVEL: NO PAIN (1)

## 2022-05-26 NOTE — PROGRESS NOTES
"  Assessment & Plan     Rotator cuff syndrome of left shoulder  I suspect rotator cuff syndrome  Trial of PT   Return for injection if PT is too painful  Consider imaging if PT does not help enough over 3-4 weeks   - Physical Therapy Referral; Future    Seborrheic keratoses  Reassurance about these     Post-menopausal bleeding  Return to GYN if bleeding reoccurs     39 minutes spent on the date of the encounter doing chart review, history and exam, documentation and further activities per the note         Return in about 1 month (around 6/26/2022) for recheck if symptoms persist.  Patient instructed to return to clinic or contact us sooner if symptoms worsen or new symptoms develop.       Yosef Mccabe MD  [facility] GILMER Yoder is a 71 year old who presents for the following health issues     HPI     ED Followup:    Facility:  [facility] Emergency Dept  Date of visit: 5/16/2022 - 5/17/2022 (3 hours)  Reason for visit: Chest pain, unspecified type   Current Status: stable with some pain     2 week history of pain in left upper chest and shoulder that worsens when she raises her left arm  Work up in ER included D-dimer, Troponin, negative EKG, chest x-ray normal  No antecedent injury or trauma  Ice, heat and APAP at home are not helping enough   She had questions about her GYN visit too, no further bleeding  She had questions about velvety skin lesions on her legs that she has noted over the last year  Finally she had questions about whether she should get a COVID vaccine    Review of Systems         Objective    /76 (BP Location: Left arm, Patient Position: Sitting, Cuff Size: Adult Regular)   Pulse 66   Temp 97.3  F (36.3  C) (Temporal)   Ht 1.499 m (4' 11\")   Wt 64.4 kg (142 lb)   SpO2 100%   BMI 28.68 kg/m    Body mass index is 28.68 kg/m .  Physical Exam   GENERAL: healthy, alert and no distress  NECK: no adenopathy, no asymmetry, masses, or scars " and thyroid normal to palpation  RESP: lungs clear to auscultation - no rales, rhonchi or wheezes  CV: Heart with regular rate and rhythm. No pericardial friction rub.    MS: left shoulder impingement sign noted (reproduces chief complaint symptoms), normal strength of left rotator cuff muscles, no bony tenderness over structures of shoulder, Spurling's maneuver does not produce radicular symptoms on the left  NEURO: Normal strength and tone, mentation intact and speech normal  PSYCH: mentation appears normal, affect normal/bright  SKIN:  She points to several SK's on her legs

## 2022-06-06 ENCOUNTER — THERAPY VISIT (OUTPATIENT)
Dept: PHYSICAL THERAPY | Facility: CLINIC | Age: 72
End: 2022-06-06
Attending: INTERNAL MEDICINE
Payer: COMMERCIAL

## 2022-06-06 DIAGNOSIS — G89.29 CHRONIC LEFT SHOULDER PAIN: ICD-10-CM

## 2022-06-06 DIAGNOSIS — M25.512 CHRONIC LEFT SHOULDER PAIN: ICD-10-CM

## 2022-06-06 DIAGNOSIS — M75.102 ROTATOR CUFF SYNDROME OF LEFT SHOULDER: ICD-10-CM

## 2022-06-06 PROCEDURE — 97161 PT EVAL LOW COMPLEX 20 MIN: CPT | Mod: GP | Performed by: PHYSICAL THERAPIST

## 2022-06-06 PROCEDURE — 97110 THERAPEUTIC EXERCISES: CPT | Mod: GP | Performed by: PHYSICAL THERAPIST

## 2022-06-06 PROCEDURE — 97530 THERAPEUTIC ACTIVITIES: CPT | Mod: GP | Performed by: PHYSICAL THERAPIST

## 2022-06-06 NOTE — PROGRESS NOTES
Physical Therapy Initial Evaluation    Physical Therapy Initial Examination/Evaluation  June 6, 2022    Keyonna De La Garza  is a 71 year old  female referred to physical therapy by Dr. Yosef Mccabe for treatment of L shoulder pain.      DOI/onset 3-15-22  Mechanism of injury Patient had a sudden onset of L shoulder pain upon waking with no known incident.   DOS n/a  Prior treatment none.    Chief Complaint:   Minimal L shoulder pain.   Pain location: L anterior shoulder  Quality: aching  Constant/Intermittent: intermittent  Symptoms have improved significantly since onset.    Current pain 1/10.  Pain at best 1/10.  Pain at worst 2/10 now.    Symptoms aggravated by overhead reaching/lifting, sleeping on L side.    Symptoms improved with rest.     Social history:  single.    Occupation: .  Job duties:  Computer work, prolonged sitting.    Patient having difficulty with ADLs: none.    Patient's goals are to prevent further shoulder problems.    Patient reports general health as good.  Related medical history no hx of L shoulder injury.    Surgical History:  none.    Imaging: none.    Medications:  none.       Return to MD:  If needed.      Clinical Impression: Patient should respond well to continued PT intervention working to improve L shoulder ROM and strength through therapeutic exercise.    Subjective:  HPI                    Objective:  Standing Alignment:    Cervical/Thoracic:  Forward head  Shoulder/UE:  Rounded shoulders                  Flexibility/Screens:   Positive screens:  Shoulder  Upper Extremity:    Decreased left upper extremity flexibility at:  Pectoralis Major    Decreased right upper extremity flexibility present at:  Pectoralis Major                           Shoulder Evaluation:  ROM:  AROM:    Flexion:  Left:  145    Right:  145    Abduction:  Left: 144   Right:  148    Internal Rotation:  Left:  70    Right:  72  External Rotation:  Left:  78    Right:  78                       Strength:    Flexion: Left:4/5   Pain:        Abduction:  Left: 4-/5  Pain:        Internal Rotation:  Left:4+/5     Pain:      External Rotation:   Left:4/5     Pain:             Stability Testing:  normal      Special Tests:    Left shoulder positive for the following special tests:  Impingement  Left shoulder negative for the following special tests:  Labral    Palpation:    Left shoulder tenderness present at:  Supraspinatus and Infraspinatus    Mobility Tests:  normal                                                 General     ROS    Assessment/Plan:    Patient is a 71 year old female with left side shoulder complaints.    Patient has the following significant findings with corresponding treatment plan.                Diagnosis 1:  L shoulder pain  Pain -  self management and education  Decreased ROM/flexibility - manual therapy and therapeutic exercise  Decreased strength - therapeutic exercise and therapeutic activities  Impaired muscle performance - neuro re-education  Decreased function - therapeutic activities    Therapy Evaluation Codes:   1) History comprised of:   Personal factors that impact the plan of care:      None.    Comorbidity factors that impact the plan of care are:      None.     Medications impacting care: None.  2) Examination of Body Systems comprised of:   Body structures and functions that impact the plan of care:      Shoulder.   Activity limitations that impact the plan of care are:      Sleeping.  3) Clinical presentation characteristics are:   Stable/Uncomplicated.  4) Decision-Making    Low complexity using standardized patient assessment instrument and/or measureable assessment of functional outcome.  Cumulative Therapy Evaluation is: Low complexity.    Previous and current functional limitations:  (See Goal Flow Sheet for this information)    Short term and Long term goals: (See Goal Flow Sheet for this information)     Communication ability:  Patient appears to be able  to clearly communicate and understand verbal and written communication and follow directions correctly.  Treatment Explanation - The following has been discussed with the patient:   RX ordered/plan of care  Anticipated outcomes  Possible risks and side effects  This patient would benefit from PT intervention to resume normal activities.   Rehab potential is good.    Frequency:  1 X week, once daily  Duration:  for 1 week  Discharge Plan:  Achieve all LTG.  Independent in home treatment program.  Reach maximal therapeutic benefit.    Please refer to the daily flowsheet for treatment today, total treatment time and time spent performing 1:1 timed codes.

## 2022-06-07 NOTE — PROGRESS NOTES
Physical Therapy Initial Evaluation  Subjective:    Patient Health History             Pertinent medical history includes: thyroid problems, osteoarthritis and migraines/headaches.            Current medications:  Thyroid medication and anti-inflammatory.    Current occupation is .   Primary job tasks include:  Computer work and prolonged sitting.                                    Objective:  System    Physical Exam    General     ROS    Assessment/Plan:

## 2022-07-11 ENCOUNTER — APPOINTMENT (OUTPATIENT)
Dept: URBAN - METROPOLITAN AREA CLINIC 240 | Age: 72
Setting detail: DERMATOLOGY
End: 2022-07-11

## 2022-07-11 DIAGNOSIS — L72.8 OTHER FOLLICULAR CYSTS OF THE SKIN AND SUBCUTANEOUS TISSUE: ICD-10-CM

## 2022-07-11 PROCEDURE — 99212 OFFICE O/P EST SF 10 MIN: CPT

## 2022-07-11 PROCEDURE — OTHER COUNSELING: OTHER

## 2022-07-11 PROCEDURE — OTHER DEFER: OTHER

## 2022-07-11 ASSESSMENT — LOCATION SIMPLE DESCRIPTION DERM: LOCATION SIMPLE: LEFT CHEEK

## 2022-07-11 ASSESSMENT — LOCATION DETAILED DESCRIPTION DERM: LOCATION DETAILED: LEFT LATERAL BUCCAL CHEEK

## 2022-07-11 ASSESSMENT — LOCATION ZONE DERM: LOCATION ZONE: FACE

## 2022-07-11 NOTE — PROCEDURE: DEFER
Procedure To Be Performed At Next Visit: Excision by punch method
Introduction Text (Please End With A Colon): The following procedure was deferred:
Scheduling Instructions (Optional): 15 min appt
Detail Level: Detailed

## 2022-08-15 ENCOUNTER — APPOINTMENT (OUTPATIENT)
Dept: URBAN - METROPOLITAN AREA CLINIC 240 | Age: 72
Setting detail: DERMATOLOGY
End: 2022-08-15

## 2022-08-15 DIAGNOSIS — L72.8 OTHER FOLLICULAR CYSTS OF THE SKIN AND SUBCUTANEOUS TISSUE: ICD-10-CM

## 2022-08-15 PROCEDURE — OTHER COUNSELING: OTHER

## 2022-08-15 PROCEDURE — 11440 EXC FACE-MM B9+MARG 0.5 CM/<: CPT

## 2022-08-15 PROCEDURE — OTHER PUNCH EXCISION (NO PATHOLOGY): OTHER

## 2022-08-15 PROCEDURE — A4550 SURGICAL TRAYS: HCPCS

## 2022-08-15 ASSESSMENT — LOCATION SIMPLE DESCRIPTION DERM: LOCATION SIMPLE: LEFT CHEEK

## 2022-08-15 ASSESSMENT — LOCATION DETAILED DESCRIPTION DERM: LOCATION DETAILED: LEFT MEDIAL MANDIBULAR CHEEK

## 2022-08-15 ASSESSMENT — LOCATION ZONE DERM: LOCATION ZONE: FACE

## 2022-08-15 NOTE — PROCEDURE: PUNCH EXCISION (NO PATHOLOGY)
Complex Requirements: Involvement Of Free Margin?: No
Intermediate Repair Preamble Text (Leave Blank If You Do Not Want): Undermining was performed with blunt dissection.
5 Mm Punch Excision Text: A 5 mm punch was used to make an initial incision over the lesion.  After this overlying column of skin was removed, blunt dissection was used to free the lesion from the surrounding tissues and the lesion was extirpated through the surgical opening made by the punch biopsy.
Anesthesia Volume In Cc: 0
12 Mm Punch Excision Text: A 12 mm punch was used to make an initial incision over the lesion.  After this overlying column of skin was removed, blunt dissection was used to free the lesion from the surrounding tissues and the lesion was extirpated through the surgical opening made by the punch biopsy.
Debridement Text: The wound edges were debrided prior to proceeding with the closure to facilitate wound healing.
Suture Removal: 7 days
Undermining Type: Entire Wound
Epidermal Closure: simple interrupted
Anesthesia Type: 1% lidocaine with epinephrine
Anesthesia Volume In Cc: 1.5
3 Mm Punch Excision Text: A 3 mm punch was used to make an initial incision over the lesion.  After this overlying column of skin was removed, blunt dissection was used to free the lesion from the surrounding tissues and the lesion was extirpated through the surgical opening made by the punch biopsy.
7 Mm Punch Excision Text: A 7 mm punch was used to make an initial incision over the lesion.  After this overlying column of skin was removed, blunt dissection was used to free the lesion from the surrounding tissues and the lesion was extirpated through the surgical opening made by the punch biopsy.
Retention Suture Text: Retention sutures were placed to support the closure and prevent dehiscence.
Medical Necessity Clause: The excision was medically necessary because the lesion which was excised was
Epidermal Sutures: 4-0 Ethilon
Intermediate / Complex Repair - Final Wound Length In Cm: 0.4
Bill For Surgical Tray: yes
Additional Anesthesia Volume In Cc: 6
Consent was obtained from the patient. The risks and benefits to therapy were discussed in detail. Specifically, the risks of infection, scarring, bleeding, prolonged wound healing, incomplete removal, allergy to anesthesia, nerve injury and recurrence were addressed. Prior to the procedure, the treatment site was clearly identified and confirmed by the patient. All components of Universal Protocol/PAUSE Rule completed.
Estimated Blood Loss (Cc): minimal
6 Mm Punch Excision Text: A 6 mm punch was used to make an initial incision over the lesion.  After this overlying column of skin was removed, blunt dissection was used to free the lesion from the surrounding tissues and the lesion was extirpated through the surgical opening made by the punch biopsy.
Deep Sutures: 5-0 rPGA
Purse String (Intermediate) Text: Given the location of the defect and the characteristics of the surrounding skin a purse string intermediate closure was deemed most appropriate.  Undermining was performed circumferentially around the surgical defect.  A purse string suture was then placed and tightened.
Medical Necessity Clause: This procedure was medically necessary because the lesion that was treated was:
Vermilion Border Text: The closure involved the vermilion border.
Excision Depth: dermis
Complex Repair Preamble Text (Leave Blank If You Do Not Want): Extensive wide undermining was performed.
Dressing: sterile steri-strips and sterile pressure dressing
Post-Care Instructions: I reviewed with the patient in detail post-care instructions.
4.5 Mm Punch Excision Text: A 4.5 mm punch was used to make an initial incision over the lesion.  After this overlying column of skin was removed, blunt dissection was used to free the lesion from the surrounding tissues and the lesion was extirpated through the surgical opening made by the punch biopsy.
Hemostasis: Pressure and suture ligation
Helical Rim Text: The closure involved the helical rim.
10 Mm Punch Excision Text: A 10 mm punch was used to make an initial incision over the lesion.  After this overlying column of skin was removed, blunt dissection was used to free the lesion from the surrounding tissues and the lesion was extirpated through the surgical opening made by the punch biopsy.
Repair Type: None (Simple)
Excision Method: 4 mm Punch
4 Mm Punch Excision Text: A 4 mm punch was used to make an initial incision over the lesion.  After this overlying column of skin was removed, blunt dissection was used to free the lesion from the surrounding tissues and the lesion was extirpated through the surgical opening made by the punch biopsy.
8 Mm Punch Excision Text: A 8 mm punch was used to make an initial incision over the lesion.  After this overlying column of skin was removed, blunt dissection was used to free the lesion from the surrounding tissues and the lesion was extirpated through the surgical opening made by the punch biopsy.
Detail Level: Detailed
Nostril Rim Text: The closure involved the nostril rim.
Wound Care: Vaseline
Size Of Lesion In Cm: 0.3
2 Mm Punch Excision Text: A 2 mm punch was used to make an initial incision over the lesion.  After this overlying column of skin was removed, blunt dissection was used to free the lesion from the surrounding tissues and the lesion was extirpated through the surgical opening made by the punch biopsy.

## 2022-08-15 NOTE — HPI: PROCEDURE (SKIN SURGERY)
Has The Growth Been Previously Biopsied?: has not been previously biopsied
Body Location Override (Optional): Left medial mandibular cheek

## 2022-09-01 ENCOUNTER — HOSPITAL ENCOUNTER (OUTPATIENT)
Dept: MAMMOGRAPHY | Facility: CLINIC | Age: 72
Discharge: HOME | End: 2022-09-01
Attending: INTERNAL MEDICINE | Admitting: INTERNAL MEDICINE
Payer: COMMERCIAL

## 2022-09-01 DIAGNOSIS — Z12.31 VISIT FOR SCREENING MAMMOGRAM: ICD-10-CM

## 2022-09-01 PROCEDURE — 77067 SCR MAMMO BI INCL CAD: CPT

## 2022-10-27 ENCOUNTER — OFFICE VISIT (OUTPATIENT)
Dept: FAMILY MEDICINE | Facility: CLINIC | Age: 72
End: 2022-10-27
Payer: COMMERCIAL

## 2022-10-27 VITALS
TEMPERATURE: 98.1 F | HEART RATE: 77 BPM | WEIGHT: 141.5 LBS | RESPIRATION RATE: 16 BRPM | HEIGHT: 59 IN | OXYGEN SATURATION: 99 % | DIASTOLIC BLOOD PRESSURE: 76 MMHG | SYSTOLIC BLOOD PRESSURE: 138 MMHG | BODY MASS INDEX: 28.52 KG/M2

## 2022-10-27 DIAGNOSIS — E03.9 HYPOTHYROIDISM, UNSPECIFIED TYPE: ICD-10-CM

## 2022-10-27 DIAGNOSIS — Z23 NEED FOR PROPHYLACTIC VACCINATION AND INOCULATION AGAINST INFLUENZA: ICD-10-CM

## 2022-10-27 DIAGNOSIS — Z00.00 ROUTINE GENERAL MEDICAL EXAMINATION AT A HEALTH CARE FACILITY: Primary | ICD-10-CM

## 2022-10-27 DIAGNOSIS — Z20.822 EXPOSURE TO 2019 NOVEL CORONAVIRUS: ICD-10-CM

## 2022-10-27 DIAGNOSIS — Z23 NEED FOR VACCINATION: ICD-10-CM

## 2022-10-27 DIAGNOSIS — R03.0 ELEVATED BLOOD PRESSURE READING WITHOUT DIAGNOSIS OF HYPERTENSION: ICD-10-CM

## 2022-10-27 LAB
ALBUMIN SERPL-MCNC: 4 G/DL (ref 3.4–5)
ALP SERPL-CCNC: 89 U/L (ref 40–150)
ALT SERPL W P-5'-P-CCNC: 24 U/L (ref 0–50)
ANION GAP SERPL CALCULATED.3IONS-SCNC: 8 MMOL/L (ref 3–14)
AST SERPL W P-5'-P-CCNC: 19 U/L (ref 0–45)
BILIRUB SERPL-MCNC: 0.4 MG/DL (ref 0.2–1.3)
BUN SERPL-MCNC: 8 MG/DL (ref 7–30)
CALCIUM SERPL-MCNC: 9.4 MG/DL (ref 8.5–10.1)
CHLORIDE BLD-SCNC: 109 MMOL/L (ref 94–109)
CHOLEST SERPL-MCNC: 185 MG/DL
CO2 SERPL-SCNC: 23 MMOL/L (ref 20–32)
CREAT SERPL-MCNC: 0.63 MG/DL (ref 0.52–1.04)
ERYTHROCYTE [DISTWIDTH] IN BLOOD BY AUTOMATED COUNT: 12.6 % (ref 10–15)
FASTING STATUS PATIENT QL REPORTED: YES
GFR SERPL CREATININE-BSD FRML MDRD: >90 ML/MIN/1.73M2
GLUCOSE BLD-MCNC: 95 MG/DL (ref 70–99)
HCT VFR BLD AUTO: 42.9 % (ref 35–47)
HDLC SERPL-MCNC: 76 MG/DL
HGB BLD-MCNC: 14.1 G/DL (ref 11.7–15.7)
LDLC SERPL CALC-MCNC: 98 MG/DL
MCH RBC QN AUTO: 29.1 PG (ref 26.5–33)
MCHC RBC AUTO-ENTMCNC: 32.9 G/DL (ref 31.5–36.5)
MCV RBC AUTO: 89 FL (ref 78–100)
NONHDLC SERPL-MCNC: 109 MG/DL
PLATELET # BLD AUTO: 391 10E3/UL (ref 150–450)
POTASSIUM BLD-SCNC: 4.4 MMOL/L (ref 3.4–5.3)
PROT SERPL-MCNC: 8.2 G/DL (ref 6.8–8.8)
RBC # BLD AUTO: 4.84 10E6/UL (ref 3.8–5.2)
SARS-COV-2 RNA RESP QL NAA+PROBE: NEGATIVE
SODIUM SERPL-SCNC: 140 MMOL/L (ref 133–144)
TRIGL SERPL-MCNC: 53 MG/DL
TSH SERPL DL<=0.005 MIU/L-ACNC: 0.24 MU/L (ref 0.4–4)
WBC # BLD AUTO: 7.8 10E3/UL (ref 4–11)

## 2022-10-27 PROCEDURE — 80061 LIPID PANEL: CPT | Performed by: INTERNAL MEDICINE

## 2022-10-27 PROCEDURE — 90714 TD VACC NO PRESV 7 YRS+ IM: CPT | Performed by: INTERNAL MEDICINE

## 2022-10-27 PROCEDURE — 90472 IMMUNIZATION ADMIN EACH ADD: CPT | Performed by: INTERNAL MEDICINE

## 2022-10-27 PROCEDURE — 80053 COMPREHEN METABOLIC PANEL: CPT | Performed by: INTERNAL MEDICINE

## 2022-10-27 PROCEDURE — G0439 PPPS, SUBSEQ VISIT: HCPCS | Performed by: INTERNAL MEDICINE

## 2022-10-27 PROCEDURE — 84443 ASSAY THYROID STIM HORMONE: CPT | Performed by: INTERNAL MEDICINE

## 2022-10-27 PROCEDURE — 36415 COLL VENOUS BLD VENIPUNCTURE: CPT | Performed by: INTERNAL MEDICINE

## 2022-10-27 PROCEDURE — 90662 IIV NO PRSV INCREASED AG IM: CPT | Performed by: INTERNAL MEDICINE

## 2022-10-27 PROCEDURE — 85027 COMPLETE CBC AUTOMATED: CPT | Performed by: INTERNAL MEDICINE

## 2022-10-27 PROCEDURE — U0003 INFECTIOUS AGENT DETECTION BY NUCLEIC ACID (DNA OR RNA); SEVERE ACUTE RESPIRATORY SYNDROME CORONAVIRUS 2 (SARS-COV-2) (CORONAVIRUS DISEASE [COVID-19]), AMPLIFIED PROBE TECHNIQUE, MAKING USE OF HIGH THROUGHPUT TECHNOLOGIES AS DESCRIBED BY CMS-2020-01-R: HCPCS | Performed by: INTERNAL MEDICINE

## 2022-10-27 PROCEDURE — U0005 INFEC AGEN DETEC AMPLI PROBE: HCPCS | Performed by: INTERNAL MEDICINE

## 2022-10-27 RX ORDER — LEVOTHYROXINE SODIUM 100 UG/1
100 TABLET ORAL DAILY
Qty: 90 TABLET | Refills: 3 | Status: SHIPPED | OUTPATIENT
Start: 2022-10-27 | End: 2023-10-30

## 2022-10-27 ASSESSMENT — ENCOUNTER SYMPTOMS
DYSURIA: 0
JOINT SWELLING: 0
FEVER: 0
DIZZINESS: 0
FREQUENCY: 0
HEADACHES: 0
ARTHRALGIAS: 0
NAUSEA: 0
HEMATOCHEZIA: 0
HEMATURIA: 0
DIARRHEA: 0
HEARTBURN: 0
MYALGIAS: 0
SORE THROAT: 0
PALPITATIONS: 0
PARESTHESIAS: 0
NERVOUS/ANXIOUS: 0
WEAKNESS: 0
CHILLS: 0
ABDOMINAL PAIN: 0
EYE PAIN: 0
SHORTNESS OF BREATH: 0
COUGH: 0
CONSTIPATION: 0

## 2022-10-27 ASSESSMENT — ACTIVITIES OF DAILY LIVING (ADL): CURRENT_FUNCTION: NO ASSISTANCE NEEDED

## 2022-10-27 ASSESSMENT — PAIN SCALES - GENERAL: PAINLEVEL: NO PAIN (0)

## 2022-10-27 NOTE — PROGRESS NOTES
"SUBJECTIVE:   Keyonna Yoder is a 72 year old who presents for Preventive Visit.      Patient has been advised of split billing requirements and indicates understanding: Yes  Are you in the first 12 months of your Medicare coverage?  { :353307::\"No\"}    HPI  Do you feel safe in your environment? { :843237}    Have you ever done Advance Care Planning? (For example, a Health Directive, POLST, or a discussion with a medical provider or your loved ones about your wishes): { :742557}    {Hearing Test Done (Optional):348939}   Fall risk  { :440232}  {If any of the above assessments are answered yes, consider ordering appropriate referrals (Optional):833885::\"click delete button to remove this line now\"}  Cognitive Screening   1) Repeat 3 items (Leader, Season, Table)    2) Clock draw: { :13071::\"NORMAL\"}  3) 3 item recall: {Say: \"What were the three words I asked you to remember?\"}{ :814221}  Results: {MINICOG RESULTS:891502}    Mini-CogTM Copyright S Sul[employer]. Licensed by the author for use in [facility]; reprinted with permission (soob@[email or web address]). All rights reserved.      {Do you have sleep apnea, excessive snoring or daytime drowsiness? (Optional):067967}    Reviewed and updated as needed this visit by clinical staff                  Reviewed and updated as needed this visit by Provider                 Social History     Tobacco Use     Smoking status: Never     Smokeless tobacco: Never   Substance Use Topics     Alcohol use: Yes     Alcohol/week: 0.0 standard drinks     Comment: 2 drinks per week     {Rooming Staff- Complete this question if Prescreen response is not shown below for today's visit. If you drink alcohol do you typically have >3 drinks per day or >7 drinks per week? (Optional):329192}    Alcohol Use 10/25/2021   Prescreen: >3 drinks/day or >7 drinks/week? No   Prescreen: >3 drinks/day or >7 drinks/week? -   {add AUDIT responses (Optional) (A score of 7 for adult men is an indication of hazardous " "drinking; a score of 8 or more is an indication of an alcohol use disorder.  A score of 7 or more for adult women is an indication of hazardous drinking or an alchohol use disorder):803985}    {Outside tests to abstract? :859486}    {additional problems to add (Optional):323947}    Current providers sharing in care for this patient include: {Rooming staff:  Please update Care Team in Rooming Activity, refresh this note and then delete this statement}  Patient Care Team:  Yosef Mccabe MD as PCP - General (Internal Medicine)  Yosef Mccabe MD as Assigned PCP  Linda Kapoor MD as Assigned OBGYN Provider    The following health maintenance items are reviewed in Epic and correct as of today:  Health Maintenance   Topic Date Due     HEPATITIS B IMMUNIZATION (1 of 3 - 3-dose series) Never done     DTAP/TDAP/TD IMMUNIZATION (2 - Td or Tdap) 02/09/2022     COVID-19 Vaccine (5 - Booster for Pfizer series) 07/21/2022     INFLUENZA VACCINE (1) 09/01/2022     ANNUAL REVIEW OF HM ORDERS  10/25/2022     MEDICARE ANNUAL WELLNESS VISIT  10/25/2022     TSH W/FREE T4 REFLEX  12/13/2022     FALL RISK ASSESSMENT  05/26/2023     MAMMO SCREENING  09/01/2024     LIPID  10/25/2026     ADVANCE CARE PLANNING  10/25/2026     COLORECTAL CANCER SCREENING  06/24/2029     DEXA  08/03/2030     HEPATITIS C SCREENING  Completed     PHQ-2 (once per calendar year)  Completed     Pneumococcal Vaccine: 65+ Years  Completed     ZOSTER IMMUNIZATION  Completed     IPV IMMUNIZATION  Aged Out     MENINGITIS IMMUNIZATION  Aged Out     {Chronicprobdata (optional):673990}  {Decision Support (Optional):095837}        Review of Systems  {ROS COMP (Optional):060626}    OBJECTIVE:   There were no vitals taken for this visit. Estimated body mass index is 28.68 kg/m  as calculated from the following:    Height as of 5/26/22: 1.499 m (4' 11\").    Weight as of 5/26/22: 64.4 kg (142 lb).  Physical Exam  {Exam (Optional) :594887}    {Diagnostic Test Results " "(Optional):782197::\"Diagnostic Test Results:\",\"Labs reviewed in Epic\"}    ASSESSMENT / PLAN:   {Diag Picklist:393554}    {Patient advised of split billing (Optional):419589}      COUNSELING:  {Medicare Counselin}    Estimated body mass index is 28.68 kg/m  as calculated from the following:    Height as of 22: 1.499 m (4' 11\").    Weight as of 22: 64.4 kg (142 lb).    {Weight Management Plan (ACO) Complete if BMI is abnormal-  Ages 18-64  BMI >24.9.  Age 65+ with BMI <23 or >30 (Optional):176982}    She reports that she has never smoked. She has never used smokeless tobacco.      Appropriate preventive services were discussed with this patient, including applicable screening as appropriate for cardiovascular disease, diabetes, osteopenia/osteoporosis, and glaucoma.  As appropriate for age/gender, discussed screening for colorectal cancer, prostate cancer, breast cancer, and cervical cancer. Checklist reviewing preventive services available has been given to the patient.    Reviewed patients plan of care and provided an AVS. The {CarePlan:288788} for Keyonna meets the Care Plan requirement. This Care Plan has been established and reviewed with the {PATIENT, FAMILY MEMBER, CAREGIVER:894783}.    Counseling Resources:  ATP IV Guidelines  Pooled Cohorts Equation Calculator  Breast Cancer Risk Calculator  Breast Cancer: Medication to Reduce Risk  FRAX Risk Assessment  ICSI Preventive Guidelines  Dietary Guidelines for Americans,   [employer]'s MyPlate  ASA Prophylaxis  Lung CA Screening    Yosef Mccabe MD  [facility]    Identified Health Risks:  "

## 2022-10-27 NOTE — PROGRESS NOTES
"SUBJECTIVE:   Keyonna Yoder is a 72 year old who presents for Preventive Visit.      Patient has been advised of split billing requirements and indicates understanding: Yes  Are you in the first 12 months of your Medicare coverage?  No    Healthy Habits:     In general, how would you rate your overall health?  Excellent    Do you usually eat at least 4 servings of fruit and vegetables a day, include whole grains    & fiber and avoid regularly eating high fat or \"junk\" foods?  Yes    Ability to successfully perform activities of daily living:  No assistance needed    Home Safety:  No safety concerns identified    Hearing Impairment:  Feel that people are mumbling or not speaking clearly    In the past 6 months, have you been bothered by leaking of urine?  No    In general, how would you rate your overall mental or emotional health?  Good      PHQ-2 Total Score: 0    Additional concerns today:  No    COVID exposure at work on tues   Do you feel safe in your environment? Yes    Have you ever done Advance Care Planning? (For example, a Health Directive, POLST, or a discussion with a medical provider or your loved ones about your wishes): Yes, advance care planning is on file.       Fall risk  Fallen 2 or more times in the past year?: No  Any fall with injury in the past year?: No    Cognitive Screening   1) Repeat 3 items (Leader, Season, Table)    2) Clock draw: NORMAL  3) 3 item recall: Recalls 3 objects  Results: 3 items recalled: COGNITIVE IMPAIRMENT LESS LIKELY    Mini-CogTM Copyright GUIDO Ozuna. Licensed by the author for use in [facility]; reprinted with permission ([email or web address]). All rights reserved.      Do you have sleep apnea, excessive snoring or daytime drowsiness?: no    Reviewed and updated as needed this visit by clinical staff   Tobacco  Allergies    Med Hx  Surg Hx  Fam Hx          Reviewed and updated as needed this visit by Provider   Tobacco     Med Hx  Surg Hx  Fam Hx         Social " History     Tobacco Use     Smoking status: Never     Smokeless tobacco: Never   Substance Use Topics     Alcohol use: Yes     Alcohol/week: 0.0 standard drinks     Comment: 2 drinks per week     If you drink alcohol do you typically have >3 drinks per day or >7 drinks per week? No    Alcohol Use 10/27/2022   Prescreen: >3 drinks/day or >7 drinks/week? No   Prescreen: >3 drinks/day or >7 drinks/week? -               Current providers sharing in care for this patient include:   Patient Care Team:  Yosef Mccabe MD as PCP - General (Internal Medicine)  Yosef Mccabe MD as Assigned PCP  Linda Kapoor MD as Assigned OBGYN Provider    The following health maintenance items are reviewed in Epic and correct as of today:  Health Maintenance   Topic Date Due     HEPATITIS B IMMUNIZATION (1 of 3 - 3-dose series) Never done     DTAP/TDAP/TD IMMUNIZATION (2 - Td or Tdap) 02/09/2022     COVID-19 Vaccine (5 - Booster for Pfizer series) 07/21/2022     INFLUENZA VACCINE (1) 09/01/2022     ANNUAL REVIEW OF HM ORDERS  10/25/2022     MEDICARE ANNUAL WELLNESS VISIT  10/25/2022     TSH W/FREE T4 REFLEX  12/13/2022     FALL RISK ASSESSMENT  10/27/2023     MAMMO SCREENING  09/01/2024     LIPID  10/25/2026     ADVANCE CARE PLANNING  10/27/2027     COLORECTAL CANCER SCREENING  06/24/2029     DEXA  08/03/2030     HEPATITIS C SCREENING  Completed     PHQ-2 (once per calendar year)  Completed     Pneumococcal Vaccine: 65+ Years  Completed     ZOSTER IMMUNIZATION  Completed     IPV IMMUNIZATION  Aged Out     MENINGITIS IMMUNIZATION  Aged Out     Patient Active Problem List   Diagnosis     Advanced directives, counseling/discussion     Hypothyroidism     CARDIOVASCULAR SCREENING; LDL GOAL LESS THAN 130     GERD (gastroesophageal reflux disease)     Osteopenia     Cholecystitis     Lumbago     Chronic left shoulder pain     Past Surgical History:   Procedure Laterality Date     COLONOSCOPY       LAPAROSCOPIC CHOLECYSTECTOMY  4/24/2013     Procedure: LAPAROSCOPIC CHOLECYSTECTOMY;  Laparoscopic cholecystectomy.;  Surgeon: Bong Pyle MD;  Location:  OR       Social History     Tobacco Use     Smoking status: Never     Smokeless tobacco: Never   Substance Use Topics     Alcohol use: Yes     Alcohol/week: 0.0 standard drinks     Comment: 2 drinks per week     Family History   Problem Relation Age of Onset     C.A.D. Mother      Cerebrovascular Disease Father          Current Outpatient Medications   Medication Sig Dispense Refill     levothyroxine (SYNTHROID/LEVOTHROID) 100 MCG tablet Take 1 tablet (100 mcg) by mouth daily 90 tablet 3     acetaminophen (TYLENOL) 500 MG tablet Take 1,000 mg by mouth every 8 hours as needed for mild pain       calcium-vitamin D 500-125 MG-UNIT TABS Take 1 tablet by mouth 2 times daily (Patient not taking: Reported on 5/26/2022)       Famotidine (PEPCID PO) Every morning       ferrous fumarate 65 mg, [place]. FE,-Vitamin C 125 mg (VITRON C)  MG TABS tablet Take 1 tablet by mouth daily (Patient not taking: No sig reported)       Allergies   Allergen Reactions     External Allergen Needs Reconciliation - See Comment      Please reconcile the Patient's allergy reported as ASPIRINDARVON   and update accordingly     Lasix [Furosemide]          Review of Systems   Constitutional: Negative for chills and fever.   HENT: Negative for congestion, ear pain, hearing loss and sore throat.    Eyes: Negative for pain and visual disturbance.   Respiratory: Negative for cough and shortness of breath.    Cardiovascular: Negative for chest pain, palpitations and peripheral edema.   Gastrointestinal: Negative for abdominal pain, constipation, diarrhea, heartburn, hematochezia and nausea.   Genitourinary: Negative for dysuria, frequency, genital sores, hematuria and urgency.   Musculoskeletal: Negative for arthralgias, joint swelling and myalgias.   Skin: Negative for rash.   Neurological: Negative for dizziness, weakness,  "headaches and paresthesias.   Psychiatric/Behavioral: Negative for mood changes. The patient is not nervous/anxious.        OBJECTIVE:   BP (!) 153/81 (BP Location: Right arm, Cuff Size: Adult Large)   Pulse 77   Temp 98.1  F (36.7  C) (Oral)   Resp 16   Ht 1.486 m (4' 10.5\")   Wt 64.2 kg (141 lb 8 oz)   SpO2 99%   BMI 29.07 kg/m   Estimated body mass index is 29.07 kg/m  as calculated from the following:    Height as of this encounter: 1.486 m (4' 10.5\").    Weight as of this encounter: 64.2 kg (141 lb 8 oz).  Physical Exam  GENERAL APPEARANCE: healthy, alert and no distress  EYES: Eyes grossly normal to inspection, PERRL and conjunctivae and sclerae normal  HENT: ear canals and TM's normal, nose and mouth without ulcers or lesions, oropharynx clear and oral mucous membranes moist  NECK: no adenopathy, no asymmetry, masses, or scars and thyroid normal to palpation  RESP: lungs clear to auscultation - no rales, rhonchi or wheezes  CV: regular rate and rhythm, normal S1 S2, no S3 or S4, no murmur, click or rub, no peripheral edema and peripheral pulses strong  ABDOMEN: soft, nontender, no hepatosplenomegaly, no masses and bowel sounds normal  MS: no musculoskeletal defects are noted and gait is age appropriate without ataxia  SKIN: no suspicious lesions or rashes  NEURO: Normal strength and tone, sensory exam grossly normal, mentation intact and speech normal  PSYCH: mentation appears normal and affect normal/bright    Labs pending     ASSESSMENT / PLAN:       ICD-10-CM    1. Routine general medical examination at a health care facility  Z00.00 Comprehensive metabolic panel     CBC with platelets     Lipid panel reflex to direct LDL Fasting      2. Exposure to 2019 novel coronavirus  Z20.822       3. Elevated blood pressure reading without diagnosis of hypertension  R03.0       4. Hypothyroidism, unspecified type  E03.9 levothyroxine (SYNTHROID/LEVOTHROID) 100 MCG tablet     TSH      Check COVID PCR, hold off " "on booster until we know result; if negative, recommend booster at pharmacy  Check home blood pressure readings, see patient instructions, counseling provided, increase physical activity counseling provided too  Recheck TFTs    Patient has been advised of split billing requirements and indicates understanding: Yes      COUNSELING:  Reviewed preventive health counseling, as reflected in patient instructions  Special attention given to:       Regular exercise       Healthy diet/nutrition       Immunizations      COVID booster, influenza shot, Td recommended        Consider lung cancer screening for ages 55-80 years (77 for Medicare) and 20 pack-year smoking history  ;  Never smoker        Hepatitis C screening       HIV screening for high risk patient       Mammogram: This was done in September   Colonoscopy normal in 2019; could recheck in 2029 pending health state     Estimated body mass index is 29.07 kg/m  as calculated from the following:    Height as of this encounter: 1.486 m (4' 10.5\").    Weight as of this encounter: 64.2 kg (141 lb 8 oz).    Weight management plan: Discussed healthy diet and exercise guidelines    She reports that she has never smoked. She has never used smokeless tobacco.      Appropriate preventive services were discussed with this patient, including applicable screening as appropriate for cardiovascular disease, diabetes, osteopenia/osteoporosis, and glaucoma.  As appropriate for age/gender, discussed screening for colorectal cancer, prostate cancer, breast cancer, and cervical cancer. Checklist reviewing preventive services available has been given to the patient.    Reviewed patients plan of care and provided an AVS. The Basic Care Plan (routine screening as documented in Health Maintenance) for Keyonna meets the Care Plan requirement. This Care Plan has been established and reviewed with the Patient.    Counseling Resources:  ATP IV Guidelines  Pooled Cohorts Equation Calculator  Breast " Cancer Risk Calculator  Breast Cancer: Medication to Reduce Risk  FRAX Risk Assessment  ICSI Preventive Guidelines  Dietary Guidelines for Americans, 2010  [email or web address]'s MyPlate  ASA Prophylaxis  Lung CA Screening    Yosef Mccabe MD  [facility]    Identified Health Risks:

## 2022-10-27 NOTE — NURSING NOTE
Prior to immunization administration, verified patients identity using patient s name and date of birth. Please see Immunization Activity for additional information.     Screening Questionnaire for Adult Immunization    Are you sick today?   No   Do you have allergies to medications, food, a vaccine component or latex?   No   Have you ever had a serious reaction after receiving a vaccination?   No   Do you have a long-term health problem with heart, lung, kidney, or metabolic disease (e.g., diabetes), asthma, a blood disorder, no spleen, complement component deficiency, a cochlear implant, or a spinal fluid leak?  Are you on long-term aspirin therapy?   No   Do you have cancer, leukemia, HIV/AIDS, or any other immune system problem?   No   Do you have a parent, brother, or sister with an immune system problem?   No   In the past 3 months, have you taken medications that affect  your immune system, such as prednisone, other steroids, or anticancer drugs; drugs for the treatment of rheumatoid arthritis, Crohn s disease, or psoriasis; or have you had radiation treatments?   No   Have you had a seizure, or a brain or other nervous system problem?   No   During the past year, have you received a transfusion of blood or blood    products, or been given immune (gamma) globulin or antiviral drug?   No   For women: Are you pregnant or is there a chance you could become       pregnant during the next month?   No   Have you received any vaccinations in the past 4 weeks?   No     Immunization questionnaire answers were all negative.        Per orders of Dr. Mccabe, injection of TD given by Mehul Santos MA. Patient instructed to remain in clinic for 15 minutes afterwards, and to report any adverse reaction to me immediately.       Screening performed by Mehul Santos MA on 10/27/2022 at 10:39 AM.

## 2022-10-27 NOTE — PATIENT INSTRUCTIONS
Make sure your home blood pressure cuff  checks your blood pressure on your arm not  your wrist.  Omron is a good brand.    Take your blood pressure after at least 5 minutes of rest in the AM and PM for one week and record the  readings (14 total readings).    Send me a Pongr message with the average of your top readings (Systolic blood pressures) and the average of your bottom readings (Diastolic blood pressures).  Our goal is for this average to be less than 140/90.

## 2022-10-27 NOTE — LETTER
October 28, 2022      Keyonna De La Garza  [address] [place] [facility] [address]        Dear ,    We are writing to inform you of your test results.      Good news!  The COVID test is negative.  You should go get your booster when you can!       Resulted Orders   Asymptomatic COVID-19 Virus (Coronavirus) by PCR Nasopharyngeal   Result Value Ref Range    SARS CoV2 PCR Negative Negative      Comment:      NEGATIVE: SARS-CoV-2 (COVID-19) RNA not detected, presumed negative.    Narrative    Testing was performed using the Aptima SARS-CoV-2 Assay on the  [employer] Instrument System. Additional information about this  Emergency Use Authorization (EUA) assay can be found via the Lab  Guide. This test should be ordered for the detection of SARS-CoV-2 in  individuals who meet SARS-CoV-2 clinical and/or epidemiological  criteria. Test performance is unknown in asymptomatic patients. This  test is for in vitro diagnostic use under the FDA EUA for  laboratories certified under CLIA to perform high complexity testing.  This test has not been FDA cleared or approved. A negative result  does not rule out the presence of PCR inhibitors in the specimen or  target RNA in concentration below the limit of detection for the  assay. The possibility of a false negative should be considered if  the patient's recent exposure or clinical presentation suggests  COVID-19. This test was validated by the [facility]. This laboratory is certified under  the Clinical Laboratory Improvement Amendments of 1988 (CLIA-88) as  qualified to perform high complexity laboratory testing.   Comprehensive metabolic panel   Result Value Ref Range    Sodium 140 133 - 144 mmol/L    Potassium 4.4 3.4 - 5.3 mmol/L    Chloride 109 94 - 109 mmol/L    Carbon Dioxide (CO2) 23 20 - 32 mmol/L    Anion Gap 8 3 - 14 mmol/L    Urea Nitrogen 8 7 - 30 mg/dL    Creatinine 0.63 0.52 - 1.04 mg/dL    Calcium 9.4  8.5 - 10.1 mg/dL    Glucose 95 70 - 99 mg/dL    Alkaline Phosphatase 89 40 - 150 U/L    AST 19 0 - 45 U/L    ALT 24 0 - 50 U/L    Protein Total 8.2 6.8 - 8.8 g/dL    Albumin 4.0 3.4 - 5.0 g/dL    Bilirubin Total 0.4 0.2 - 1.3 mg/dL    GFR Estimate >90 >60 mL/min/1.73m2      Comment:      Effective December 21, 2021 eGFRcr in adults is calculated using the 2021 CKD-EPI creatinine equation which includes age and gender (Marlene madrid al., [place], DOI: 10.1056/IDNVkm9943234)   CBC with platelets   Result Value Ref Range    WBC Count 7.8 4.0 - 11.0 10e3/uL    RBC Count 4.84 3.80 - 5.20 10e6/uL    Hemoglobin 14.1 11.7 - 15.7 g/dL    Hematocrit 42.9 35.0 - 47.0 %    MCV 89 78 - 100 fL    MCH 29.1 26.5 - 33.0 pg    MCHC 32.9 31.5 - 36.5 g/dL    RDW 12.6 10.0 - 15.0 %    Platelet Count 391 150 - 450 10e3/uL   Lipid panel reflex to direct LDL Fasting   Result Value Ref Range    Cholesterol 185 <200 mg/dL    Triglycerides 53 <150 mg/dL    Direct Measure HDL 76 >=50 mg/dL    LDL Cholesterol Calculated 98 <=100 mg/dL    Non HDL Cholesterol 109 <130 mg/dL    Patient Fasting > 8hrs? Yes     Narrative    Cholesterol  Desirable:  <200 mg/dL    Triglycerides  Normal:  Less than 150 mg/dL  Borderline High:  150-199 mg/dL  High:  200-499 mg/dL  Very High:  Greater than or equal to 500 mg/dL    Direct Measure HDL  Female:  Greater than or equal to 50 mg/dL   Male:  Greater than or equal to 40 mg/dL    LDL Cholesterol  Desirable:  <100mg/dL  Above Desirable:  100-129 mg/dL   Borderline High:  130-159 mg/dL   High:  160-189 mg/dL   Very High:  >= 190 mg/dL    Non HDL Cholesterol  Desirable:  130 mg/dL  Above Desirable:  130-159 mg/dL  Borderline High:  160-189 mg/dL  High:  190-219 mg/dL  Very High:  Greater than or equal to 220 mg/dL   TSH   Result Value Ref Range    TSH 0.24 (L) 0.40 - 4.00 mU/L       If you have any questions or concerns, please call the clinic at the number listed above.       Sincerely,      Yosef Mccabe MD

## 2022-10-28 NOTE — RESULT ENCOUNTER NOTE
The following letter pertains to your most recent diagnostic tests:    Good news!  The COVID test is negative.  You should go get your booster when you can!      Sincerely,    Dr. Mccabe

## 2022-11-28 ENCOUNTER — HOSPITAL ENCOUNTER (OUTPATIENT)
Dept: MAMMOGRAPHY | Age: 72
Discharge: HOME | End: 2022-11-28
Attending: INTERNAL MEDICINE

## 2022-11-28 DIAGNOSIS — Z12.31 ENCOUNTER FOR SCREENING MAMMOGRAM FOR MALIGNANT NEOPLASM OF BREAST: ICD-10-CM

## 2022-11-28 PROCEDURE — 77063 BREAST TOMOSYNTHESIS BI: CPT

## 2023-08-21 ENCOUNTER — ANCILLARY ORDERS (OUTPATIENT)
Dept: MAMMOGRAPHY | Facility: HOSPITAL | Age: 73
End: 2023-08-21

## 2023-08-21 DIAGNOSIS — Z12.31 VISIT FOR SCREENING MAMMOGRAM: Primary | ICD-10-CM

## 2023-09-06 ENCOUNTER — HOSPITAL ENCOUNTER (OUTPATIENT)
Dept: MAMMOGRAPHY | Facility: CLINIC | Age: 73
Discharge: HOME | End: 2023-09-06
Attending: INTERNAL MEDICINE | Admitting: INTERNAL MEDICINE
Payer: COMMERCIAL

## 2023-09-06 DIAGNOSIS — Z12.31 VISIT FOR SCREENING MAMMOGRAM: ICD-10-CM

## 2023-09-06 PROCEDURE — 77067 SCR MAMMO BI INCL CAD: CPT

## 2023-10-04 ENCOUNTER — OFFICE VISIT (OUTPATIENT)
Dept: FAMILY MEDICINE | Facility: CLINIC | Age: 73
End: 2023-10-04
Payer: COMMERCIAL

## 2023-10-04 VITALS
HEART RATE: 71 BPM | RESPIRATION RATE: 20 BRPM | OXYGEN SATURATION: 96 % | BODY MASS INDEX: 29.17 KG/M2 | TEMPERATURE: 97.7 F | SYSTOLIC BLOOD PRESSURE: 133 MMHG | DIASTOLIC BLOOD PRESSURE: 77 MMHG | WEIGHT: 142 LBS

## 2023-10-04 DIAGNOSIS — M79.18 MUSCULOSKELETAL PAIN: ICD-10-CM

## 2023-10-04 DIAGNOSIS — Z01.818 PREOP GENERAL PHYSICAL EXAM: Primary | ICD-10-CM

## 2023-10-04 DIAGNOSIS — E03.9 HYPOTHYROIDISM, UNSPECIFIED TYPE: ICD-10-CM

## 2023-10-04 DIAGNOSIS — H26.9 CATARACT OF BOTH EYES, UNSPECIFIED CATARACT TYPE: ICD-10-CM

## 2023-10-04 PROCEDURE — 99214 OFFICE O/P EST MOD 30 MIN: CPT | Performed by: INTERNAL MEDICINE

## 2023-10-04 RX ORDER — DULOXETIN HYDROCHLORIDE 30 MG/1
CAPSULE, DELAYED RELEASE ORAL
Qty: 90 CAPSULE | Refills: 3 | Status: SHIPPED | OUTPATIENT
Start: 2023-10-04 | End: 2024-01-15

## 2023-10-04 ASSESSMENT — PATIENT HEALTH QUESTIONNAIRE - PHQ9
10. IF YOU CHECKED OFF ANY PROBLEMS, HOW DIFFICULT HAVE THESE PROBLEMS MADE IT FOR YOU TO DO YOUR WORK, TAKE CARE OF THINGS AT HOME, OR GET ALONG WITH OTHER PEOPLE: SOMEWHAT DIFFICULT
SUM OF ALL RESPONSES TO PHQ QUESTIONS 1-9: 9
SUM OF ALL RESPONSES TO PHQ QUESTIONS 1-9: 9

## 2023-10-04 NOTE — PROGRESS NOTES
[address], [address]  [facility] [address]  Phone: [phone]  Primary Provider: Cheryl Feliciano  Pre-op Performing Provider: CHERYL FELICIANO      PREOPERATIVE EVALUATION:  Today's date: 10/4/2023    Keyonna Yoder is a 73 year old female who presents for a preoperative evaluation.      Surgical Information:  Surgery/Procedure: Cstaract Surgery  Surgery Location: [facility]   Surgeon:  Mehran Stewart MD  Surgery Date: 10/10/2023  Time of Surgery: TBD  Where patient plans to recover: At home with family  Fax number for surgical facility: [phone]    Assessment & Plan     The proposed surgical procedure is considered LOW risk.    Preop general physical exam  Suitable candidate for cataract procedures    Cataract of both eyes, unspecified cataract type      Musculoskeletal pain  Start duloxetine, side effects and risks discussed, follow up in Jan to assess therapy   - DULoxetine (CYMBALTA) 30 MG capsule; One capsule once daily for one week, then increase to two capsules daily    Hypothyroidism, unspecified type  Recheck TFTs at schedule Zach preventive visits            - No identified additional risk factors other than previously addressed    Antiplatelet or Anticoagulation Medication Instructions:   - Patient is on no antiplatelet or anticoagulation medications.    Additional Medication Instructions:   - SSRIs, SNRIs, TCAs, Antipsychotics: Continue without modification.     RECOMMENDATION:  APPROVAL GIVEN to proceed with proposed procedure, without further diagnostic evaluation.      No LOS data to display   Time spent by me doing chart review, history and exam, documentation and further activities per the note    Flu shot, she prefers to get at work, [facility]    Subjective       HPI related to upcoming procedure: Desires cataract surgery.  Also complains of diffuse joint and muscle pains symmetric present above and below waist.  Not a  new problem, but worse over last few months.  Describes increased stress at work too and asks, 'do you have a crabbiness pill too?'.  Discussed duloxetine for diffuse msk pain in past.            10/4/2023     8:31 AM   Preop Questions   1. Have you ever had a heart attack or stroke? No   2. Have you ever had surgery on your heart or blood vessels, such as a stent placement, a coronary artery bypass, or surgery on an artery in your head, neck, heart, or legs? No   3. Do you have chest pain with activity? No   4. Do you have a history of  heart failure? No   5. Do you currently have a cold, bronchitis or symptoms of other infection? No   6. Do you have a cough, shortness of breath, or wheezing? YES - worse allergies this summer    7. Do you or anyone in your family have previous history of blood clots? No   8. Do you or does anyone in your family have a serious bleeding problem such as prolonged bleeding following surgeries or cuts? No   9. Have you ever had problems with anemia or been told to take iron pills? No   10. Have you had any abnormal blood loss such as black, tarry or bloody stools, or abnormal vaginal bleeding? No   11. Have you ever had a blood transfusion? No   12. Are you willing to have a blood transfusion if it is medically needed before, during, or after your surgery? Yes   13. Have you or any of your relatives ever had problems with anesthesia? No   14. Do you have sleep apnea, excessive snoring or daytime drowsiness? No   15. Do you have any artifical heart valves or other implanted medical devices like a pacemaker, defibrillator, or continuous glucose monitor? No   16. Do you have artificial joints? No   17. Are you allergic to latex? No     Health Care Directive:  Patient has a Health Care Directive on file      Review of Systems  Constitutional, neuro, ENT, endocrine, pulmonary, cardiac, gastrointestinal, genitourinary, musculoskeletal, integument and psychiatric systems are negative, except  as otherwise noted.    Patient Active Problem List    Diagnosis Date Noted    Chronic left shoulder pain 06/06/2022     Priority: Medium    Lumbago 08/11/2017     Priority: Medium    Cholecystitis 04/23/2013     Priority: Medium    Osteopenia 12/28/2012     Priority: Medium    Hypothyroidism 02/09/2012     Priority: Medium    CARDIOVASCULAR SCREENING; LDL GOAL LESS THAN 130 02/09/2012     Priority: Medium    GERD (gastroesophageal reflux disease) 02/09/2012     Priority: Medium    Advanced directives, counseling/discussion 02/08/2012     Priority: Medium     Advance Directive Problem List Overview:   Name Relationship Phone    Primary Health Care Agent            Alternative Health Care Agent          Discussed advance care planning with patient; information given to patient to review. 2/8/2012           Past Medical History:   Diagnosis Date    Abdominal pain, other specified site 2013    RUQ    Gallstones 2013    GERD (gastroesophageal reflux disease)     Hypothyroidism      Past Surgical History:   Procedure Laterality Date    COLONOSCOPY      LAPAROSCOPIC CHOLECYSTECTOMY  4/24/2013    Procedure: LAPAROSCOPIC CHOLECYSTECTOMY;  Laparoscopic cholecystectomy.;  Surgeon: Bong Pyle MD;  Location:  OR     Current Outpatient Medications   Medication Sig Dispense Refill    acetaminophen (TYLENOL) 500 MG tablet Take 1,000 mg by mouth every 8 hours as needed for mild pain      Famotidine (PEPCID PO) Every morning      levothyroxine (SYNTHROID/LEVOTHROID) 100 MCG tablet Take 1 tablet (100 mcg) by mouth daily 90 tablet 3       Allergies   Allergen Reactions    External Allergen Needs Reconciliation - See Comment      Please reconcile the Patient's allergy reported as ASPIRINDARVON   and update accordingly    Lasix [Furosemide]         Social History     Tobacco Use    Smoking status: Never    Smokeless tobacco: Never   Substance Use Topics    Alcohol use: Yes     Alcohol/week: 0.0 standard drinks of alcohol      Comment: 2 drinks per week     Family History   Problem Relation Age of Onset    C.A.D. Mother     Cerebrovascular Disease Father      History   Drug Use No         Objective     There were no vitals taken for this visit.    Physical Exam    GENERAL APPEARANCE: healthy, alert and no distress     EYES: EOMI, PERRL     HENT: ear canals and TM's normal and nose and mouth without ulcers or lesions     NECK: no adenopathy, no asymmetry, masses, or scars and thyroid normal to palpation     RESP: lungs clear to auscultation - no rales, rhonchi or wheezes     CV: regular rates and rhythm, normal S1 S2, no S3 or S4 and no murmur, click or rub     ABDOMEN:  soft, nontender, no HSM or masses and bowel sounds normal     MS: extremities normal- no gross deformities noted, no evidence of inflammation in joints, FROM in all extremities.     SKIN: no suspicious lesions or rashes     NEURO: Normal strength and tone, sensory exam grossly normal, mentation intact and speech normal     PSYCH: mentation appears normal. and affect normal/bright     LYMPHATICS: No cervical adenopathy    Recent Labs   Lab Test 10/27/22  1031 05/16/22 2027   HGB 14.1 13.2    382    140   POTASSIUM 4.4 3.9   CR 0.63 0.65        Diagnostics:         Revised Cardiac Risk Index (RCRI):  The patient has the following serious cardiovascular risks for perioperative complications:   - No serious cardiac risks = 0 points     RCRI Interpretation: 0 points: Class I (very low risk - 0.4% complication rate)         Signed Electronically by: Yosef Mccabe MD  Copy of this evaluation report is provided to requesting physician.    Answers submitted by the patient for this visit:  Patient Health Questionnaire (Submitted on 10/4/2023)  If you checked off any problems, how difficult have these problems made it for you to do your work, take care of things at home, or get along with other people?: Somewhat difficult  PHQ9 TOTAL SCORE: 9

## 2023-10-18 ENCOUNTER — APPOINTMENT (OUTPATIENT)
Dept: URBAN - METROPOLITAN AREA CLINIC 240 | Age: 73
Setting detail: DERMATOLOGY
End: 2023-10-18

## 2023-10-18 DIAGNOSIS — B00.1 HERPESVIRAL VESICULAR DERMATITIS: ICD-10-CM

## 2023-10-18 PROCEDURE — OTHER PRESCRIPTION MEDICATION MANAGEMENT: OTHER

## 2023-10-18 PROCEDURE — 99213 OFFICE O/P EST LOW 20 MIN: CPT

## 2023-10-18 PROCEDURE — OTHER PRESCRIPTION: OTHER

## 2023-10-18 PROCEDURE — OTHER COUNSELING: OTHER

## 2023-10-18 RX ORDER — VALACYCLOVIR 1 G/1
TABLET, FILM COATED ORAL
Qty: 12 | Refills: 3 | Status: ERX | COMMUNITY
Start: 2023-10-18

## 2023-10-18 ASSESSMENT — LOCATION ZONE DERM: LOCATION ZONE: LIP

## 2023-10-18 ASSESSMENT — LOCATION DETAILED DESCRIPTION DERM: LOCATION DETAILED: LEFT INFERIOR VERMILION LIP

## 2023-10-18 ASSESSMENT — LOCATION SIMPLE DESCRIPTION DERM: LOCATION SIMPLE: LEFT LIP

## 2023-10-18 NOTE — HPI: INFECTION (HERPES SIMPLEX)
How Severe Is It?: mild
Is This A New Presentation, Or A Follow-Up?: Follow Up Herpes Simplex
Additional History: Valacyclovir 1 G, 2 tabs twice daily at first sign then 2 tabs 12 hours later. Pt needs rx.

## 2023-10-18 NOTE — PROCEDURE: PRESCRIPTION MEDICATION MANAGEMENT
Continue Regimen: Valacyclovir 1 Gram, 2 tablets at onset and 2 tabs 12 hours later.
Plan: [place]. Will refill for 1 year.
Detail Level: Zone
Render In Strict Bullet Format?: No

## 2023-10-29 DIAGNOSIS — E03.9 HYPOTHYROIDISM, UNSPECIFIED TYPE: ICD-10-CM

## 2023-10-30 RX ORDER — LEVOTHYROXINE SODIUM 100 UG/1
100 TABLET ORAL DAILY
Qty: 90 TABLET | Refills: 0 | Status: SHIPPED | OUTPATIENT
Start: 2023-10-30 | End: 2024-03-04

## 2023-11-13 ENCOUNTER — APPOINTMENT (OUTPATIENT)
Dept: URBAN - METROPOLITAN AREA CLINIC 240 | Age: 73
Setting detail: DERMATOLOGY
End: 2023-11-13

## 2023-11-13 DIAGNOSIS — B00.1 HERPESVIRAL VESICULAR DERMATITIS: ICD-10-CM

## 2023-11-13 PROCEDURE — OTHER ADDITIONAL NOTES: OTHER

## 2023-11-13 PROCEDURE — OTHER COUNSELING: OTHER

## 2023-11-13 PROCEDURE — OTHER PRESCRIPTION: OTHER

## 2023-11-13 PROCEDURE — 99213 OFFICE O/P EST LOW 20 MIN: CPT

## 2023-11-13 PROCEDURE — OTHER PRESCRIPTION MEDICATION MANAGEMENT: OTHER

## 2023-11-13 RX ORDER — HYDROCORTISONE 25 MG/G
OINTMENT TOPICAL
Qty: 453.6 | Refills: 0 | Status: ERX | COMMUNITY
Start: 2023-11-13

## 2023-11-13 ASSESSMENT — LOCATION DETAILED DESCRIPTION DERM: LOCATION DETAILED: RIGHT INFERIOR VERMILION LIP

## 2023-11-13 ASSESSMENT — LOCATION SIMPLE DESCRIPTION DERM: LOCATION SIMPLE: RIGHT LIP

## 2023-11-13 ASSESSMENT — LOCATION ZONE DERM: LOCATION ZONE: LIP

## 2023-11-13 NOTE — PROCEDURE: ADDITIONAL NOTES
Render Risk Assessment In Note?: no
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 the two sores she currently has are 2-3 weeks old and likely just erosions leftover perpetuated by dry cracked lips. will focus on treating the dry lips today and patient has valtrex on hand to treat any potential future cold sores. if more than 6 episodes per year, will initiate prophylactic treatment at valtrex 500mg PO daily. She shares that she has no renal/kidney disease. No issues with valtrex in past.
Detail Level: Simple

## 2023-11-13 NOTE — PROCEDURE: COUNSELING
Patient Specific Counseling (Will Not Stick From Patient To Patient): If the outbreaks happen more than the “usual” 6 times in a year, patient should consider using a prophylactic to lessen the chance of outbreaks, details and risks discussed during visit. Patient will try the new ointment and return if there is no improvement after 1 week.
Detail Level: Detailed

## 2023-11-13 NOTE — PROCEDURE: PRESCRIPTION MEDICATION MANAGEMENT
Initiate Treatment: Hydrocortisone 2.5% ointment
Plan: Continue taking Valacyclovir 1 gram- take 2 tablets at onset and then 2 tablets 12 hours later. \\nApply Vaseline to lips whenever the lips seem dry, try to keep moist at all times. \\nApply Hydrocortisone 2.5% ointment to lips twice daily for one week. RTC if there is no improvement or if condition gets worse.
Continue Regimen: Valacyclovir 1 gram tablet, Vaseline
Detail Level: Zone
Render In Strict Bullet Format?: No

## 2024-01-15 ENCOUNTER — OFFICE VISIT (OUTPATIENT)
Dept: FAMILY MEDICINE | Facility: CLINIC | Age: 74
End: 2024-01-15
Payer: COMMERCIAL

## 2024-01-15 VITALS
BODY MASS INDEX: 29.37 KG/M2 | TEMPERATURE: 98.4 F | HEIGHT: 58 IN | WEIGHT: 139.9 LBS | RESPIRATION RATE: 16 BRPM | OXYGEN SATURATION: 97 % | DIASTOLIC BLOOD PRESSURE: 85 MMHG | HEART RATE: 73 BPM | SYSTOLIC BLOOD PRESSURE: 139 MMHG

## 2024-01-15 DIAGNOSIS — E03.9 HYPOTHYROIDISM, UNSPECIFIED TYPE: ICD-10-CM

## 2024-01-15 DIAGNOSIS — K21.9 GASTROESOPHAGEAL REFLUX DISEASE, UNSPECIFIED WHETHER ESOPHAGITIS PRESENT: ICD-10-CM

## 2024-01-15 DIAGNOSIS — N95.2 ATROPHIC VAGINITIS: ICD-10-CM

## 2024-01-15 DIAGNOSIS — D75.839 THROMBOCYTOSIS: ICD-10-CM

## 2024-01-15 DIAGNOSIS — Z00.00 ENCOUNTER FOR MEDICARE ANNUAL WELLNESS EXAM: Primary | ICD-10-CM

## 2024-01-15 LAB
ALBUMIN SERPL BCG-MCNC: 4.5 G/DL (ref 3.5–5.2)
ALP SERPL-CCNC: 100 U/L (ref 40–150)
ALT SERPL W P-5'-P-CCNC: 12 U/L (ref 0–50)
ANION GAP SERPL CALCULATED.3IONS-SCNC: 12 MMOL/L (ref 7–15)
AST SERPL W P-5'-P-CCNC: 17 U/L (ref 0–45)
BILIRUB SERPL-MCNC: 0.3 MG/DL
BUN SERPL-MCNC: 5.4 MG/DL (ref 8–23)
CALCIUM SERPL-MCNC: 9.7 MG/DL (ref 8.8–10.2)
CHLORIDE SERPL-SCNC: 105 MMOL/L (ref 98–107)
CHOLEST SERPL-MCNC: 189 MG/DL
CREAT SERPL-MCNC: 0.63 MG/DL (ref 0.51–0.95)
DEPRECATED HCO3 PLAS-SCNC: 26 MMOL/L (ref 22–29)
EGFRCR SERPLBLD CKD-EPI 2021: >90 ML/MIN/1.73M2
ERYTHROCYTE [DISTWIDTH] IN BLOOD BY AUTOMATED COUNT: 13.3 % (ref 10–15)
FASTING STATUS PATIENT QL REPORTED: YES
GLUCOSE SERPL-MCNC: 95 MG/DL (ref 70–99)
HBA1C MFR BLD: 5.6 % (ref 0–5.6)
HCT VFR BLD AUTO: 44 % (ref 35–47)
HDLC SERPL-MCNC: 78 MG/DL
HGB BLD-MCNC: 13.8 G/DL (ref 11.7–15.7)
LDLC SERPL CALC-MCNC: 95 MG/DL
MCH RBC QN AUTO: 27.8 PG (ref 26.5–33)
MCHC RBC AUTO-ENTMCNC: 31.4 G/DL (ref 31.5–36.5)
MCV RBC AUTO: 89 FL (ref 78–100)
NONHDLC SERPL-MCNC: 111 MG/DL
PLATELET # BLD AUTO: 455 10E3/UL (ref 150–450)
POTASSIUM SERPL-SCNC: 4.3 MMOL/L (ref 3.4–5.3)
PROT SERPL-MCNC: 8.2 G/DL (ref 6.4–8.3)
RBC # BLD AUTO: 4.97 10E6/UL (ref 3.8–5.2)
SODIUM SERPL-SCNC: 143 MMOL/L (ref 135–145)
TRIGL SERPL-MCNC: 80 MG/DL
TSH SERPL DL<=0.005 MIU/L-ACNC: 0.36 UIU/ML (ref 0.3–4.2)
WBC # BLD AUTO: 9.4 10E3/UL (ref 4–11)

## 2024-01-15 PROCEDURE — 99397 PER PM REEVAL EST PAT 65+ YR: CPT | Performed by: INTERNAL MEDICINE

## 2024-01-15 PROCEDURE — 36415 COLL VENOUS BLD VENIPUNCTURE: CPT | Performed by: INTERNAL MEDICINE

## 2024-01-15 PROCEDURE — 85027 COMPLETE CBC AUTOMATED: CPT | Performed by: INTERNAL MEDICINE

## 2024-01-15 PROCEDURE — 83036 HEMOGLOBIN GLYCOSYLATED A1C: CPT | Performed by: INTERNAL MEDICINE

## 2024-01-15 PROCEDURE — 80061 LIPID PANEL: CPT | Performed by: INTERNAL MEDICINE

## 2024-01-15 PROCEDURE — 99213 OFFICE O/P EST LOW 20 MIN: CPT | Mod: 25 | Performed by: INTERNAL MEDICINE

## 2024-01-15 PROCEDURE — 84443 ASSAY THYROID STIM HORMONE: CPT | Performed by: INTERNAL MEDICINE

## 2024-01-15 PROCEDURE — 80053 COMPREHEN METABOLIC PANEL: CPT | Performed by: INTERNAL MEDICINE

## 2024-01-15 RX ORDER — ESTRADIOL 0.1 MG/G
2 CREAM VAGINAL
Qty: 42.5 G | Refills: 11 | Status: SHIPPED | OUTPATIENT
Start: 2024-01-15

## 2024-01-15 ASSESSMENT — ENCOUNTER SYMPTOMS
EYE PAIN: 0
SORE THROAT: 1
HEMATURIA: 0
HEMATOCHEZIA: 0
WEAKNESS: 0
NAUSEA: 0
HEADACHES: 1
PARESTHESIAS: 0
MYALGIAS: 0
DIARRHEA: 0
PALPITATIONS: 0
ABDOMINAL PAIN: 0
CHILLS: 0
NERVOUS/ANXIOUS: 1
SHORTNESS OF BREATH: 0
FEVER: 0
COUGH: 0
BREAST MASS: 0
DIZZINESS: 0
DYSURIA: 0
CONSTIPATION: 0
FREQUENCY: 1
JOINT SWELLING: 0

## 2024-01-15 ASSESSMENT — PAIN SCALES - GENERAL: PAINLEVEL: NO PAIN (0)

## 2024-01-15 ASSESSMENT — ACTIVITIES OF DAILY LIVING (ADL): CURRENT_FUNCTION: NO ASSISTANCE NEEDED

## 2024-01-15 NOTE — LETTER
January 16, 2024      Keyonna De La Garza  [address] [facility] [address]        Dear ,    We are writing to inform you of your test results.    -Liver and gallbladder tests are normal for you. (ALT,AST, Alk phos, bilirubin), kidney function is normal for you (Creatinine, GFR), Sodium is normal, Potassium is normal for you, Calcium is normal for you, Glucose (blood sugar) is normal for you.      -TSH (thyroid stimulating hormone) level is normal which indicates normal circulating thyroid hormone levels.      -Your cholesterol panel looks healthy.     -Your complete blood counts including your hemoglobin returned normal for you with the exception of a mild elevation in the blood platelets.       -Your hemoglobin A1c test which averages your blood sugars over the last 3 months returned at 5.6 which is normal.     Bottom line:  The labs look stable and at goal for you with the exception of the very mild platelet elevation.  I recommend a recheck for the platelets in a well-hydrated (non-fasting) state.   You can schedule a lab appointment for that purpose.         Follow up:  Lab appointment as soon as convenient to recheck the blood platelets.     Resulted Orders   TSH WITH FREE T4 REFLEX   Result Value Ref Range    TSH 0.36 0.30 - 4.20 uIU/mL   Lipid panel reflex to direct LDL Fasting   Result Value Ref Range    Cholesterol 189 <200 mg/dL    Triglycerides 80 <150 mg/dL    Direct Measure HDL 78 >=50 mg/dL    LDL Cholesterol Calculated 95 <=100 mg/dL    Non HDL Cholesterol 111 <130 mg/dL    Patient Fasting > 8hrs? Yes     Narrative    Cholesterol  Desirable:  <200 mg/dL    Triglycerides  Normal:  Less than 150 mg/dL  Borderline High:  150-199 mg/dL  High:  200-499 mg/dL  Very High:  Greater than or equal to 500 mg/dL    Direct Measure HDL  Female:  Greater than or equal to 50 mg/dL   Male:  Greater than or equal to 40 mg/dL    LDL Cholesterol  Desirable:  <100mg/dL  Above Desirable:  100-129  mg/dL   Borderline High:  130-159 mg/dL   High:  160-189 mg/dL   Very High:  >= 190 mg/dL    Non HDL Cholesterol  Desirable:  130 mg/dL  Above Desirable:  130-159 mg/dL  Borderline High:  160-189 mg/dL  High:  190-219 mg/dL  Very High:  Greater than or equal to 220 mg/dL   Comprehensive metabolic panel   Result Value Ref Range    Sodium 143 135 - 145 mmol/L      Comment:      Reference intervals for this test were updated on 09/26/2023 to more accurately reflect our healthy population. There may be differences in the flagging of prior results with similar values performed with this method. Interpretation of those prior results can be made in the context of the updated reference intervals.     Potassium 4.3 3.4 - 5.3 mmol/L    Carbon Dioxide (CO2) 26 22 - 29 mmol/L    Anion Gap 12 7 - 15 mmol/L    Urea Nitrogen 5.4 (L) 8.0 - 23.0 mg/dL    Creatinine 0.63 0.51 - 0.95 mg/dL    GFR Estimate >90 >60 mL/min/1.73m2    Calcium 9.7 8.8 - 10.2 mg/dL    Chloride 105 98 - 107 mmol/L    Glucose 95 70 - 99 mg/dL    Alkaline Phosphatase 100 40 - 150 U/L      Comment:      Reference intervals for this test were updated on 11/14/2023 to more accurately reflect our healthy population. There may be differences in the flagging of prior results with similar values performed with this method. Interpretation of those prior results can be made in the context of the updated reference intervals.    AST 17 0 - 45 U/L      Comment:      Reference intervals for this test were updated on 6/12/2023 to more accurately reflect our healthy population. There may be differences in the flagging of prior results with similar values performed with this method. Interpretation of those prior results can be made in the context of the updated reference intervals.    ALT 12 0 - 50 U/L      Comment:      Reference intervals for this test were updated on 6/12/2023 to more accurately reflect our healthy population. There may be differences in the flagging of  prior results with similar values performed with this method. Interpretation of those prior results can be made in the context of the updated reference intervals.      Protein Total 8.2 6.4 - 8.3 g/dL    Albumin 4.5 3.5 - 5.2 g/dL    Bilirubin Total 0.3 <=1.2 mg/dL   CBC with platelets   Result Value Ref Range    WBC Count 9.4 4.0 - 11.0 10e3/uL    RBC Count 4.97 3.80 - 5.20 10e6/uL    Hemoglobin 13.8 11.7 - 15.7 g/dL    Hematocrit 44.0 35.0 - 47.0 %    MCV 89 78 - 100 fL    MCH 27.8 26.5 - 33.0 pg    MCHC 31.4 (L) 31.5 - 36.5 g/dL    RDW 13.3 10.0 - 15.0 %    Platelet Count 455 (H) 150 - 450 10e3/uL   HEMOGLOBIN A1C   Result Value Ref Range    Hemoglobin A1C 5.6 0.0 - 5.6 %      Comment:      Normal <5.7%   Prediabetes 5.7-6.4%    Diabetes 6.5% or higher     Note: Adopted from ADA consensus guidelines.       If you have any questions or concerns, please call the clinic at the number listed above.       Sincerely,      Yosef Mccabe MD

## 2024-01-15 NOTE — PATIENT INSTRUCTIONS
"       Patient Education   Personalized Prevention Plan  You are due for the preventive services outlined below.  Your care team is available to assist you in scheduling these services.  If you have already completed any of these items, please share that information with your care team to update in your medical record.  Health Maintenance Due   Topic Date Due    RSV VACCINE (Pregnancy & 60+) (1 - 1-dose 60+ series) Never done    Annual Wellness Visit  10/27/2023    Thyroid Function Lab  10/27/2023     Learning About Being Physically Active  What is physical activity?     Being physically active means doing any kind of activity that gets your body moving.  The types of physical activity that can help you get fit and stay healthy include:  Aerobic or \"cardio\" activities. These make your heart beat faster and make you breathe harder, such as brisk walking, riding a bike, or running. They strengthen your heart and lungs and build up your endurance.  Strength training activities. These make your muscles work against, or \"resist,\" something. Examples include lifting weights or doing push-ups. These activities help tone and strengthen your muscles and bones.  Stretches. These let you move your joints and muscles through their full range of motion. Stretching helps you be more flexible.  Reaching a balance between these three types of physical activity is important because each one contributes to your overall fitness.  What are the benefits of being active?  Being active is one of the best things you can do for your health. It helps you to:  Feel stronger and have more energy to do all the things you like to do.  Focus better at school or work.  Feel, think, and sleep better.  Reach and stay at a healthy weight.  Lose fat and build lean muscle.  Lower your risk for serious health problems, including diabetes, heart attack, high blood pressure, and some cancers.  Keep your heart, lungs, bones, muscles, and joints strong and " "healthy.  How can you make being active part of your life?  Start slowly. Make it your long-term goal to get at least 30 minutes of exercise on most days of the week. Walking is a good choice. You also may want to do other activities, such as running, swimming, cycling, or playing tennis or team sports.  Pick activities that you like--ones that make your heart beat faster, your muscles stronger, and your muscles and joints more flexible. If you find more than one thing you like doing, do them all. You don't have to do the same thing every day.  Get your heart pumping every day. Any activity that makes your heart beat faster and keeps it at that rate for a while counts.  Here are some great ways to get your heart beating faster:  Go for a brisk walk, run, or hike.  Go for a swim or bike ride.  Take an online exercise class or dance.  Play a game of touch football, basketball, or soccer.  Play tennis, pickleball, or racquetball.  Climb stairs.  Even some household chores can be aerobic. Just do them at a faster pace. Raking or mowing the lawn, sweeping the garage, and vacuuming and cleaning your home all can help get your heart rate up.  Strengthen your muscles during the week. You don't have to lift heavy weights or grow big, bulky muscles to get stronger. Doing a few simple activities that make your muscles work against, or \"resist,\" something can help you get stronger. Aim for at least twice a week.  For example, you can:  Do push-ups or sit-ups, which use your own body weight as resistance.  Lift weights or dumbbells or use stretch bands at home or in a gym or community center.  Stretch your muscles often. Stretching will help you as you become more active. It can help you stay flexible and loosen tight muscles. It can also help improve your balance and posture and can be a great way to relax.  Be sure to stretch the muscles you'll be using when you work out. It's best to warm your muscles slightly before you " "stretch them. Walk or do some other light aerobic activity for a few minutes. Then start stretching.  When you stretch your muscles:  Do it slowly. Stretching is not about going fast or making sudden movements.  Don't push or bounce during a stretch.  Hold each stretch for at least 15 to 30 seconds, if you can. You should feel a stretch in the muscle, but not pain.  Breathe out as you do the stretch. Then breathe in as you hold the stretch. Don't hold your breath.  If you're worried about how more activity might affect your health, have a checkup before you start. Follow any special advice your doctor gives you for getting a smart start.  Where can you learn more?  Go to https://www.[employer].net/patiented  Enter W332 in the search box to learn more about \"Learning About Being Physically Active.\"  Current as of: June 6, 2023               Content Version: 13.8    5728-6329 [employer].   Care instructions adapted under license by your healthcare professional. If you have questions about a medical condition or this instruction, always ask your healthcare professional. [employer] disclaims any warranty or liability for your use of this information.      Hearing Loss: Care Instructions  Overview     Hearing loss is a sudden or slow decrease in how well you hear. It can range from slight to profound. Permanent hearing loss can occur with aging. It also can happen when you are exposed long-term to loud noise. Examples include listening to loud music, riding motorcycles, or being around other loud machines.  Hearing loss can affect your work and home life. It can make you feel lonely or depressed. You may feel that you have lost your independence. But hearing aids and other devices can help you hear better and feel connected to others.  Follow-up care is a key part of your treatment and safety. Be sure to make and go to all appointments, and call your doctor if you are having problems. It's " also a good idea to know your test results and keep a list of the medicines you take.  How can you care for yourself at home?  Avoid loud noises whenever possible. This helps keep your hearing from getting worse.  Always wear hearing protection around loud noises.  Wear a hearing aid as directed.  A professional can help you pick a hearing aid that will work best for you.  You can also get hearing aids over the counter for mild to moderate hearing loss.  Have hearing tests as your doctor suggests. They can show whether your hearing has changed. Your hearing aid may need to be adjusted.  Use other devices as needed. These may include:  Telephone amplifiers and hearing aids that can connect to a television, stereo, radio, or microphone.  Devices that use lights or vibrations. These alert you to the doorbell, a ringing telephone, or a baby monitor.  Television closed-captioning. This shows the words at the bottom of the screen. Most new TVs can do this.  TTY (text telephone). This lets you type messages back and forth on the telephone instead of talking or listening. These devices are also called TDD. When messages are typed on the keyboard, they are sent over the phone line to a receiving TTY. The message is shown on a monitor.  Use text messaging, social media, and email if it is hard for you to communicate by telephone.  Try to learn a listening technique called speechreading. It is not lipreading. You pay attention to people's gestures, expressions, posture, and tone of voice. These clues can help you understand what a person is saying. Face the person you are talking to, and have them face you. Make sure the lighting is good. You need to see the other person's face clearly.  Think about counseling if you need help to adjust to your hearing loss.  When should you call for help?  Watch closely for changes in your health, and be sure to contact your doctor if:    You think your hearing is getting worse.     You have  "new symptoms, such as dizziness or nausea.   Where can you learn more?  Go to https://www.[employer].net/patiented  Enter R798 in the search box to learn more about \"Hearing Loss: Care Instructions.\"  Current as of: February 28, 2023               Content Version: 13.8    5596-2450 [employer].   Care instructions adapted under license by your healthcare professional. If you have questions about a medical condition or this instruction, always ask your healthcare professional. [employer] disclaims any warranty or liability for your use of this information.      Bladder Training: Care Instructions  Your Care Instructions     Bladder training is used to treat urge incontinence and stress incontinence. Urge incontinence means that the need to urinate comes on so fast that you can't get to a toilet in time. Stress incontinence means that you leak urine because of pressure on your bladder. For example, it may happen when you laugh, cough, or lift something heavy.  Bladder training can increase how long you can wait before you have to urinate. It can also help your bladder hold more urine. And it can give you better control over the urge to urinate.  It is important to remember that bladder training takes a few weeks to a few months to make a difference. You may not see results right away, but don't give up.  Follow-up care is a key part of your treatment and safety. Be sure to make and go to all appointments, and call your doctor if you are having problems. It's also a good idea to know your test results and keep a list of the medicines you take.  How can you care for yourself at home?  Work with your doctor to come up with a bladder training program that is right for you. You may use one or more of the following methods.  Delayed urination  In the beginning, try to keep from urinating for 5 minutes after you first feel the need to go.  While you wait, take deep, slow breaths to relax. Jamila " "exercises can also help you delay the need to go to the bathroom.  After some practice, when you can easily wait 5 minutes to urinate, try to wait 10 minutes before you urinate.  Slowly increase the waiting period until you are able to control when you have to urinate.  Scheduled urination  Empty your bladder when you first wake up in the morning.  Schedule times throughout the day when you will urinate.  Start by going to the bathroom every hour, even if you don't need to go.  Slowly increase the time between trips to the bathroom.  When you have found a schedule that works well for you, keep doing it.  If you wake up during the night and have to urinate, do it. Apply your schedule to waking hours only.  Kegel exercises  These tighten and strengthen pelvic muscles, which can help you control the flow of urine. (If doing these exercises causes pain, stop doing them and talk with your doctor.) To do Kegel exercises:  Squeeze your muscles as if you were trying not to pass gas. Or squeeze your muscles as if you were stopping the flow of urine. Your belly, legs, and buttocks shouldn't move.  Hold the squeeze for 3 seconds, then relax for 5 to 10 seconds.  Start with 3 seconds, then add 1 second each week until you are able to squeeze for 10 seconds.  Repeat the exercise 10 times a session. Do 3 to 8 sessions a day.  When should you call for help?  Watch closely for changes in your health, and be sure to contact your doctor if:    Your incontinence is getting worse.     You do not get better as expected.   Where can you learn more?  Go to https://www.[employer]com.net/patiented  Enter V684 in the search box to learn more about \"Bladder Training: Care Instructions.\"  Current as of: February 28, 2023               Content Version: 13.8    0051-9189 [employer].   Care instructions adapted under license by your healthcare professional. If you have questions about a medical condition or this instruction, always ask " your healthcare professional. [employer] disclaims any warranty or liability for your use of this information.

## 2024-01-15 NOTE — COMMUNITY RESOURCES LIST (ENGLISH)
01/15/2024   [facility] FONU2  N/A  For questions about this resource list or additional care needs, please contact your primary care clinic or care manager.  Phone: [phone]   Email: N/A   Address: [address] [place] [id number]   Hours: N/A        Financial Stability       Utility payment assistance  1  [facility] Distance: 4.79 miles      In-Person, Phone/Virtual   [address] [place] [address]  Language: English, Singaporean  Hours: Mon - Fri 9:00 AM - 4:30 PM  Fees: Free   Phone: (650) 789-4683 Ext.113 Email: [email or web address] Website: [email or web address]     2  [facility] (ICA) - [employer] - Homelessness Prevention - Utility payment assistance Distance: 7.81 miles      In-Person, Phone/Virtual   [address] [place] [address]  Language: English, East Timorese, Singaporean  Hours: Mon 12:00 AM - 7:00 PM , Tue 10:00 AM - 3:00 PM , Wed - Thu 10:00 AM - 2:30 PM  Fees: Free   Phone: (108) 826-2089 Email: ica@[employer].org Website: http://www.[employer].org          Important Numbers & Websites       Emergency Services   911  City Services   311  Poison Control   (244) 957-4008  Suicide Prevention Lifeline   (225) 909-8760 (TALK)  Child Abuse Hotline   (444) 703-8479 (4-A-Child)  Sexual Assault Hotline   (710) 292-8928 (HOPE)  National Runaway Safeline   (885) 209-6335 (RUNAWAY)  All-Options Talkline   (295) 713-7282  Substance Abuse Referral   (678) 619-8026 (HELP)

## 2024-01-15 NOTE — PROGRESS NOTES
"SUBJECTIVE:   Keyonna Yoder is a 73 year old, presenting for the following:  Physical        Are you in the first 12 months of your Medicare coverage?  No    Healthy Habits:     In general, how would you rate your overall health?  Good    Frequency of exercise:  None    Do you usually eat at least 4 servings of fruit and vegetables a day, include whole grains    & fiber and avoid regularly eating high fat or \"junk\" foods?  Yes    Taking medications regularly:  Yes    Medication side effects:  None    Ability to successfully perform activities of daily living:  No assistance needed    Home Safety:  No safety concerns identified    Hearing Impairment:  Need to ask people to speak up or repeat themselves and difficulty understanding soft or whispered speech    In the past 6 months, have you been bothered by leaking of urine? Yes    In general, how would you rate your overall mental or emotional health?  Good    Additional concerns today:  No          Have you ever done Advance Care Planning? (For example, a Health Directive, POLST, or a discussion with a medical provider or your loved ones about your wishes): Yes, advance care planning is on file.       Fall risk  Fallen 2 or more times in the past year?: No  Any fall with injury in the past year?: No    Cognitive Screening   1) Repeat 3 items (Leader, Season, Table)    2) Clock draw: NORMAL  3) 3 item recall: Recalls 3 objects  Results: 3 items recalled: COGNITIVE IMPAIRMENT LESS LIKELY    Mini-CogTM Copyright GUIDO Ozuna. Licensed by the author for use in [facility]; reprinted with permission (micki@.[facility]). All rights reserved.      Do you have sleep apnea, excessive snoring or daytime drowsiness? : no    Reviewed and updated as needed this visit by clinical staff   Tobacco  Allergies  Meds              Reviewed and updated as needed this visit by Provider                 Social History     Tobacco Use    Smoking status: Never    Smokeless tobacco: Never "   Substance Use Topics    Alcohol use: Yes     Alcohol/week: 0.0 standard drinks of alcohol     Comment: 2 drinks per week             1/15/2024    10:42 AM   Alcohol Use   Prescreen: >3 drinks/day or >7 drinks/week? No     Do you have a current opioid prescription? No  Do you use any other controlled substances or medications that are not prescribed by a provider? None              Current providers sharing in care for this patient include:   Patient Care Team:  Yosef Mccabe MD as PCP - General (Internal Medicine)  Yosef Mccabe MD as Assigned PCP    The following health maintenance items are reviewed in Epic and correct as of today:  Health Maintenance   Topic Date Due    RSV VACCINE (Pregnancy & 60+) (1 - 1-dose 60+ series) Never done    MEDICARE ANNUAL WELLNESS VISIT  10/27/2023    TSH W/FREE T4 REFLEX  10/27/2023    ANNUAL REVIEW OF HM ORDERS  10/04/2024    FALL RISK ASSESSMENT  01/15/2025    MAMMO SCREENING  09/06/2025    LIPID  10/27/2027    ADVANCE CARE PLANNING  01/15/2029    COLORECTAL CANCER SCREENING  06/24/2029    DEXA  08/03/2030    DTAP/TDAP/TD IMMUNIZATION (4 - Td or Tdap) 10/27/2032    HEPATITIS C SCREENING  Completed    PHQ-2 (once per calendar year)  Completed    INFLUENZA VACCINE  Completed    Pneumococcal Vaccine: 65+ Years  Completed    ZOSTER IMMUNIZATION  Completed    COVID-19 Vaccine  Completed    IPV IMMUNIZATION  Aged Out    HPV IMMUNIZATION  Aged Out    MENINGITIS IMMUNIZATION  Aged Out    RSV MONOCLONAL ANTIBODY  Aged Out     BP Readings from Last 3 Encounters:   01/15/24 139/85   10/04/23 133/77   10/27/22 138/76    Wt Readings from Last 3 Encounters:   01/15/24 63.5 kg (139 lb 14.4 oz)   10/04/23 64.4 kg (142 lb)   10/27/22 64.2 kg (141 lb 8 oz)                  Patient Active Problem List   Diagnosis    Advanced directives, counseling/discussion    Hypothyroidism    CARDIOVASCULAR SCREENING; LDL GOAL LESS THAN 130    GERD (gastroesophageal reflux disease)    Osteopenia     Cholecystitis    Lumbago    Chronic left shoulder pain     Past Surgical History:   Procedure Laterality Date    COLONOSCOPY      LAPAROSCOPIC CHOLECYSTECTOMY  4/24/2013    Procedure: LAPAROSCOPIC CHOLECYSTECTOMY;  Laparoscopic cholecystectomy.;  Surgeon: Bong Pyle MD;  Location:  OR       Social History     Tobacco Use    Smoking status: Never    Smokeless tobacco: Never   Substance Use Topics    Alcohol use: Yes     Alcohol/week: 0.0 standard drinks of alcohol     Comment: 2 drinks per week     Family History   Problem Relation Age of Onset    C.A.D. Mother     Cerebrovascular Disease Father          Current Outpatient Medications   Medication Sig Dispense Refill    acetaminophen (TYLENOL) 500 MG tablet Take 1,000 mg by mouth every 8 hours as needed for mild pain      Famotidine (PEPCID PO) Every morning      levothyroxine (SYNTHROID/LEVOTHROID) 100 MCG tablet Take 1 tablet (100 mcg) by mouth daily 90 tablet 0             Review of Systems   Constitutional:  Negative for chills and fever.   HENT:  Positive for congestion, hearing loss and sore throat. Negative for ear pain.    Eyes:  Positive for visual disturbance. Negative for pain.   Respiratory:  Negative for cough and shortness of breath.    Cardiovascular:  Negative for chest pain, palpitations and peripheral edema.   Gastrointestinal:  Negative for abdominal pain, constipation, diarrhea, hematochezia and nausea.   Breasts:  Negative for tenderness, breast mass and discharge.   Genitourinary:  Positive for frequency, urgency and vaginal discharge. Negative for dysuria, genital sores, hematuria, pelvic pain and vaginal bleeding.   Musculoskeletal:  Negative for joint swelling and myalgias.   Skin:  Negative for rash.   Neurological:  Positive for headaches. Negative for dizziness, weakness and paresthesias.   Psychiatric/Behavioral:  Negative for mood changes. The patient is nervous/anxious.      Above symptoms are chronic and unchanged, she  "did hope to discuss persistent vaginal discharge and frequency and urgency present for over one year    OBJECTIVE:   /85 (BP Location: Right arm, Patient Position: Sitting, Cuff Size: Adult Regular)   Pulse 73   Temp 98.4  F (36.9  C) (Tympanic)   Resp 16   Ht 1.473 m (4' 10\")   Wt 63.5 kg (139 lb 14.4 oz)   SpO2 97%   BMI 29.24 kg/m   Estimated body mass index is 29.24 kg/m  as calculated from the following:    Height as of this encounter: 1.473 m (4' 10\").    Weight as of this encounter: 63.5 kg (139 lb 14.4 oz).  Physical Exam  GENERAL APPEARANCE: healthy, alert and no distress  EYES: Eyes grossly normal to inspection, PERRL and conjunctivae and sclerae normal  HENT: ear canals and TM's normal, nose and mouth without ulcers or lesions, oropharynx clear and oral mucous membranes moist  NECK: no adenopathy, no asymmetry, masses, or scars and thyroid normal to palpation  RESP: lungs clear to auscultation - no rales, rhonchi or wheezes  CV: regular rate and rhythm, normal S1 S2, no S3 or S4, no murmur, click or rub, no peripheral edema and peripheral pulses strong  ABDOMEN: soft, nontender, no hepatosplenomegaly, no masses and bowel sounds normal  MS: no musculoskeletal defects are noted and gait is age appropriate without ataxia  SKIN: no suspicious lesions or rashes  NEURO: Normal strength and tone, sensory exam grossly normal, mentation intact and speech normal  PSYCH: mentation appears normal and affect normal/bright        ASSESSMENT / PLAN:       ICD-10-CM    1. Encounter for Medicare annual wellness exam  Z00.00 Lipid panel reflex to direct LDL Fasting     Comprehensive metabolic panel     CBC with platelets     HEMOGLOBIN A1C      2. Hypothyroidism, unspecified type  E03.9 TSH WITH FREE T4 REFLEX      3. Gastroesophageal reflux disease, unspecified whether esophagitis present  K21.9       4. Atrophic vaginitis  N95.2 estradiol (ESTRACE) 0.1 MG/GM vaginal cream        -recheck TFTs  -trial of " vaginal estrogen to see if it helps vaginal and urinary symptoms; if it does not help enough, recommended GYN visit;         COUNSELING:  Reviewed preventive health counseling, as reflected in patient instructions  Special attention given to:       Regular exercise       Healthy diet/nutrition       Immunizations  Discussed RSV shot at pharmacy            Consider lung cancer screening for ages 55-80 years (77 for Medicare) and 20 pack-year smoking history  ; never smoker        Colon cancer screening:  repeat 2029       Mammogram:  Had a mammogram in September         She reports that she has never smoked. She has never used smokeless tobacco.      Appropriate preventive services were discussed with this patient, including applicable screening as appropriate for fall prevention, nutrition, physical activity, Tobacco-use cessation, weight loss and cognition.  Checklist reviewing preventive services available has been given to the patient.    Reviewed patients plan of care and provided an AVS. The Basic Care Plan (routine screening as documented in Health Maintenance) for Keyonna meets the Care Plan requirement. This Care Plan has been established and reviewed with the Patient.        Yosef Mccabe MD  [facility]    Identified Health Risks:

## 2024-01-15 NOTE — PROGRESS NOTES
She is at risk for lack of exercise and has been provided with information to increase physical activity for the benefit of her well-being.  The patient was provided with written information regarding signs of hearing loss.  Information on urinary incontinence and treatment options given to patient.

## 2024-01-16 NOTE — RESULT ENCOUNTER NOTE
The following letter pertains to your most recent diagnostic tests:    -Liver and gallbladder tests are normal for you. (ALT,AST, Alk phos, bilirubin), kidney function is normal for you (Creatinine, GFR), Sodium is normal, Potassium is normal for you, Calcium is normal for you, Glucose (blood sugar) is normal for you.      -TSH (thyroid stimulating hormone) level is normal which indicates normal circulating thyroid hormone levels.      -Your cholesterol panel looks healthy.     -Your complete blood counts including your hemoglobin returned normal for you with the exception of a mild elevation in the blood platelets.      -Your hemoglobin A1c test which averages your blood sugars over the last 3 months returned at 5.6 which is normal.    Bottom line:  The labs look stable and at goal for you with the exception of the very mild platelet elevation.  I recommend a recheck for the platelets in a well-hydrated (non-fasting) state.   You can schedule a lab appointment for that purpose.        Follow up:  Lab appointment as soon as convenient to recheck the blood platelets.       Sincerely,    Dr. Mccabe

## 2024-01-31 ENCOUNTER — LAB (OUTPATIENT)
Dept: LAB | Facility: CLINIC | Age: 74
End: 2024-01-31
Payer: COMMERCIAL

## 2024-01-31 DIAGNOSIS — D75.839 THROMBOCYTOSIS: ICD-10-CM

## 2024-01-31 LAB
BASOPHILS # BLD AUTO: 0 10E3/UL (ref 0–0.2)
BASOPHILS NFR BLD AUTO: 0 %
EOSINOPHIL # BLD AUTO: 0.2 10E3/UL (ref 0–0.7)
EOSINOPHIL NFR BLD AUTO: 2 %
ERYTHROCYTE [DISTWIDTH] IN BLOOD BY AUTOMATED COUNT: 13.1 % (ref 10–15)
HCT VFR BLD AUTO: 42 % (ref 35–47)
HGB BLD-MCNC: 13.1 G/DL (ref 11.7–15.7)
IMM GRANULOCYTES # BLD: 0 10E3/UL
IMM GRANULOCYTES NFR BLD: 0 %
LYMPHOCYTES # BLD AUTO: 3.2 10E3/UL (ref 0.8–5.3)
LYMPHOCYTES NFR BLD AUTO: 29 %
MCH RBC QN AUTO: 27.9 PG (ref 26.5–33)
MCHC RBC AUTO-ENTMCNC: 31.2 G/DL (ref 31.5–36.5)
MCV RBC AUTO: 89 FL (ref 78–100)
MONOCYTES # BLD AUTO: 0.8 10E3/UL (ref 0–1.3)
MONOCYTES NFR BLD AUTO: 8 %
NEUTROPHILS # BLD AUTO: 6.7 10E3/UL (ref 1.6–8.3)
NEUTROPHILS NFR BLD AUTO: 61 %
PLATELET # BLD AUTO: 456 10E3/UL (ref 150–450)
RBC # BLD AUTO: 4.7 10E6/UL (ref 3.8–5.2)
RETICS # AUTO: 0.07 10E6/UL (ref 0.03–0.1)
RETICS/RBC NFR AUTO: 1.5 % (ref 0.5–2)
WBC # BLD AUTO: 11 10E3/UL (ref 4–11)

## 2024-01-31 PROCEDURE — 36415 COLL VENOUS BLD VENIPUNCTURE: CPT

## 2024-01-31 PROCEDURE — 99207 BLOOD MORPHOLOGY PATHOLOGIST REVIEW: CPT | Performed by: PATHOLOGY

## 2024-01-31 PROCEDURE — 85045 AUTOMATED RETICULOCYTE COUNT: CPT

## 2024-01-31 PROCEDURE — 85025 COMPLETE CBC W/AUTO DIFF WBC: CPT

## 2024-01-31 NOTE — LETTER
February 1, 2024      Keyonna De La Garza  [address] [facility] [address]        Dear ,    We are writing to inform you of your test results.    The following letter pertains to your most recent diagnostic tests:     The platelets remain very mildly elevated upon second check.  The remainder of the blood counts and blood cells appear normal.  I recommend another check of the platelet count in 2-3 months to establish a trend.  You can schedule a lab appointment for that purpose.         Sincerely,     Dr. Mccabe     Resulted Orders   d morphology pathology review   Result Value Ref Range    Final Diagnosis       Peripheral blood:  -- Slight thrombocytosis.      Comment       Thrombocytosis is present. Some possible etiologies include inflammation, infections, post splenectomy states, stress, hemolysis, iron deficiency, acute blood loss, and myeloproliferative disorders. If clinically indicated, JAK2/MPL/CALR mutation analysis (performed on peripheral blood) could be considered.         Clinical Information       Thrombocytosis      Peripheral Smear       The red blood cells appear hypochromic.  Rouleaux formation does not appear increased.  Polychromasia does not appear increased.  Poikilocytosis appears mild and nonspecific.  Platelets  are well granulated.  Lymphocytes are predominantly small with cytologically mature chromatin and appear overall polymorphous.  Neutrophils contain normal cytoplasmic granulation and unremarkable nuclear morphology.  There is no dysplasia and no circulating blasts are seen.        Peripheral Hematologic Data        Latest Reference Range & Units 01/31/24 15:21   WBC 4.0 - 11.0 10e3/uL 11.0   Hemoglobin 11.7 - 15.7 g/dL 13.1   Hematocrit 35.0 - 47.0 % 42.0   Platelet Count 150 - 450 10e3/uL 456 (H)   RBC Count 3.80 - 5.20 10e6/uL 4.70   MCV 78 - 100 fL 89   MCH 26.5 - 33.0 pg 27.9   MCHC 31.5 - 36.5 g/dL 31.2 (L)   RDW 10.0 - 15.0 % 13.1   % Neutrophils % 61    % Lymphocytes % 29   % Monocytes % 8   % Eosinophils % 2   % Basophils % 0   Absolute Basophils 0.0 - 0.2 10e3/uL 0.0   Absolute Eosinophils 0.0 - 0.7 10e3/uL 0.2   Absolute Immature Granulocytes <=0.4 10e3/uL 0.0   Absolute Lymphocytes 0.8 - 5.3 10e3/uL 3.2   Absolute Monocytes 0.0 - 1.3 10e3/uL 0.8   % Immature Granulocytes % 0   Absolute Neutrophils 1.6 - 8.3 10e3/uL 6.7   % Retic 0.5 - 2.0 % 1.5   Absolute Retic 0.025 - 0.095 10e6/uL 0.068   (H): Data is abnormally high  (L): Data is abnormally low      Performing Labs       The technical component of this testing was completed at [facility], [facility] and [facility]     CBC with platelets and differential   Result Value Ref Range    WBC Count 11.0 4.0 - 11.0 10e3/uL    RBC Count 4.70 3.80 - 5.20 10e6/uL    Hemoglobin 13.1 11.7 - 15.7 g/dL    Hematocrit 42.0 35.0 - 47.0 %    MCV 89 78 - 100 fL    MCH 27.9 26.5 - 33.0 pg    MCHC 31.2 (L) 31.5 - 36.5 g/dL    RDW 13.1 10.0 - 15.0 %    Platelet Count 456 (H) 150 - 450 10e3/uL    % Neutrophils 61 %    % Lymphocytes 29 %    % Monocytes 8 %    % Eosinophils 2 %    % Basophils 0 %    % Immature Granulocytes 0 %    Absolute Neutrophils 6.7 1.6 - 8.3 10e3/uL    Absolute Lymphocytes 3.2 0.8 - 5.3 10e3/uL    Absolute Monocytes 0.8 0.0 - 1.3 10e3/uL    Absolute Eosinophils 0.2 0.0 - 0.7 10e3/uL    Absolute Basophils 0.0 0.0 - 0.2 10e3/uL    Absolute Immature Granulocytes 0.0 <=0.4 10e3/uL   Reticulocyte count   Result Value Ref Range    % Reticulocyte 1.5 0.5 - 2.0 %    Absolute Reticulocyte 0.068 0.025 - 0.095 10e6/uL       If you have any questions or concerns, please call the clinic at the number listed above.       Sincerely,      Yosef Mccabe MD

## 2024-02-01 DIAGNOSIS — D75.839 THROMBOCYTOSIS: Primary | ICD-10-CM

## 2024-02-01 LAB
PATH REPORT.COMMENTS IMP SPEC: NORMAL
PATH REPORT.COMMENTS IMP SPEC: NORMAL
PATH REPORT.FINAL DX SPEC: NORMAL
PATH REPORT.MICROSCOPIC SPEC OTHER STN: NORMAL
PATH REPORT.MICROSCOPIC SPEC OTHER STN: NORMAL
PATH REPORT.RELEVANT HX SPEC: NORMAL

## 2024-02-01 NOTE — RESULT ENCOUNTER NOTE
The following letter pertains to your most recent diagnostic tests:    The platelets remain very mildly elevated upon second check.  The remainder of the blood counts and blood cells appear normal.  I recommend another check of the platelet count in 2-3 months to establish a trend.  You can schedule a lab appointment for that purpose.        Sincerely,    Dr. Mccabe

## 2024-03-02 DIAGNOSIS — E03.9 HYPOTHYROIDISM, UNSPECIFIED TYPE: ICD-10-CM

## 2024-03-04 RX ORDER — LEVOTHYROXINE SODIUM 100 UG/1
100 TABLET ORAL DAILY
Qty: 90 TABLET | Refills: 2 | Status: SHIPPED | OUTPATIENT
Start: 2024-03-04

## 2024-04-02 ENCOUNTER — LAB (OUTPATIENT)
Dept: LAB | Facility: CLINIC | Age: 74
End: 2024-04-02
Payer: COMMERCIAL

## 2024-04-02 DIAGNOSIS — D75.839 THROMBOCYTOSIS: ICD-10-CM

## 2024-04-02 LAB
BASOPHILS # BLD AUTO: 0 10E3/UL (ref 0–0.2)
BASOPHILS NFR BLD AUTO: 0 %
EOSINOPHIL # BLD AUTO: 0.3 10E3/UL (ref 0–0.7)
EOSINOPHIL NFR BLD AUTO: 4 %
ERYTHROCYTE [DISTWIDTH] IN BLOOD BY AUTOMATED COUNT: 12.6 % (ref 10–15)
FERRITIN SERPL-MCNC: 68 NG/ML (ref 11–328)
HCT VFR BLD AUTO: 41.4 % (ref 35–47)
HGB BLD-MCNC: 12.9 G/DL (ref 11.7–15.7)
IMM GRANULOCYTES # BLD: 0 10E3/UL
IMM GRANULOCYTES NFR BLD: 0 %
LYMPHOCYTES # BLD AUTO: 2.7 10E3/UL (ref 0.8–5.3)
LYMPHOCYTES NFR BLD AUTO: 33 %
MCH RBC QN AUTO: 27.6 PG (ref 26.5–33)
MCHC RBC AUTO-ENTMCNC: 31.2 G/DL (ref 31.5–36.5)
MCV RBC AUTO: 89 FL (ref 78–100)
MONOCYTES # BLD AUTO: 0.6 10E3/UL (ref 0–1.3)
MONOCYTES NFR BLD AUTO: 7 %
NEUTROPHILS # BLD AUTO: 4.6 10E3/UL (ref 1.6–8.3)
NEUTROPHILS NFR BLD AUTO: 56 %
PLATELET # BLD AUTO: 445 10E3/UL (ref 150–450)
RBC # BLD AUTO: 4.67 10E6/UL (ref 3.8–5.2)
WBC # BLD AUTO: 8.3 10E3/UL (ref 4–11)

## 2024-04-02 PROCEDURE — 36415 COLL VENOUS BLD VENIPUNCTURE: CPT

## 2024-04-02 PROCEDURE — 85025 COMPLETE CBC W/AUTO DIFF WBC: CPT

## 2024-04-02 PROCEDURE — 82728 ASSAY OF FERRITIN: CPT

## 2024-04-02 NOTE — LETTER
April 3, 2024      Keyonna De La Garza  [address] [facility] [address]        Dear ,    We are writing to inform you of your test results.    The ferritin level is normal and the complete blood counts are normal.      Bottom line:  The platelets are back down in the normal range.  We can recheck when you return for your routine annual visit.        Sincerely,     Dr. Mccabe       Resulted Orders   Ferritin   Result Value Ref Range    Ferritin 68 11 - 328 ng/mL   CBC with platelets and differential   Result Value Ref Range    WBC Count 8.3 4.0 - 11.0 10e3/uL    RBC Count 4.67 3.80 - 5.20 10e6/uL    Hemoglobin 12.9 11.7 - 15.7 g/dL    Hematocrit 41.4 35.0 - 47.0 %    MCV 89 78 - 100 fL    MCH 27.6 26.5 - 33.0 pg    MCHC 31.2 (L) 31.5 - 36.5 g/dL    RDW 12.6 10.0 - 15.0 %    Platelet Count 445 150 - 450 10e3/uL    % Neutrophils 56 %    % Lymphocytes 33 %    % Monocytes 7 %    % Eosinophils 4 %    % Basophils 0 %    % Immature Granulocytes 0 %    Absolute Neutrophils 4.6 1.6 - 8.3 10e3/uL    Absolute Lymphocytes 2.7 0.8 - 5.3 10e3/uL    Absolute Monocytes 0.6 0.0 - 1.3 10e3/uL    Absolute Eosinophils 0.3 0.0 - 0.7 10e3/uL    Absolute Basophils 0.0 0.0 - 0.2 10e3/uL    Absolute Immature Granulocytes 0.0 <=0.4 10e3/uL       If you have any questions or concerns, please call the clinic at the number listed above.       Sincerely,      Yosef Mccabe MD and Care Team

## 2024-04-02 NOTE — RESULT ENCOUNTER NOTE
The following letter pertains to your most recent diagnostic tests:    The ferritin level is normal and the complete blood counts are normal.        Bottom line:  The platelets are back down in the normal range.  We can recheck when you return for your routine annual visit.            Sincerely,    Dr. Mccabe

## 2024-09-16 ENCOUNTER — HOSPITAL ENCOUNTER (OUTPATIENT)
Dept: MAMMOGRAPHY | Facility: CLINIC | Age: 74
Discharge: HOME | End: 2024-09-16
Attending: INTERNAL MEDICINE | Admitting: INTERNAL MEDICINE
Payer: COMMERCIAL

## 2024-09-16 DIAGNOSIS — Z12.31 VISIT FOR SCREENING MAMMOGRAM: ICD-10-CM

## 2024-09-16 PROCEDURE — 77063 BREAST TOMOSYNTHESIS BI: CPT

## 2024-10-30 ENCOUNTER — OFFICE VISIT (OUTPATIENT)
Dept: INTERNAL MEDICINE | Facility: CLINIC | Age: 74
End: 2024-10-30
Payer: COMMERCIAL

## 2024-10-30 VITALS
WEIGHT: 141.5 LBS | SYSTOLIC BLOOD PRESSURE: 144 MMHG | TEMPERATURE: 99 F | HEART RATE: 72 BPM | BODY MASS INDEX: 29.57 KG/M2 | OXYGEN SATURATION: 99 % | DIASTOLIC BLOOD PRESSURE: 72 MMHG

## 2024-10-30 DIAGNOSIS — G89.29 CHRONIC RIGHT SHOULDER PAIN: Primary | ICD-10-CM

## 2024-10-30 DIAGNOSIS — M25.511 CHRONIC RIGHT SHOULDER PAIN: Primary | ICD-10-CM

## 2024-10-30 PROCEDURE — 91320 SARSCV2 VAC 30MCG TRS-SUC IM: CPT | Performed by: NURSE PRACTITIONER

## 2024-10-30 PROCEDURE — 90662 IIV NO PRSV INCREASED AG IM: CPT | Performed by: NURSE PRACTITIONER

## 2024-10-30 PROCEDURE — 90480 ADMN SARSCOV2 VAC 1/ONLY CMP: CPT | Performed by: NURSE PRACTITIONER

## 2024-10-30 PROCEDURE — 90471 IMMUNIZATION ADMIN: CPT | Performed by: NURSE PRACTITIONER

## 2024-10-30 PROCEDURE — 99213 OFFICE O/P EST LOW 20 MIN: CPT | Mod: 25 | Performed by: NURSE PRACTITIONER

## 2024-10-30 ASSESSMENT — ANXIETY QUESTIONNAIRES
5. BEING SO RESTLESS THAT IT IS HARD TO SIT STILL: NOT AT ALL
6. BECOMING EASILY ANNOYED OR IRRITABLE: NOT AT ALL
2. NOT BEING ABLE TO STOP OR CONTROL WORRYING: SEVERAL DAYS
IF YOU CHECKED OFF ANY PROBLEMS ON THIS QUESTIONNAIRE, HOW DIFFICULT HAVE THESE PROBLEMS MADE IT FOR YOU TO DO YOUR WORK, TAKE CARE OF THINGS AT HOME, OR GET ALONG WITH OTHER PEOPLE: NOT DIFFICULT AT ALL
7. FEELING AFRAID AS IF SOMETHING AWFUL MIGHT HAPPEN: NOT AT ALL
3. WORRYING TOO MUCH ABOUT DIFFERENT THINGS: SEVERAL DAYS
1. FEELING NERVOUS, ANXIOUS, OR ON EDGE: SEVERAL DAYS
GAD7 TOTAL SCORE: 3
GAD7 TOTAL SCORE: 3

## 2024-10-30 ASSESSMENT — PATIENT HEALTH QUESTIONNAIRE - PHQ9
5. POOR APPETITE OR OVEREATING: NOT AT ALL
SUM OF ALL RESPONSES TO PHQ QUESTIONS 1-9: 3

## 2024-10-30 ASSESSMENT — PAIN SCALES - GENERAL: PAINLEVEL_OUTOF10: EXTREME PAIN (8)

## 2024-10-30 NOTE — PROGRESS NOTES
"  Assessment & Plan     (M25.511,  G89.29) Chronic right shoulder pain  (primary encounter diagnosis)  Comment: Suspect rotator cuff pathology, given exam.  Continue acetaminophen.  Add topical diclofenac 4 times daily as needed.  She has been advised by her PCP to avoid oral NSAIDs due to GERD symptoms and, she thinks, renal function.  Up to 4 times daily.  Home exercises provided.  Discussed referral to PT; she would like to wait to see if the above interventions are helpful first.  Ultrasound ordered.  Discussed possibility of orthopedic referral, depending on findings.  Plan: US MSK Rotator Cuff, diclofenac (VOLTAREN) 1 %         topical gel      BMI  Estimated body mass index is 29.57 kg/m  as calculated from the following:    Height as of 1/15/24: 1.473 m (4' 10\").    Weight as of this encounter: 64.2 kg (141 lb 8 oz).   Weight management plan: Patient was referred to their PCP to discuss a diet and exercise plan.    See Patient Instructions        Subjective   Keyonna Yoder is a 74 year old, presenting for the following health issues:  Musculoskeletal Problem    History of Present Illness       Reason for visit:  Shoulder pain  Symptom onset:  More than a month  Symptoms include:  Pain  Symptom intensity:  Severe  Symptom progression:  Worsening  Had these symptoms before:  No  What makes it worse:  Raising my shoulder  What makes it better:  No   She is taking medications regularly.       Pain History  When did you first notice your pain? Early July, about 1 week after she started working with 2 lb weights  Have you seen this provider for your pain in the past? No   Where in your body do your have pain? Right shoulder  Are you seeing anyone else for your pain? No  What makes your pain better? Tried heat/cool therapy, Salonpas -- not helpful. Acetaminophen will reduce pain x6 hours, \"but it always returns.\"  What makes your pain worse? Certain motions: raising arm up, lifting even a gallon of milk, getting " "dressed/ADLs        10/4/2023     8:35 AM 10/30/2024    11:39 AM   PHQ-9 SCORE   PHQ-9 Total Score MyChart 9 (Mild depression)    PHQ-9 Total Score 9 3           10/30/2024    11:39 AM   JERRY-7 SCORE   Total Score 3           10/30/2024    11:38 AM   PEG Score   PEG Total Score 9       Chronic Pain - Initial Assessment:    How would you describe your pain? Aching, tingling  Have you had any recent changes to the severity or character of your pain? Progressively worsening; radiating down right arm. Now starting to have some numbness in her right hand. Very difficult to reach overhead, wash her hair, get dressed.  Is there an underlying cause that has been identified? Possibly an injury related to using 2 lb weights  Has your ability to work or do daily activities changed recently because of your pain? Adversely impacting ADLs  Which of these pain treatments have you tried? Cold, Heat, and Rest  Previous medication treatments:  Other - acetaminophen (Tylenol)    Wondering if her shoulder is displaced. She does not recall an event, but \"it feels out of place.\"    No recent imaging.  Has not seen PT or orthopedics for this.    Review of Systems  Constitutional, HEENT, cardiovascular, pulmonary, GI, , musculoskeletal, neuro, skin, endocrine and psych systems are negative, except as otherwise noted.      Objective    BP (!) 144/72   Pulse 72   Temp 99  F (37.2  C) (Temporal)   Wt 64.2 kg (141 lb 8 oz)   SpO2 99%   BMI 29.57 kg/m    Body mass index is 29.57 kg/m .  Physical Exam  Vitals and nursing note reviewed.   Constitutional:       General: She is not in acute distress.     Appearance: Normal appearance.   Cardiovascular:      Rate and Rhythm: Normal rate and regular rhythm.      Pulses: Normal pulses.      Heart sounds: Normal heart sounds. No murmur heard.     No friction rub. No gallop.   Pulmonary:      Effort: Pulmonary effort is normal. No respiratory distress.      Breath sounds: Normal breath sounds. No " wheezing, rhonchi or rales.   Musculoskeletal:         General: No swelling.      Right shoulder: Tenderness present. No swelling, deformity, effusion, bony tenderness or crepitus. Decreased range of motion. Decreased strength.      Left shoulder: Normal.      Comments: Right shoulder especially painful and limited with flexion, abduction, adduction, and internal/external rotation   Skin:     General: Skin is warm and dry.   Neurological:      General: No focal deficit present.      Mental Status: She is alert and oriented to person, place, and time.      Sensory: Sensory deficit: numbness and tingling into the right hand.      Motor: Weakness (right arm) present.      Deep Tendon Reflexes:      Reflex Scores:       Tricep reflexes are 2+ on the right side and 1+ on the left side.       Bicep reflexes are 2+ on the right side and 1+ on the left side.       Brachioradialis reflexes are 1+ on the right side and 1+ on the left side.  Psychiatric:         Mood and Affect: Mood normal.         Behavior: Behavior normal.         Thought Content: Thought content normal.         Judgment: Judgment normal.              Signed Electronically by: RAJINDER Spaulding CNP

## 2024-10-30 NOTE — PATIENT INSTRUCTIONS
- Continue acetaminophen as needed    - ADD topical diclofenac; apply to painful areas up to 4x/day as needed.    - Ice therapy, 15 minutes each time, up to 4 times daily for persistent pain and swelling.    - Knee brace as needed.    - Daily stretching and strengthening exercises. I am giving you a handout of exercises to try that should be helpful. Pick 2 or 3 to do each day. Be sure to pick both stretching and strengthening exercises each day.  *If exercises/stretching cause rebound pain, you have done too much (i.e. too many repetitions or intensity too high). Decrease intensity and/or number of repetitions the next time.

## 2024-12-16 DIAGNOSIS — E03.9 HYPOTHYROIDISM, UNSPECIFIED TYPE: ICD-10-CM

## 2024-12-16 RX ORDER — LEVOTHYROXINE SODIUM 100 UG/1
100 TABLET ORAL DAILY
Qty: 90 TABLET | Refills: 0 | Status: SHIPPED | OUTPATIENT
Start: 2024-12-16

## 2024-12-27 ENCOUNTER — ANCILLARY PROCEDURE (OUTPATIENT)
Dept: ULTRASOUND IMAGING | Facility: CLINIC | Age: 74
End: 2024-12-27
Attending: NURSE PRACTITIONER
Payer: MEDICARE

## 2024-12-27 DIAGNOSIS — M25.511 CHRONIC RIGHT SHOULDER PAIN: ICD-10-CM

## 2024-12-27 DIAGNOSIS — G89.29 CHRONIC RIGHT SHOULDER PAIN: ICD-10-CM

## 2024-12-27 PROCEDURE — 76881 US COMPL JOINT R-T W/IMG: CPT | Performed by: RADIOLOGY

## 2025-01-27 ENCOUNTER — OFFICE VISIT (OUTPATIENT)
Dept: FAMILY MEDICINE | Facility: CLINIC | Age: 75
End: 2025-01-27
Payer: MEDICARE

## 2025-01-27 ENCOUNTER — ANCILLARY PROCEDURE (OUTPATIENT)
Dept: GENERAL RADIOLOGY | Facility: CLINIC | Age: 75
End: 2025-01-27
Attending: INTERNAL MEDICINE
Payer: MEDICARE

## 2025-01-27 VITALS
HEIGHT: 58 IN | HEART RATE: 72 BPM | SYSTOLIC BLOOD PRESSURE: 171 MMHG | BODY MASS INDEX: 30.14 KG/M2 | OXYGEN SATURATION: 97 % | RESPIRATION RATE: 16 BRPM | DIASTOLIC BLOOD PRESSURE: 76 MMHG | WEIGHT: 143.6 LBS | TEMPERATURE: 98.4 F

## 2025-01-27 DIAGNOSIS — I10 BENIGN ESSENTIAL HYPERTENSION: ICD-10-CM

## 2025-01-27 DIAGNOSIS — M25.511 CHRONIC RIGHT SHOULDER PAIN: ICD-10-CM

## 2025-01-27 DIAGNOSIS — Z13.1 SCREENING FOR DIABETES MELLITUS: ICD-10-CM

## 2025-01-27 DIAGNOSIS — G89.29 CHRONIC RIGHT SHOULDER PAIN: ICD-10-CM

## 2025-01-27 DIAGNOSIS — Z13.220 LIPID SCREENING: ICD-10-CM

## 2025-01-27 DIAGNOSIS — Z00.00 ENCOUNTER FOR MEDICARE ANNUAL WELLNESS EXAM: Primary | ICD-10-CM

## 2025-01-27 DIAGNOSIS — E03.9 HYPOTHYROIDISM, UNSPECIFIED TYPE: ICD-10-CM

## 2025-01-27 DIAGNOSIS — Z78.0 ASYMPTOMATIC POSTMENOPAUSAL STATUS: ICD-10-CM

## 2025-01-27 LAB
ALBUMIN SERPL BCG-MCNC: 4.2 G/DL (ref 3.5–5.2)
ALP SERPL-CCNC: 104 U/L (ref 40–150)
ALT SERPL W P-5'-P-CCNC: 16 U/L (ref 0–50)
ANION GAP SERPL CALCULATED.3IONS-SCNC: 14 MMOL/L (ref 7–15)
AST SERPL W P-5'-P-CCNC: 20 U/L (ref 0–45)
BILIRUB SERPL-MCNC: 0.3 MG/DL
BUN SERPL-MCNC: 4.5 MG/DL (ref 8–23)
CALCIUM SERPL-MCNC: 9.3 MG/DL (ref 8.8–10.4)
CHLORIDE SERPL-SCNC: 106 MMOL/L (ref 98–107)
CHOLEST SERPL-MCNC: 154 MG/DL
CREAT SERPL-MCNC: 0.65 MG/DL (ref 0.51–0.95)
EGFRCR SERPLBLD CKD-EPI 2021: >90 ML/MIN/1.73M2
ERYTHROCYTE [DISTWIDTH] IN BLOOD BY AUTOMATED COUNT: 13.1 % (ref 10–15)
EST. AVERAGE GLUCOSE BLD GHB EST-MCNC: 120 MG/DL
FASTING STATUS PATIENT QL REPORTED: YES
FASTING STATUS PATIENT QL REPORTED: YES
GLUCOSE SERPL-MCNC: 93 MG/DL (ref 70–99)
HBA1C MFR BLD: 5.8 % (ref 0–5.6)
HCO3 SERPL-SCNC: 24 MMOL/L (ref 22–29)
HCT VFR BLD AUTO: 41 % (ref 35–47)
HDLC SERPL-MCNC: 69 MG/DL
HGB BLD-MCNC: 13 G/DL (ref 11.7–15.7)
LDLC SERPL CALC-MCNC: 73 MG/DL
MCH RBC QN AUTO: 28.2 PG (ref 26.5–33)
MCHC RBC AUTO-ENTMCNC: 31.7 G/DL (ref 31.5–36.5)
MCV RBC AUTO: 89 FL (ref 78–100)
NONHDLC SERPL-MCNC: 85 MG/DL
PLATELET # BLD AUTO: 413 10E3/UL (ref 150–450)
POTASSIUM SERPL-SCNC: 4.3 MMOL/L (ref 3.4–5.3)
PROT SERPL-MCNC: 7.8 G/DL (ref 6.4–8.3)
RBC # BLD AUTO: 4.61 10E6/UL (ref 3.8–5.2)
SODIUM SERPL-SCNC: 144 MMOL/L (ref 135–145)
TRIGL SERPL-MCNC: 58 MG/DL
TSH SERPL DL<=0.005 MIU/L-ACNC: 0.44 UIU/ML (ref 0.3–4.2)
WBC # BLD AUTO: 9.6 10E3/UL (ref 4–11)

## 2025-01-27 PROCEDURE — 85027 COMPLETE CBC AUTOMATED: CPT | Performed by: INTERNAL MEDICINE

## 2025-01-27 PROCEDURE — G2211 COMPLEX E/M VISIT ADD ON: HCPCS | Performed by: INTERNAL MEDICINE

## 2025-01-27 PROCEDURE — 80053 COMPREHEN METABOLIC PANEL: CPT | Performed by: INTERNAL MEDICINE

## 2025-01-27 PROCEDURE — 36415 COLL VENOUS BLD VENIPUNCTURE: CPT | Performed by: INTERNAL MEDICINE

## 2025-01-27 PROCEDURE — 73030 X-RAY EXAM OF SHOULDER: CPT | Mod: TC | Performed by: RADIOLOGY

## 2025-01-27 PROCEDURE — 80061 LIPID PANEL: CPT | Performed by: INTERNAL MEDICINE

## 2025-01-27 PROCEDURE — 84443 ASSAY THYROID STIM HORMONE: CPT | Performed by: INTERNAL MEDICINE

## 2025-01-27 PROCEDURE — G0439 PPPS, SUBSEQ VISIT: HCPCS | Performed by: INTERNAL MEDICINE

## 2025-01-27 PROCEDURE — 83036 HEMOGLOBIN GLYCOSYLATED A1C: CPT | Performed by: INTERNAL MEDICINE

## 2025-01-27 PROCEDURE — 99214 OFFICE O/P EST MOD 30 MIN: CPT | Mod: 25 | Performed by: INTERNAL MEDICINE

## 2025-01-27 RX ORDER — LEVOTHYROXINE SODIUM 100 UG/1
100 TABLET ORAL DAILY
Qty: 90 TABLET | Refills: 3 | Status: SHIPPED | OUTPATIENT
Start: 2025-01-27

## 2025-01-27 RX ORDER — AMLODIPINE AND VALSARTAN 5; 160 MG/1; MG/1
1 TABLET ORAL DAILY
Qty: 90 TABLET | Refills: 3 | Status: SHIPPED | OUTPATIENT
Start: 2025-01-27

## 2025-01-27 SDOH — HEALTH STABILITY: PHYSICAL HEALTH: ON AVERAGE, HOW MANY DAYS PER WEEK DO YOU ENGAGE IN MODERATE TO STRENUOUS EXERCISE (LIKE A BRISK WALK)?: 0 DAYS

## 2025-01-27 ASSESSMENT — SOCIAL DETERMINANTS OF HEALTH (SDOH)
HOW OFTEN DO YOU GET TOGETHER WITH FRIENDS OR RELATIVES?: TWICE A WEEK
HOW OFTEN DO YOU GET TOGETHER WITH FRIENDS OR RELATIVES?: TWICE A WEEK

## 2025-01-27 ASSESSMENT — PAIN SCALES - GENERAL: PAINLEVEL_OUTOF10: NO PAIN (0)

## 2025-01-27 NOTE — PROGRESS NOTES
Preventive Care Visit  [facility]  Yosef Mccabe MD, Internal Medicine  Jan 27, 2025      Assessment & Plan     Encounter for Medicare annual wellness exam      Chronic right shoulder pain  Shoulder pain and stiffness since July without specific antecedent injury or trauma  Onset after cleaning garage involving lifting etc  Check x-ray  If no advanced OA, then injection and then PT for severe rotator cuff syndrome  If advanced OA, then see orthopedics   Discussed with patient   - XR Shoulder Right 2 Views; Future  - OFFICE/OUTPT VISIT,ESTLEVL IV    Benign essential hypertension  New diagnosis   Discussed below as treatment options  Discussed risks and side effects and recommended follow up     - amLODIPine-valsartan (EXFORGE) 5-160 MG tablet; Take 1 tablet by mouth daily.  - OFFICE/OUTPT VISIT,EST,LEVL IV    Hypothyroidism, unspecified type  Recheck TFTs  - Comprehensive metabolic panel; Future  - CBC with platelets; Future  - TSH with free T4 reflex; Future  - levothyroxine (SYNTHROID/LEVOTHROID) 100 MCG tablet; Take 1 tablet (100 mcg) by mouth daily.  - OFFICE/OUTPT VISIT,ESTLEVL IV    Lipid screening    - Lipid panel reflex to direct LDL Fasting; Future    Screening for diabetes mellitus    - HEMOGLOBIN A1C; Future    Asymptomatic postmenopausal status    - DX Bone Density; Future    Patient has been advised of split billing requirements and indicates understanding: Yes        Counseling  Appropriate preventive services were addressed with this patient via screening, questionnaire, or discussion as appropriate for fall prevention, nutrition, physical activity, Tobacco-use cessation, social engagement, weight loss and cognition.  Checklist reviewing preventive services available has been given to the patient.  Reviewed patient's diet, addressing concerns and/or questions.   The patient was provided with written information regarding signs of hearing loss.   Information on urinary  incontinence and treatment options given to patient.       FUTURE APPOINTMENTS:       -  Return in about 3 weeks (around 2/17/2025) for recheck shoulder pain and blood pressure.     Subjective   Keyonna Yoder is a 74 year old, presenting for the following:  Physical        1/27/2025     7:53 AM   Additional Questions   Roomed by azeem ROSS          Health Care Directive  Patient has a Health Care Directive on file  Advance care planning document is on file and is current.      1/27/2025   General Health   How would you rate your overall physical health? Good   Feel stress (tense, anxious, or unable to sleep) Only a little   (!) STRESS CONCERN      1/27/2025   Nutrition   Diet: Regular (no restrictions)         1/27/2025   Exercise   Days per week of moderate/strenous exercise 0 days   (!) EXERCISE CONCERN      1/27/2025   Social Factors   Frequency of gathering with friends or relatives Twice a week   Worry food won't last until get money to buy more No    No   Food not last or not have enough money for food? No    No   Do you have housing? (Housing is defined as stable permanent housing and does not include staying ouside in a car, in a tent, in an abandoned building, in an overnight shelter, or couch-surfing.) Yes    Yes   Are you worried about losing your housing? No    No   Lack of transportation? No    No   Unable to get utilities (heat,electricity)? No    No       Multiple values from one day are sorted in reverse-chronological order         1/27/2025   Fall Risk   Fallen 2 or more times in the past year? No     No    No   Trouble with walking or balance? No     No    No       Proxy-reported    Multiple values from one day are sorted in reverse-chronological order          1/27/2025   Activities of Daily Living- Home Safety   Needs help with the following daily activites None of the above   Safety concerns in the home None of the above         1/27/2025   Dental   Dentist two times every year? Yes          1/27/2025   Hearing Screening   Hearing concerns? (!) I FEEL THAT PEOPLE ARE MUMBLING OR NOT SPEAKING CLEARLY.    (!) TROUBLE UNDESTANDING A SPEAKER IN A PUBLIC MEETING OR [employer].    (!) TROUBLE UNDERSTANDING SOFT OR WHISPERED SPEECH.       Multiple values from one day are sorted in reverse-chronological order         1/27/2025   Driving Risk Screening   Patient/family members have concerns about driving No         1/27/2025   General Alertness/Fatigue Screening   Have you been more tired than usual lately? No         1/27/2025   Urinary Incontinence Screening   Bothered by leaking urine in past 6 months Yes         1/27/2025   TB Screening   Were you born outside of the [place]? No         Today's PHQ-2 Score:       1/27/2025     8:33 AM   PHQ-2 ( 1999 [employer])   Q1: Little interest or pleasure in doing things 0    Q2: Feeling down, depressed or hopeless 0    PHQ-2 Score 0    Q1: Little interest or pleasure in doing things Not at all   Q2: Feeling down, depressed or hopeless Not at all   PHQ-2 Score 0       Proxy-reported           1/27/2025   Substance Use   Alcohol more than 3/day or more than 7/wk No   Do you have a current opioid prescription? No   How severe/bad is pain from 1 to 10? 8/10   Do you use any other substances recreationally? No     Social History     Tobacco Use    Smoking status: Never    Smokeless tobacco: Never   Vaping Use    Vaping status: Never Used   Substance Use Topics    Alcohol use: Yes     Alcohol/week: 0.0 standard drinks of alcohol     Comment: 2 drinks per week    Drug use: No           9/16/2024   LAST FHS-7 RESULTS   1st degree relative breast or ovarian cancer No   Any relative bilateral breast cancer No   Any male have breast cancer No   Any ONE woman have BOTH breast AND ovarian cancer No   Any woman with breast cancer before 50yrs No   2 or more relatives with breast AND/OR ovarian cancer No   2 or more relatives with breast AND/OR bowel cancer No            ASCVD  Risk   The 10-year ASCVD risk score (Ray TORRES, et al., 2019) is: 27.9%    Values used to calculate the score:      Age: 74 years      Sex: Female      Is Non- : No      Diabetic: No      Tobacco smoker: No      Systolic Blood Pressure: 171 mmHg      Is BP treated: Yes      HDL Cholesterol: 78 mg/dL      Total Cholesterol: 189 mg/dL            Reviewed and updated as needed this visit by Provider                    Past Medical History:   Diagnosis Date    Abdominal pain, other specified site 2013    RUQ    Gallstones 2013    GERD (gastroesophageal reflux disease)     Hypothyroidism      Past Surgical History:   Procedure Laterality Date    COLONOSCOPY      LAPAROSCOPIC CHOLECYSTECTOMY  4/24/2013    Procedure: LAPAROSCOPIC CHOLECYSTECTOMY;  Laparoscopic cholecystectomy.;  Surgeon: Bong Pyle MD;  Location: [facility] OR     BP Readings from Last 3 Encounters:   01/27/25 (!) 171/76   10/30/24 (!) 144/72   01/15/24 139/85    Wt Readings from Last 3 Encounters:   01/27/25 65.1 kg (143 lb 9.6 oz)   10/30/24 64.2 kg (141 lb 8 oz)   01/15/24 63.5 kg (139 lb 14.4 oz)                  Patient Active Problem List   Diagnosis    Hypothyroidism    CARDIOVASCULAR SCREENING; LDL GOAL LESS THAN 130    GERD (gastroesophageal reflux disease)    Osteopenia    Cholecystitis    Lumbago    Benign essential hypertension     Past Surgical History:   Procedure Laterality Date    COLONOSCOPY      LAPAROSCOPIC CHOLECYSTECTOMY  4/24/2013    Procedure: LAPAROSCOPIC CHOLECYSTECTOMY;  Laparoscopic cholecystectomy.;  Surgeon: Bong Pyle MD;  Location:  OR       Social History     Tobacco Use    Smoking status: Never    Smokeless tobacco: Never   Substance Use Topics    Alcohol use: Yes     Alcohol/week: 0.0 standard drinks of alcohol     Comment: 2 drinks per week     Family History   Problem Relation Age of Onset    C.A.D. Mother     Cerebrovascular Disease Father          Current Outpatient  Medications   Medication Sig Dispense Refill    acetaminophen (TYLENOL) 500 MG tablet Take 1,000 mg by mouth every 8 hours as needed for mild pain      amLODIPine-valsartan (EXFORGE) 5-160 MG tablet Take 1 tablet by mouth daily. 90 tablet 3    diclofenac (VOLTAREN) 1 % topical gel Apply 2 g topically 4 times daily as needed for moderate pain. 100 g 3    estradiol (ESTRACE) 0.1 MG/GM vaginal cream Place 2 g vaginally twice a week (Patient taking differently: Place vaginally twice a week.) 42.5 g 11    Famotidine (PEPCID PO) Every morning      levothyroxine (SYNTHROID/LEVOTHROID) 100 MCG tablet Take 1 tablet (100 mcg) by mouth daily. 90 tablet 3     Current providers sharing in care for this patient include:  Patient Care Team:  Yosef Mccabe MD as PCP - General (Internal Medicine)  Yosef Mccabe MD as Assigned PCP    The following health maintenance items are reviewed in Epic and correct as of today:  Health Maintenance   Topic Date Due    ANNUAL REVIEW OF HM ORDERS  10/04/2024    BMP  01/15/2025    TSH W/FREE T4 REFLEX  01/15/2025    MEDICARE ANNUAL WELLNESS VISIT  01/27/2026    FALL RISK ASSESSMENT  01/27/2026    RSV VACCINE (1 - 1-dose 75+ series) 09/02/2026    MAMMO SCREENING  09/16/2026    GLUCOSE  01/15/2027    LIPID  01/15/2029    COLORECTAL CANCER SCREENING  06/24/2029    ADVANCE CARE PLANNING  01/27/2030    DEXA  08/03/2030    DTAP/TDAP/TD IMMUNIZATION (4 - Td or Tdap) 10/27/2032    HEPATITIS C SCREENING  Completed    PHQ-2 (once per calendar year)  Completed    INFLUENZA VACCINE  Completed    Pneumococcal Vaccine: 50+ Years  Completed    ZOSTER IMMUNIZATION  Completed    COVID-19 Vaccine  Completed    HPV IMMUNIZATION  Aged Out    MENINGITIS IMMUNIZATION  Aged Out    RSV MONOCLONAL ANTIBODY  Aged Out       A 10 organ systems ROS is negative other than any pertinent positives or negatives previously stated.      Objective    Exam  BP (!) 171/76   Pulse 72   Temp 98.4  F (36.9  C) (Temporal)   Resp  "16   Ht 1.47 m (4' 9.87\")   Wt 65.1 kg (143 lb 9.6 oz)   SpO2 97%   BMI 30.14 kg/m     Estimated body mass index is 30.14 kg/m  as calculated from the following:    Height as of this encounter: 1.47 m (4' 9.87\").    Weight as of this encounter: 65.1 kg (143 lb 9.6 oz).    Physical Exam  GENERAL: alert and no distress  EYES: Eyes grossly normal to inspection, PERRL and conjunctivae and sclerae normal  HENT: ear canals and TM's normal, nose and mouth without ulcers or lesions  NECK: no adenopathy, no asymmetry, masses, or scars  RESP: lungs clear to auscultation - no rales, rhonchi or wheezes  CV: regular rate and rhythm, normal S1 S2, no S3 or S4, no murmur, click or rub, no peripheral edema  ABDOMEN: soft, nontender, no hepatosplenomegaly, no masses and bowel sounds normal  MS: right should impingement sign, rotator cuff strength seems intact on right   SKIN: no suspicious lesions or rashes  NEURO: Normal strength and tone, mentation intact and speech normal  PSYCH: mentation appears normal, affect normal/bright        1/27/2025   Mini Cog   Mini-Cog Not Completed (choose reason) Patient declines         Vision Screen  PART B 12/2024  Patient wears corrective lenses (select all that apply): Worn during vision screen  Vision Screen Results: Pass    Right Eye 20/32   Left Eye 20/32   Both Eyes 20/25           Signed Electronically by: Yosef Mccabe MD      The longitudinal plan of care for the diagnosis(es)/condition(s) as documented were addressed during this visit. Due to the added complexity in care, I will continue to support Keyonna Yoder in the subsequent management and with ongoing continuity of care.   "

## 2025-01-27 NOTE — LETTER
January 27, 2025      Keyonna De La Garza  [address] [facility] [address]        Dear ,    We are writing to inform you of your test results.    The following letter pertains to your most recent diagnostic tests:     -Liver and gallbladder tests are normal for you. (ALT,AST, Alk phos, bilirubin), kidney function is normal for you (Creatinine, GFR), Sodium is normal, Potassium is normal for you, Calcium is normal for you, Glucose (blood sugar) is normal for you.      -Your cholesterol panel looks healthy.     -TSH (thyroid stimulating hormone) level is normal which indicates normal circulating thyroid hormone levels.      -Your hemoglobin A1c test which averages your blood sugars over the last 3 months returned demonstrating mild prediabetes.  Cutting back on sugary foods and drinks can make sure this does not progress.      -Your complete blood counts including your hemoglobin returned normal for you.            Follow up:  We will see you in a few weeks to follow up on your shoulder and blood pressure.       Sincerely,     Dr. Mccabe     Resulted Orders   Lipid panel reflex to direct LDL Fasting   Result Value Ref Range    Cholesterol 154 <200 mg/dL    Triglycerides 58 <150 mg/dL    Direct Measure HDL 69 >=50 mg/dL    LDL Cholesterol Calculated 73 <100 mg/dL    Non HDL Cholesterol 85 <130 mg/dL    Patient Fasting > 8hrs? Yes     Narrative    Cholesterol  Desirable: < 200 mg/dL  Borderline High: 200 - 239 mg/dL  High: >= 240 mg/dL    Triglycerides  Normal: < 150 mg/dL  Borderline High: 150 - 199 mg/dL  High: 200-499 mg/dL  Very High: >= 500 mg/dL    Direct Measure HDL  Female: >= 50 mg/dL   Male: >= 40 mg/dL    LDL Cholesterol  Desirable: < 100 mg/dL  Above Desirable: 100 - 129 mg/dL   Borderline High: 130 - 159 mg/dL   High:  160 - 189 mg/dL   Very High: >= 190 mg/dL    Non HDL Cholesterol  Desirable: < 130 mg/dL  Above Desirable: 130 - 159 mg/dL  Borderline High: 160 - 189 mg/dL  High: 190  - 219 mg/dL  Very High: >= 220 mg/dL   Comprehensive metabolic panel   Result Value Ref Range    Sodium 144 135 - 145 mmol/L    Potassium 4.3 3.4 - 5.3 mmol/L    Carbon Dioxide (CO2) 24 22 - 29 mmol/L    Anion Gap 14 7 - 15 mmol/L    Urea Nitrogen 4.5 (L) 8.0 - 23.0 mg/dL    Creatinine 0.65 0.51 - 0.95 mg/dL    GFR Estimate >90 >60 mL/min/1.73m2      Comment:      eGFR calculated using 2021 CKD-EPI equation.    Calcium 9.3 8.8 - 10.4 mg/dL      Comment:      Reference intervals for this test were updated on 7/16/2024 to reflect our healthy population more accurately. There may be differences in the flagging of prior results with similar values performed with this method. Those prior results can be interpreted in the context of the updated reference intervals.    Chloride 106 98 - 107 mmol/L    Glucose 93 70 - 99 mg/dL    Alkaline Phosphatase 104 40 - 150 U/L    AST 20 0 - 45 U/L    ALT 16 0 - 50 U/L    Protein Total 7.8 6.4 - 8.3 g/dL    Albumin 4.2 3.5 - 5.2 g/dL    Bilirubin Total 0.3 <=1.2 mg/dL    Patient Fasting > 8hrs? Yes    CBC with platelets   Result Value Ref Range    WBC Count 9.6 4.0 - 11.0 10e3/uL    RBC Count 4.61 3.80 - 5.20 10e6/uL    Hemoglobin 13.0 11.7 - 15.7 g/dL    Hematocrit 41.0 35.0 - 47.0 %    MCV 89 78 - 100 fL    MCH 28.2 26.5 - 33.0 pg    MCHC 31.7 31.5 - 36.5 g/dL    RDW 13.1 10.0 - 15.0 %    Platelet Count 413 150 - 450 10e3/uL   HEMOGLOBIN A1C   Result Value Ref Range    Estimated Average Glucose 120 (H) <117 mg/dL    Hemoglobin A1C 5.8 (H) 0.0 - 5.6 %      Comment:      Normal <5.7%   Prediabetes 5.7-6.4%    Diabetes 6.5% or higher     Note: Adopted from ADA consensus guidelines.   TSH with free T4 reflex   Result Value Ref Range    TSH 0.44 0.30 - 4.20 uIU/mL

## 2025-01-27 NOTE — PATIENT INSTRUCTIONS
Start the medication amlodipine-valsartan (Exforge) for your blood pressure.  Our goal is to get your blood pressure consistently less than 130/80.  Start by taking one-half tablet at bedtime.  If after 3 days on that dose the blood pressures are still greater than 130/80 and the medication does not make you feel too dizzy, then increase the dose to one whole tablet once daily.    You should get the RSV (Respiratory Syncytial Virus) vaccine at a pharmacy.       Patient Education   Preventive Care Advice   This is general advice given by our system to help you stay healthy. However, your care team may have specific advice just for you. Please talk to your care team about your preventive care needs.  Nutrition  Eat 5 or more servings of fruits and vegetables each day.  Try wheat bread, brown rice and whole grain pasta (instead of white bread, rice, and pasta).  Get enough calcium and vitamin D. Check the label on foods and aim for 100% of the RDA (recommended daily allowance).  Lifestyle  Exercise at least 150 minutes each week  (30 minutes a day, 5 days a week).  Do muscle strengthening activities 2 days a week. These help control your weight and prevent disease.  No smoking.  Wear sunscreen to prevent skin cancer.  Have a dental exam and cleaning every 6 months.  Yearly exams  See your health care team every year to talk about:  Any changes in your health.  Any medicines your care team has prescribed.  Preventive care, family planning, and ways to prevent chronic diseases.  Shots (vaccines)   HPV shots (up to age 26), if you've never had them before.  Hepatitis B shots (up to age 59), if you've never had them before.  COVID-19 shot: Get this shot when it's due.  Flu shot: Get a flu shot every year.  Tetanus shot: Get a tetanus shot every 10 years.  Pneumococcal, hepatitis A, and RSV shots: Ask your care team if you need these based on your risk.  Shingles shot (for age 50 and up)  General health tests  Diabetes  screening:  Starting at age 35, Get screened for diabetes at least every 3 years.  If you are younger than age 35, ask your care team if you should be screened for diabetes.  Cholesterol test: At age 39, start having a cholesterol test every 5 years, or more often if advised.  Bone density scan (DEXA): At age 50, ask your care team if you should have this scan for osteoporosis (brittle bones).  Hepatitis C: Get tested at least once in your life.  STIs (sexually transmitted infections)  Before age 24: Ask your care team if you should be screened for STIs.  After age 24: Get screened for STIs if you're at risk. You are at risk for STIs (including HIV) if:  You are sexually active with more than one person.  You don't use condoms every time.  You or a partner was diagnosed with a sexually transmitted infection.  If you are at risk for HIV, ask about PrEP medicine to prevent HIV.  Get tested for HIV at least once in your life, whether you are at risk for HIV or not.  Cancer screening tests  Cervical cancer screening: If you have a cervix, begin getting regular cervical cancer screening tests starting at age 21.  Breast cancer scan (mammogram): If you've ever had breasts, begin having regular mammograms starting at age 40. This is a scan to check for breast cancer.  Colon cancer screening: It is important to start screening for colon cancer at age 45.  Have a colonoscopy test every 10 years (or more often if you're at risk) Or, ask your provider about stool tests like a FIT test every year or Cologuard test every 3 years.  To learn more about your testing options, visit:   .  For help making a decision, visit:   https://bit.ly/jv43775.  Prostate cancer screening test: If you have a prostate, ask your care team if a prostate cancer screening test (PSA) at age 55 is right for you.  Lung cancer screening: If you are a current or former smoker ages 50 to 80, ask your care team if ongoing lung cancer screenings are right for  you.  For informational purposes only. Not to replace the advice of your health care provider. Copyright   2023 [facility]. All rights reserved. Clinically reviewed by the  [facility]. [place] 610779 - REV 01/24.  Hearing Loss: Care Instructions  Overview     Hearing loss is a sudden or slow decrease in how well you hear. It can range from slight to profound. Permanent hearing loss can occur with aging. It also can happen when you are exposed long-term to loud noise. Examples include listening to loud music, riding motorcycles, or being around other loud machines.  Hearing loss can affect your work and home life. It can make you feel lonely or depressed. You may feel that you have lost your independence. But hearing aids and other devices can help you hear better and feel connected to others.  Follow-up care is a key part of your treatment and safety. Be sure to make and go to all appointments, and call your doctor if you are having problems. It's also a good idea to know your test results and keep a list of the medicines you take.  How can you care for yourself at home?  Avoid loud noises whenever possible. This helps keep your hearing from getting worse.  Always wear hearing protection around loud noises.  Wear a hearing aid as directed.  A professional can help you pick a hearing aid that will work best for you.  You can also get hearing aids over the counter for mild to moderate hearing loss.  Have hearing tests as your doctor suggests. They can show whether your hearing has changed. Your hearing aid may need to be adjusted.  Use other devices as needed. These may include:  Telephone amplifiers and hearing aids that can connect to a television, stereo, radio, or microphone.  Devices that use lights or vibrations. These alert you to the doorbell, a ringing telephone, or a baby monitor.  Television closed-captioning. This shows the words at the bottom of the screen. Most  "new TVs can do this.  TTY (text telephone). This lets you type messages back and forth on the telephone instead of talking or listening. These devices are also called TDD. When messages are typed on the keyboard, they are sent over the phone line to a receiving TTY. The message is shown on a monitor.  Use text messaging, social media, and email if it is hard for you to communicate by telephone.  Try to learn a listening technique called speechreading. It is not lipreading. You pay attention to people's gestures, expressions, posture, and tone of voice. These clues can help you understand what a person is saying. Face the person you are talking to, and have them face you. Make sure the lighting is good. You need to see the other person's face clearly.  Think about counseling if you need help to adjust to your hearing loss.  When should you call for help?  Watch closely for changes in your health, and be sure to contact your doctor if:    You think your hearing is getting worse.     You have new symptoms, such as dizziness or nausea.   Where can you learn more?  Go to https://www.[employer].net/patiented  Enter R798 in the search box to learn more about \"Hearing Loss: Care Instructions.\"  Current as of: September 27, 2023  Content Version: 14.3    2024 [employer].   Care instructions adapted under license by your healthcare professional. If you have questions about a medical condition or this instruction, always ask your healthcare professional. [employer] disclaims any warranty or liability for your use of this information.    Bladder Training: Care Instructions  Your Care Instructions     Bladder training is used to treat urge incontinence and stress incontinence. Urge incontinence means that the need to urinate comes on so fast that you can't get to a toilet in time. Stress incontinence means that you leak urine because of pressure on your bladder. For example, it may happen when you laugh, " cough, or lift something heavy.  Bladder training can increase how long you can wait before you have to urinate. It can also help your bladder hold more urine. And it can give you better control over the urge to urinate.  It is important to remember that bladder training takes a few weeks to a few months to make a difference. You may not see results right away, but don't give up.  Follow-up care is a key part of your treatment and safety. Be sure to make and go to all appointments, and call your doctor if you are having problems. It's also a good idea to know your test results and keep a list of the medicines you take.  How can you care for yourself at home?  Work with your doctor to come up with a bladder training program that is right for you. You may use one or more of the following methods.  Delayed urination  In the beginning, try to keep from urinating for 5 minutes after you first feel the need to go.  While you wait, take deep, slow breaths to relax. Kegel exercises can also help you delay the need to go to the bathroom.  After some practice, when you can easily wait 5 minutes to urinate, try to wait 10 minutes before you urinate.  Slowly increase the waiting period until you are able to control when you have to urinate.  Scheduled urination  Empty your bladder when you first wake up in the morning.  Schedule times throughout the day when you will urinate.  Start by going to the bathroom every hour, even if you don't need to go.  Slowly increase the time between trips to the bathroom.  When you have found a schedule that works well for you, keep doing it.  If you wake up during the night and have to urinate, do it. Apply your schedule to waking hours only.  Kegel exercises  These tighten and strengthen pelvic muscles, which can help you control the flow of urine. (If doing these exercises causes pain, stop doing them and talk with your doctor.) To do Kegel exercises:  Squeeze your muscles as if you were  "trying not to pass gas. Or squeeze your muscles as if you were stopping the flow of urine. Your belly, legs, and buttocks shouldn't move.  Hold the squeeze for 3 seconds, then relax for 5 to 10 seconds.  Start with 3 seconds, then add 1 second each week until you are able to squeeze for 10 seconds.  Repeat the exercise 10 times a session. Do 3 to 8 sessions a day.  When should you call for help?  Watch closely for changes in your health, and be sure to contact your doctor if:    Your incontinence is getting worse.     You do not get better as expected.   Where can you learn more?  Go to https://www.[employer].net/patiented  Enter V684 in the search box to learn more about \"Bladder Training: Care Instructions.\"  Current as of: April 30, 2024  Content Version: 14.3    2024 [employer].   Care instructions adapted under license by your healthcare professional. If you have questions about a medical condition or this instruction, always ask your healthcare professional. [employer] disclaims any warranty or liability for your use of this information.       "

## 2025-01-27 NOTE — RESULT ENCOUNTER NOTE
The following letter pertains to your most recent diagnostic tests:    Good news! The shoulder x-ray does NOT show advanced osteoarthritis (degenerative joint disease) of the right shoulder joint.    I think that physical therapy directed at rehabilitating the muscles of your rotator cuff is what is likely to help your shoulder the most, but before you do the PT exercises, I would recommend a cortisone injection in the right shoulder joint to reduce pain and inflammation that may make participating in physical therapy too difficult.    I have asked the sports medicine schedulers to call you to arrange for you to see a sports medicine provider who can perform the injection under ultrasound guidance. You could also call them [phone] to schedule an appointment.    You could start physical therapy for your shoulder 1 week after receiving the injection.      Call [phone] to schedule a physical therapy appointment.       Sincerely,    Dr. Mccabe

## 2025-01-28 ENCOUNTER — PATIENT OUTREACH (OUTPATIENT)
Dept: CARE COORDINATION | Facility: CLINIC | Age: 75
End: 2025-01-28
Payer: COMMERCIAL

## 2025-02-03 ENCOUNTER — TELEPHONE (OUTPATIENT)
Dept: FAMILY MEDICINE | Facility: CLINIC | Age: 75
End: 2025-02-03
Payer: COMMERCIAL

## 2025-02-03 NOTE — TELEPHONE ENCOUNTER
To PCP:    Amlodipine, had to pay out of pocket.  Patient is looking for NPI number for Dr. Mccabe for this form she received from insurance.    [employer] did not kick in to cover this so she is trying to fill out a reimbursement form for the Amlodipine so she can get her money back.    Please advise, thank you.    Danielle Saenz RN  [facility] Internal Medicine

## 2025-02-06 ENCOUNTER — OFFICE VISIT (OUTPATIENT)
Dept: ORTHOPEDICS | Facility: CLINIC | Age: 75
End: 2025-02-06
Attending: INTERNAL MEDICINE
Payer: MEDICARE

## 2025-02-06 DIAGNOSIS — G89.29 CHRONIC RIGHT SHOULDER PAIN: ICD-10-CM

## 2025-02-06 DIAGNOSIS — M25.511 CHRONIC RIGHT SHOULDER PAIN: ICD-10-CM

## 2025-02-06 DIAGNOSIS — M67.911 TENDINOPATHY OF ROTATOR CUFF, RIGHT: Primary | ICD-10-CM

## 2025-02-06 RX ORDER — TRIAMCINOLONE ACETONIDE 40 MG/ML
40 INJECTION, SUSPENSION INTRA-ARTICULAR; INTRAMUSCULAR
Status: COMPLETED | OUTPATIENT
Start: 2025-02-06 | End: 2025-02-06

## 2025-02-06 RX ORDER — LIDOCAINE HYDROCHLORIDE 10 MG/ML
4 INJECTION, SOLUTION INFILTRATION; PERINEURAL
Status: COMPLETED | OUTPATIENT
Start: 2025-02-06 | End: 2025-02-06

## 2025-02-06 RX ADMIN — TRIAMCINOLONE ACETONIDE 40 MG: 40 INJECTION, SUSPENSION INTRA-ARTICULAR; INTRAMUSCULAR at 16:13

## 2025-02-06 RX ADMIN — LIDOCAINE HYDROCHLORIDE 4 ML: 10 INJECTION, SOLUTION INFILTRATION; PERINEURAL at 16:13

## 2025-02-06 NOTE — LETTER
2025      Keyonna De La Garza  [address] [facility] [address]      Dear Colleague,    Thank you for referring your patient, Keyonna De La Garza, to the [facility] SPORTS MEDICINE [facility]. Please see a copy of my visit note below.    PROCEDURE ENCOUNTER    [facility]  Ga Montgomery DO  2025     Name: Keyonna De La Garza  MRN: [id number]  Age: 74 year old  : 1950    Referring provider: Dr. Yosef Mccabe MD  Diagnosis: (M25.511,  G89.29) Chronic right shoulder pain, Rotator cuff tendinopathy right shoulder    Right Subacromial Bursa - Ultrasound Guided  The patient was informed of the risks and the benefits of the procedure and a written consent was signed.  The patient s right shoulder was prepped with chlorhexidine in sterile fashion.   40 mg of triamcinolone suspension was drawn up into a 5 mL syringe with 4 mL of 1% lidocaine.  Injection was performed using sterile technique.  Under ultrasound guidance a 1.5-inch 22-gauge needle was used to enter the subacromial bursa.  Anterolateral approach was used with arm held in Crass position.  Needle placement was visualized and documented with ultrasound.  Ultrasound visualization was necessary to ensure placement in to the bursa and not the rotator cuff tendon which could potentially cause further tendon damage.  Injection performed long axis to the probe.  Injection solution visualized within the bursal space.  Images were permanently stored for the patient's record.  There were no complications. The patient tolerated the procedure well. There was negligible bleeding. Handout provided detailing post-injection instructions.  Start formal physical therapy which was prescribed by Dr. Mccabe in 2 weeks.  I emphasized the importance of physical therapy and the overall long-term benefits from injection and improving tendon health.  Ga Montgomery DO CAM  Primary Care Sports Medicine  [facility]  [facility]        Large Joint Injection/Arthocentesis: R subacromial bursa    Date/Time: 2/6/2025 4:13 PM    Performed by: Ga Montgomery DO  Authorized by: Ga Montgomery DO    Indications:  Pain  Needle Size:  25 G  Guidance: ultrasound    Approach:  Anterolateral  Location:  Shoulder      Site:  R subacromial bursa  Medications:  40 mg triamcinolone 40 MG/ML; 4 mL lidocaine 1 %  Outcome:  Tolerated well, no immediate complications  Procedure discussed: discussed risks, benefits, and alternatives    Consent Given by:  Patient  Timeout: timeout called immediately prior to procedure    Prep: patient was prepped and draped in usual sterile fashion     Ultrasound was used to ensure safe and accurate needle placement and injection. Ultrasound images of the procedure were permanently stored.            Again, thank you for allowing me to participate in the care of your patient.        Sincerely,        Ga Montgomery DO    Electronically signed

## 2025-02-06 NOTE — PROGRESS NOTES
PROCEDURE ENCOUNTER    [facility]  Sports Medicine Clinic  Ga Montgomery DO  2025     Name: Keyonna De La Garza  MRN: [id number]  Age: 74 year old  : 1950    Referring provider: Dr. Yosef Mccabe MD  Diagnosis: (M25.511,  G89.29) Chronic right shoulder pain, Rotator cuff tendinopathy right shoulder    Right Subacromial Bursa - Ultrasound Guided  The patient was informed of the risks and the benefits of the procedure and a written consent was signed.  The patient s right shoulder was prepped with chlorhexidine in sterile fashion.   40 mg of triamcinolone suspension was drawn up into a 5 mL syringe with 4 mL of 1% lidocaine.  Injection was performed using sterile technique.  Under ultrasound guidance a 1.5-inch 22-gauge needle was used to enter the subacromial bursa.  Anterolateral approach was used with arm held in Crass position.  Needle placement was visualized and documented with ultrasound.  Ultrasound visualization was necessary to ensure placement in to the bursa and not the rotator cuff tendon which could potentially cause further tendon damage.  Injection performed long axis to the probe.  Injection solution visualized within the bursal space.  Images were permanently stored for the patient's record.  There were no complications. The patient tolerated the procedure well. There was negligible bleeding. Handout provided detailing post-injection instructions.  Start formal physical therapy which was prescribed by Dr. Mccabe in 2 weeks.  I emphasized the importance of physical therapy and the overall long-term benefits from injection and improving tendon health.  Ga Montgomery DO CAQSM  Primary Care Sports Medicine  [facility]        Large Joint Injection/Arthocentesis: R subacromial bursa    Date/Time: 2025 4:13 PM    Performed by: Ga Montgomery DO  Authorized by: Ga Montgomery DO    Indications:  Pain  Needle Size:  25 G  Guidance: ultrasound     Approach:  Anterolateral  Location:  Shoulder      Site:  R subacromial bursa  Medications:  40 mg triamcinolone 40 MG/ML; 4 mL lidocaine 1 %  Outcome:  Tolerated well, no immediate complications  Procedure discussed: discussed risks, benefits, and alternatives    Consent Given by:  Patient  Timeout: timeout called immediately prior to procedure    Prep: patient was prepped and draped in usual sterile fashion     Ultrasound was used to ensure safe and accurate needle placement and injection. Ultrasound images of the procedure were permanently stored.

## 2025-02-12 ENCOUNTER — HOSPITAL ENCOUNTER (OUTPATIENT)
Dept: BONE DENSITY | Facility: CLINIC | Age: 75
Discharge: HOME | End: 2025-02-12
Attending: INTERNAL MEDICINE
Payer: MEDICARE

## 2025-02-12 DIAGNOSIS — Z78.0 ASYMPTOMATIC POSTMENOPAUSAL STATUS: ICD-10-CM

## 2025-02-12 PROCEDURE — 77080 DXA BONE DENSITY AXIAL: CPT

## 2025-02-14 NOTE — RESULT ENCOUNTER NOTE
The following letter pertains to your most recent diagnostic tests:    There has been some decrease in bone mineral density since your bone mineral density was last checked 10 years ago.   This is to be expected over time.  The bone mineral density is low, but the statistical risk for bone fracture is not high enough to warrant consideration of prescription medications to mitigate fracture risk.  Since there is low bone mineral density (osteopenia), it is recommended that you eat a diet rich in calcium including dairy products and green leafy vegetables.  In addition to this, it would be advisable to supplement vitamin D.  I would recommend 1000 international units of vitamin D daily.  Weightbearing exercise including walking and lifting light weights is also very helpful for maintaining bone health.    Sincerely,    Dr. Mccabe

## 2025-02-14 NOTE — PROGRESS NOTES
PHYSICAL THERAPY EVALUATION  Type of Visit: Evaluation              Subjective         Presenting condition or subjective complaint: rotor cuff right shoulder  Pt reports having R shoulder pain and stiffness since July 2024. She does not recall a certain injury or trauma that caused the pain but it did gradual onset after cleaning her garage and lifting things. She received an injection in the R shoulder on 2/6/2025. She notes significant improvement in shoulder after injection.  Date of onset: 01/27/25    Relevant medical history: Arthritis; Hearing problems; Osteoporosis; Thyroid problems   Dates & types of surgery: none    Prior diagnostic imaging/testing results: X-ray     X-ray  IMPRESSION: Normal alignment. No fracture. Mild degenerative arthrosis AC joint. Degenerative changes cervical and thoracic spine.     US:  Impression:      1. Supraspinatus tendinosis with low to moderate grade intrasubstance  tearing of the anterior and middle fibers at the footprint.     2. Low to moderate grade intrasubstance tearing of the upper  subscapularis at the footprint.     3. Tendinosis, tenosynovitis, and intrasubstance tearing of the long  head of the biceps tendon.    Prior therapy history for the same diagnosis, illness or injury: No      Living Environment  Social support: Alone   Type of home: Apartment/condo; 1 level   Stairs to enter the home: No       Ramp: No   Stairs inside the home: No       Help at home: None  Equipment owned:       Employment: No    Hobbies/Interests: reading    Patient goals for therapy: have complete mobility with my shoulder    Pain assessment: Pain present     Objective   SHOULDER EVALUATION  PAIN: Pain Level at Rest: 3/10  Pain Level with Use: 10/10  Pain is Exacerbated By: lifting arm above head or behind back  Pain is Relieved By: rest  POSTURE: Sitting Posture: Rounded shoulders, Forward head  ROM:   (Degrees) Left AROM Left PROM Right AROM  Right PROM   Shoulder Flexion 120  90 ++  138 with dowel   Shoulder Extension       Shoulder Abduction 112  105++    Shoulder Adduction       Shoulder Internal Rotation T8  PSIS    Shoulder External Rotation 74  45    Shoulder Horizontal Abduction       Shoulder Horizontal Adduction       Shoulder Flexion ER       Shoulder Flexion IR       Elbow Extension       Elbow Flexion       Cervical ROM:   Flex: WNL   Ext: min restriction   Rot R:  Min restriction Rot L: mod restriction- pull into R shoulder   Sidebend R: min restriction Sidebend L: stiff feeling- mod restriction  STRENGTH:   Pain: - none + mild ++ moderate +++ severe  Strength Scale: 0-5/5 Left Right   Shoulder Flexion 5 4-++   Shoulder Extension     Shoulder Abduction 5 4-++   Shoulder Adduction     Shoulder Internal Rotation 5 5   Shoulder External Rotation 5 5   Shoulder Horizontal Abduction     Shoulder Horizontal Adduction     Elbow Flexion 5 4+   Elbow Extension 5 5   Mid Trap     Lower Trap     Rhomboid     Serratus Anterior         SPECIAL TESTS:    Left Right   Impingement     Neer's  Positive   Hawkin's-Hebert  Positive   Coracoid Impingement     Internal impingement     Labral     Anterior Slide     Ansted's  Negative    Crank     Instability     Apprehension (anterior)     Relocation (anterior)     Anterior Load & Shift  Positive   Posterior Load & Shift     Posterior instability (with 90 degrees flex)     Multi-Directional Instability      Sulcus     Biceps      Speed's     Rotator Cuff Tear     Drop Arm     Belly Press  Positive   Lift off   Unable to perform movement     PALPATION:  tender to palpation over supraspinatus tendon and biceps tendon       Assessment & Plan   CLINICAL IMPRESSIONS  Medical Diagnosis: Chronic R shoulder pain    Treatment Diagnosis: R shoulder pain   Impression/Assessment: Patient is a 74 year old female with chronic R shoulder complaints.  The following significant findings have been identified: Pain, Decreased ROM/flexibility, Decreased joint mobility,  and Decreased strength. These impairments interfere with their ability to perform self care tasks, work tasks, recreational activities, and household chores as compared to previous level of function.     Clinical Decision Making (Complexity):  Clinical Presentation: Stable/Uncomplicated  Clinical Presentation Rationale: based on medical and personal factors listed in PT evaluation  Clinical Decision Making (Complexity): Low complexity    PLAN OF CARE  Treatment Interventions:  Interventions: Gait Training, Manual Therapy, Neuromuscular Re-education, Therapeutic Activity, Therapeutic Exercise, Self-Care/Home Management    Long Term Goals     PT Goal 1  Goal Identifier: reaching  Goal Description: Pt will be able to reach overhead without increase in symptoms in order to reach high shelves  Rationale: to maximize safety and independence with performance of ADLs and functional tasks  Target Date: 03/31/25  PT Goal 2  Goal Identifier: lifting  Goal Description: Pt will be able to lift 2# above head without increase in shoulder symptoms  Rationale: to maximize safety and independence with performance of ADLs and functional tasks;to maximize safety and independence within the home  Target Date: 05/12/25      Frequency of Treatment: 1x/week decreasing to every other week  Duration of Treatment: 12 weeks    Education Assessment:   Learner/Method: Patient;Listening;Reading;Demonstration;Pictures/Video;No Barriers to Learning  Education Comments: Educated pt on presenting problem, plan of care, and HEP    Risks and benefits of evaluation/treatment have been explained.   Patient/Family/caregiver agrees with Plan of Care.     Evaluation Time:     PT Eval, Low Complexity Minutes (77999): 25  Evaluation Only     Signing Clinician: Peace Paulino PT        [facility]                                                                                   OUTPATIENT PHYSICAL THERAPY      PLAN OF TREATMENT  FOR OUTPATIENT REHABILITATION   Patient's Last Name, First Name, KIMBERLIEmeterioCODYEemterio  Keyonna De La Garza YOB: 1950   Provider's Name   [facility]   Medical Record No.  [id number]     Onset Date: 01/27/25  Start of Care Date: 02/17/25     Medical Diagnosis:  Chronic R shoulder pain      PT Treatment Diagnosis:  R shoulder pain Plan of Treatment  Frequency/Duration: 1x/week decreasing to every other week/ 12 weeks    Certification date from 02/17/25 to 05/17/25         See note for plan of treatment details and functional goals     Peace Paulino, PT                         I CERTIFY THE NEED FOR THESE SERVICES FURNISHED UNDER        THIS PLAN OF TREATMENT AND WHILE UNDER MY CARE     (Physician attestation of this document indicates review and certification of the therapy plan).              Referring Provider:  Yosef Mccabe    Initial Assessment  See Epic Evaluation- Start of Care Date: 02/17/25

## 2025-02-17 ENCOUNTER — OFFICE VISIT (OUTPATIENT)
Dept: FAMILY MEDICINE | Facility: CLINIC | Age: 75
End: 2025-02-17
Payer: MEDICARE

## 2025-02-17 ENCOUNTER — THERAPY VISIT (OUTPATIENT)
Dept: PHYSICAL THERAPY | Facility: CLINIC | Age: 75
End: 2025-02-17
Attending: INTERNAL MEDICINE
Payer: MEDICARE

## 2025-02-17 VITALS
BODY MASS INDEX: 29.89 KG/M2 | OXYGEN SATURATION: 97 % | RESPIRATION RATE: 18 BRPM | DIASTOLIC BLOOD PRESSURE: 86 MMHG | TEMPERATURE: 97.3 F | SYSTOLIC BLOOD PRESSURE: 136 MMHG | HEART RATE: 72 BPM | WEIGHT: 142.4 LBS | HEIGHT: 58 IN

## 2025-02-17 DIAGNOSIS — M25.511 CHRONIC RIGHT SHOULDER PAIN: ICD-10-CM

## 2025-02-17 DIAGNOSIS — M85.80 OSTEOPENIA, UNSPECIFIED LOCATION: ICD-10-CM

## 2025-02-17 DIAGNOSIS — I10 BENIGN ESSENTIAL HYPERTENSION: Primary | ICD-10-CM

## 2025-02-17 DIAGNOSIS — G89.29 CHRONIC RIGHT SHOULDER PAIN: ICD-10-CM

## 2025-02-17 LAB
CREAT SERPL-MCNC: 0.67 MG/DL (ref 0.51–0.95)
EGFRCR SERPLBLD CKD-EPI 2021: >90 ML/MIN/1.73M2
POTASSIUM SERPL-SCNC: 4.4 MMOL/L (ref 3.4–5.3)

## 2025-02-17 PROCEDURE — G2211 COMPLEX E/M VISIT ADD ON: HCPCS | Performed by: INTERNAL MEDICINE

## 2025-02-17 PROCEDURE — 97161 PT EVAL LOW COMPLEX 20 MIN: CPT | Mod: GP

## 2025-02-17 PROCEDURE — 36415 COLL VENOUS BLD VENIPUNCTURE: CPT | Performed by: INTERNAL MEDICINE

## 2025-02-17 PROCEDURE — 84132 ASSAY OF SERUM POTASSIUM: CPT | Performed by: INTERNAL MEDICINE

## 2025-02-17 PROCEDURE — 97110 THERAPEUTIC EXERCISES: CPT | Mod: GP

## 2025-02-17 PROCEDURE — 82565 ASSAY OF CREATININE: CPT | Performed by: INTERNAL MEDICINE

## 2025-02-17 PROCEDURE — 99213 OFFICE O/P EST LOW 20 MIN: CPT | Performed by: INTERNAL MEDICINE

## 2025-02-17 RX ORDER — AMLODIPINE AND VALSARTAN 5; 320 MG/1; MG/1
1 TABLET ORAL DAILY
Qty: 90 TABLET | Refills: 3 | Status: SHIPPED | OUTPATIENT
Start: 2025-02-17

## 2025-02-17 ASSESSMENT — ACTIVITIES OF DAILY LIVING (ADL)
PUTTING_ON_AN_UNDERSHIRT_OR_A_PULLOVER_SWEATER: 10
WASHING_YOUR_BACK: 10
PUTTING_ON_YOUR_PANTS: 2
REMOVING_SOMETHING_FROM_YOUR_BACK_POCKET: 10
AT_ITS_WORST?: 10
PUSHING_WITH_THE_INVOLVED_ARM: 8
PLACING_AN_OBJECT_ON_A_HIGH_SHELF: 10
REACHING_FOR_SOMETHING_ON_A_HIGH_SHELF: 10
TOUCHING_THE_BACK_OF_YOUR_NECK: 10
WASHING_YOUR_HAIR?: 4
WHEN_LYING_ON_THE_INVOLVED_SIDE: 10
CARRYING_A_HEAVY_OBJECT_OF_10_POUNDS: 10
PLEASE_INDICATE_YOR_PRIMARY_REASON_FOR_REFERRAL_TO_THERAPY:: SHOULDER
PUTTING_ON_A_SHIRT_THAT_BUTTONS_DOWN_THE_FRONT: 8

## 2025-02-17 ASSESSMENT — PAIN SCALES - GENERAL: PAINLEVEL_OUTOF10: MILD PAIN (2)

## 2025-02-17 NOTE — LETTER
February 17, 2025      Keyonna De La Garza  [address] [facility] [address]        Dear ,    We are writing to inform you of your test results.        Resulted Orders   Creatinine   Result Value Ref Range    Creatinine 0.67 0.51 - 0.95 mg/dL    GFR Estimate >90 >60 mL/min/1.73m2      Comment:      eGFR calculated using 2021 CKD-EPI equation.   Potassium   Result Value Ref Range    Potassium 4.4 3.4 - 5.3 mmol/L       If you have any questions or concerns, please call the clinic at the number listed above.       Sincerely,      Yosef Mccabe MD    Electronically signed

## 2025-02-17 NOTE — RESULT ENCOUNTER NOTE
The following letter pertains to your most recent diagnostic tests:    Good news! The labs suggest that you are tolerating the current dose of amlodipine-valsartan well.          Sincerely,    Dr. Mccabe

## 2025-02-17 NOTE — PROGRESS NOTES
Assessment & Plan     Benign essential hypertension  Not optimally controlled  Recommended increasing valsartan from 160 to 320  Return 2 weeks recheck blood pressure and labs  Goal blood pressure < 130/80  Labs today too  If headache fails to resolve or worsens with dose increase, instructed to stop medication and inform us   - amLODIPine-valsartan (EXFORGE) 5-320 MG tablet; Take 1 tablet by mouth daily.  - Creatinine; Future  - Potassium; Future  - Potassium; Future  - Creatinine; Future    Osteopenia, unspecified location  Discussed dexa result and diet and activity recommendations     Chronic right shoulder pain  Improved after injection, plans to start PT             FUTURE APPOINTMENTS:       - Return in about 2 weeks (around 3/3/2025) for MA bp check and lab appt .     Subjective   Keyonna Yoder is a 74 year old, presenting for the following health issues:  Follow Up and Recheck Medication        2/17/2025     8:55 AM   Additional Questions   Roomed by Ronda   Accompanied by Not applicable, by themselves     History of Present Illness       Reason for visit:  Follow up    She eats 4 or more servings of fruits and vegetables daily.She consumes 0 sweetened beverage(s) daily.She exercises with enough effort to increase her heart rate 9 or less minutes per day.  She exercises with enough effort to increase her heart rate 3 or less days per week.   She is taking medications regularly.           Generally tolerating current exforge although described vague mild frontal headache since starting  Has not been severe enough to warrant over the counter analgesics  She wonders if it may get better with some time on the medication?  Shoulder pain better after injection  Starts PT today  Home readings generally remain above goal   Reviewed dexa result         Objective    /86 (BP Location: Left arm, Patient Position: Sitting, Cuff Size: Adult Large)   Pulse 72   Temp 97.3  F (36.3  C) (Temporal)   Resp 18   Ht  "1.47 m (4' 9.87\")   Wt 64.6 kg (142 lb 6.4 oz)   SpO2 97%   BMI 29.90 kg/m    Body mass index is 29.9 kg/m .  Physical Exam   Well appearing, comfortable            Signed Electronically by: Yosef Mccabe MD      The longitudinal plan of care for the diagnosis(es)/condition(s) as documented were addressed during this visit. Due to the added complexity in care, I will continue to support Keyonna Yoder in the subsequent management and with ongoing continuity of care.   "

## 2025-02-25 ENCOUNTER — THERAPY VISIT (OUTPATIENT)
Dept: PHYSICAL THERAPY | Facility: CLINIC | Age: 75
End: 2025-02-25
Payer: MEDICARE

## 2025-02-25 DIAGNOSIS — M25.511 CHRONIC RIGHT SHOULDER PAIN: Primary | ICD-10-CM

## 2025-02-25 DIAGNOSIS — G89.29 CHRONIC RIGHT SHOULDER PAIN: Primary | ICD-10-CM

## 2025-02-25 PROCEDURE — 97110 THERAPEUTIC EXERCISES: CPT | Mod: GP

## 2025-02-25 PROCEDURE — 97140 MANUAL THERAPY 1/> REGIONS: CPT | Mod: GP

## 2025-03-03 ENCOUNTER — LAB (OUTPATIENT)
Dept: LAB | Facility: CLINIC | Age: 75
End: 2025-03-03
Payer: MEDICARE

## 2025-03-03 ENCOUNTER — TELEPHONE (OUTPATIENT)
Dept: FAMILY MEDICINE | Facility: CLINIC | Age: 75
End: 2025-03-03

## 2025-03-03 ENCOUNTER — ALLIED HEALTH/NURSE VISIT (OUTPATIENT)
Dept: FAMILY MEDICINE | Facility: CLINIC | Age: 75
End: 2025-03-03
Payer: MEDICARE

## 2025-03-03 VITALS — DIASTOLIC BLOOD PRESSURE: 67 MMHG | SYSTOLIC BLOOD PRESSURE: 137 MMHG | HEART RATE: 72 BPM

## 2025-03-03 DIAGNOSIS — Z01.30 BP CHECK: Primary | ICD-10-CM

## 2025-03-03 DIAGNOSIS — I10 BENIGN ESSENTIAL HYPERTENSION: ICD-10-CM

## 2025-03-03 LAB
CREAT SERPL-MCNC: 0.65 MG/DL (ref 0.51–0.95)
EGFRCR SERPLBLD CKD-EPI 2021: >90 ML/MIN/1.73M2
POTASSIUM SERPL-SCNC: 4.3 MMOL/L (ref 3.4–5.3)

## 2025-03-03 PROCEDURE — 99207 PR NO CHARGE NURSE ONLY: CPT

## 2025-03-03 PROCEDURE — 3075F SYST BP GE 130 - 139MM HG: CPT

## 2025-03-03 PROCEDURE — 3078F DIAST BP <80 MM HG: CPT

## 2025-03-03 PROCEDURE — 84132 ASSAY OF SERUM POTASSIUM: CPT

## 2025-03-03 PROCEDURE — 82565 ASSAY OF CREATININE: CPT

## 2025-03-03 PROCEDURE — 36415 COLL VENOUS BLD VENIPUNCTURE: CPT

## 2025-03-03 NOTE — LETTER
March 4, 2025      Keyonna De La Garza  [address] [facility] [address]        Dear ,    We are writing to inform you of your test results.    These lab results are normal and the blood pressure looked okay in the clinic.  Ideally, we would like blood pressure less than 130/80 although we were pretty darn close to that goal in clinic today.  I am curious if your home blood pressure readings are closer or at that goal?  If your home blood pressure readings remain above that goal, please inform us.     Resulted Orders   Potassium   Result Value Ref Range    Potassium 4.3 3.4 - 5.3 mmol/L   Creatinine   Result Value Ref Range    Creatinine 0.65 0.51 - 0.95 mg/dL    GFR Estimate >90 >60 mL/min/1.73m2      Comment:      eGFR calculated using 2021 CKD-EPI equation.       If you have any questions or concerns, please call the clinic at the number listed above.       Sincerely,      Yosef Mccabe MD    Electronically signed

## 2025-03-03 NOTE — TELEPHONE ENCOUNTER
Below message relayed to patient. Patient agreeable to plan.     LEONIDES Alarcon  [facility]     
Patient Contact     Attempt # 1     Was call answered?  no.  Left message on voicemail with information to call triage back.     Upon call back, please relay Provider's message below to Patient.    Felicia CAR,  Triage RN  [facility]    
Yosef Mccabe MD at 3/3/2025 10:30 AM    Status: Signed   I think that this blood pressure readings ais good.  Continue current medications.  Please inform her.        
all other ROS negative except as per HPI

## 2025-03-03 NOTE — RESULT ENCOUNTER NOTE
The following letter pertains to your most recent diagnostic tests:    These lab results are normal and the blood pressure looked okay in the clinic.  Ideally, we would like blood pressure less than 130/80 although we were pretty darn close to that goal in clinic today.  I am curious if your home blood pressure readings are closer or at that goal?  If your home blood pressure readings remain above that goal, please inform us.      Sincerely,    Dr. Mccabe

## 2025-03-03 NOTE — PROGRESS NOTES
I think that this blood pressure readings ais good.  Continue current medications.  Please inform her.

## 2025-03-12 ENCOUNTER — THERAPY VISIT (OUTPATIENT)
Dept: PHYSICAL THERAPY | Facility: CLINIC | Age: 75
End: 2025-03-12
Payer: MEDICARE

## 2025-03-12 DIAGNOSIS — M25.511 CHRONIC RIGHT SHOULDER PAIN: Primary | ICD-10-CM

## 2025-03-12 DIAGNOSIS — G89.29 CHRONIC RIGHT SHOULDER PAIN: Primary | ICD-10-CM

## 2025-03-12 PROCEDURE — 97140 MANUAL THERAPY 1/> REGIONS: CPT | Mod: GP

## 2025-03-12 PROCEDURE — 97110 THERAPEUTIC EXERCISES: CPT | Mod: GP

## 2025-03-20 ENCOUNTER — THERAPY VISIT (OUTPATIENT)
Dept: PHYSICAL THERAPY | Facility: CLINIC | Age: 75
End: 2025-03-20
Payer: MEDICARE

## 2025-03-20 DIAGNOSIS — G89.29 CHRONIC RIGHT SHOULDER PAIN: Primary | ICD-10-CM

## 2025-03-20 DIAGNOSIS — M25.511 CHRONIC RIGHT SHOULDER PAIN: Primary | ICD-10-CM

## 2025-04-01 ENCOUNTER — THERAPY VISIT (OUTPATIENT)
Dept: PHYSICAL THERAPY | Facility: CLINIC | Age: 75
End: 2025-04-01
Payer: MEDICARE

## 2025-04-01 DIAGNOSIS — M25.511 CHRONIC RIGHT SHOULDER PAIN: Primary | ICD-10-CM

## 2025-04-01 DIAGNOSIS — G89.29 CHRONIC RIGHT SHOULDER PAIN: Primary | ICD-10-CM

## 2025-04-01 PROCEDURE — 97110 THERAPEUTIC EXERCISES: CPT | Mod: GP

## 2025-04-28 ENCOUNTER — THERAPY VISIT (OUTPATIENT)
Dept: PHYSICAL THERAPY | Facility: CLINIC | Age: 75
End: 2025-04-28
Payer: MEDICARE

## 2025-04-28 DIAGNOSIS — M25.511 CHRONIC RIGHT SHOULDER PAIN: Primary | ICD-10-CM

## 2025-04-28 DIAGNOSIS — G89.29 CHRONIC RIGHT SHOULDER PAIN: Primary | ICD-10-CM

## 2025-04-28 PROCEDURE — 97530 THERAPEUTIC ACTIVITIES: CPT | Mod: GP

## 2025-04-28 PROCEDURE — 97110 THERAPEUTIC EXERCISES: CPT | Mod: GP

## 2025-04-28 NOTE — PROGRESS NOTES
04/28/25 0500   Appointment Info   Signing clinician's name / credentials Kitty Gramajo, PT, DPT, OCS   Total/Authorized Visits E&T   Visits Used 6   Medical Diagnosis Chronic R shoulder pain   PT Tx Diagnosis R shoulder pain   Quick Adds Certification   Progress Note/Certification   Start of Care Date 02/17/25   Onset of illness/injury or Date of Surgery 01/27/25   Therapy Frequency 1x/week decreasing to every other week   Predicted Duration 12 weeks   Certification date from 04/28/25   Certification date to 07/21/25   Progress Note Completed Date 04/28/25   PT Goal 1   Goal Identifier reaching   Goal Description Pt will be able to reach overhead without increase in symptoms in order to reach high shelves   Rationale to maximize safety and independence with performance of ADLs and functional tasks   Target Date 07/07/25   PT Goal 2   Goal Identifier lifting   Goal Description Pt will be able to lift 2# above head without increase in shoulder symptoms   Rationale to maximize safety and independence with performance of ADLs and functional tasks;to maximize safety and independence within the home   Target Date 07/07/25   Subjective Report   Subjective Report Patient has been having more pain and stiffness - stopped doing the wand overhead. All the exercises were good. Counter top stretch does not bother her. She will feel the pain at rest and with reaching - something feels strained. Sleeping is good. Reaching has been more painful.   Objective Measures   Objective Measures Objective Measure 1;Objective Measure 2   Objective Measure 1   Objective Measure AROM   Details AROM: flex 110 ant into biceps, 45 deg ext, 105 deg + into biceps, 60 deg ER, L4 + and stiff   Objective Measure 2   Objective Measure MMT   Treatment Interventions (PT)   Interventions Therapeutic Procedure/Exercise;Therapeutic Activity   Therapeutic Procedure/Exercise   Therapeutic Procedures: strength, endurance, ROM, flexibility minutes (99228)  15   PTRx Ther Proc 1 Pendulum/Codmans   PTRx Ther Proc 1 - Details in clinic R side with demo and VC for set-up   PTRx Ther Proc 2 Side-lying Posterior Capsule Stretch   PTRx Ther Proc 2 - Details 2 x 60 sec on R side with TC for set-up   PTRx Ther Proc 3 Thoracic Extension   PTRx Ther Proc 3 - Details VR   PTRx Ther Proc 4 Standing Passive Shoulder Flexion   PTRx Ther Proc 4 - Details VR   PTRx Ther Proc 5 Scapular Retraction/Depression   PTRx Ther Proc 5 - Details VR   PTRx Ther Proc 6 Shoulder Rolls   PTRx Ther Proc 6 - Details hep   PTRx Ther Proc 7 Shoulder Theraband Rows   PTRx Ther Proc 7 - Details hep   PTRx Ther Proc 8 Shoulder Theraband Internal Rotation   PTRx Ther Proc 8 - Details VR   PTRx Ther Proc 9 Shoulder Theraband External Rotation   PTRx Ther Proc 9 - Details VR   PTRx Ther Proc 10 Shoulder Scaption Full Can - Peds   PTRx Ther Proc 10 - Details 2 x 10 on R side in clinic with demo and VC for set-up   PTRx Ther Proc 11 Friction Massage   PTRx Ther Proc 11 - Details VR   Skilled Intervention see above   Patient Response/Progress patient tolerated well   Therapeutic Activity   Therapeutic Activities: dynamic activities to improve functional performance minutes (41589) 16   Ther Act 1 patient ed   Ther Act 1 - Details patient ed on symptom management, goals of PT interventions, balance between loading and mobility for recovery   Skilled Intervention see above   Patient Response/Progress patient reports understanding   Neuromuscular Re-education   PTRx Neuro Re-ed 1 Median Nerve Mobility   PTRx Neuro Re-ed 1 - Details VR   Education   Learner/Method Patient;Listening;Reading;Demonstration;Pictures/Video;No Barriers to Learning   Plan   Home program PTRx   Updates to plan of care see PN   Plan for next session progress shoulder ROM and rotator cuff strength as tolerated, periscapular strength and activation, manual for RC   Total Session Time   Timed Code Treatment Minutes 31   Total Treatment Time  (sum of timed and untimed services) 31       [facility]                                                                                   OUTPATIENT PHYSICAL THERAPY    PLAN OF TREATMENT FOR OUTPATIENT REHABILITATION   Patient's Last Name, First Name, Keyonna Sewell YOB: 1950   Provider's Name   [facility]   Medical Record No.  [id number]     Onset Date: 01/27/25  Start of Care Date: 02/17/25     Medical Diagnosis:  Chronic R shoulder pain      PT Treatment Diagnosis:  R shoulder pain Plan of Treatment  Frequency/Duration: 1x/week decreasing to every other week/ 12 weeks    Certification date from 04/28/25 to 07/21/25         See note for plan of treatment details and functional goals     Kitty Hyatt, PT                         I CERTIFY THE NEED FOR THESE SERVICES FURNISHED UNDER        THIS PLAN OF TREATMENT AND WHILE UNDER MY CARE     (Physician attestation of this document indicates review and certification of the therapy plan).              Referring Provider:  Yosef Mccabe    Initial Assessment  See Epic Evaluation- Start of Care Date: 02/17/25            PLAN  Continue therapy per current plan of care.    Beginning/End Dates of Progress Note Reporting Period:  2/17/2025 to 04/28/2025    Referring Provider:  Yosef Mccabe

## 2025-05-12 ENCOUNTER — THERAPY VISIT (OUTPATIENT)
Dept: PHYSICAL THERAPY | Facility: CLINIC | Age: 75
End: 2025-05-12
Payer: MEDICARE

## 2025-05-12 DIAGNOSIS — G89.29 CHRONIC RIGHT SHOULDER PAIN: Primary | ICD-10-CM

## 2025-05-12 DIAGNOSIS — M25.511 CHRONIC RIGHT SHOULDER PAIN: Primary | ICD-10-CM

## 2025-05-12 PROCEDURE — 97140 MANUAL THERAPY 1/> REGIONS: CPT | Mod: GP

## 2025-05-12 PROCEDURE — 97110 THERAPEUTIC EXERCISES: CPT | Mod: GP

## 2025-05-22 ENCOUNTER — THERAPY VISIT (OUTPATIENT)
Dept: PHYSICAL THERAPY | Facility: CLINIC | Age: 75
End: 2025-05-22
Payer: MEDICARE

## 2025-05-22 DIAGNOSIS — M25.511 CHRONIC RIGHT SHOULDER PAIN: Primary | ICD-10-CM

## 2025-05-22 DIAGNOSIS — G89.29 CHRONIC RIGHT SHOULDER PAIN: Primary | ICD-10-CM

## 2025-05-29 ENCOUNTER — THERAPY VISIT (OUTPATIENT)
Dept: PHYSICAL THERAPY | Facility: CLINIC | Age: 75
End: 2025-05-29
Payer: MEDICARE

## 2025-05-29 DIAGNOSIS — G89.29 CHRONIC RIGHT SHOULDER PAIN: Primary | ICD-10-CM

## 2025-05-29 DIAGNOSIS — M25.511 CHRONIC RIGHT SHOULDER PAIN: Primary | ICD-10-CM

## 2025-05-29 NOTE — PROGRESS NOTES
05/29/25 0500   Appointment Info   Signing clinician's name / credentials Kitty Gramajo, PT, DPT, OCS   Total/Authorized Visits E&T   Visits Used 9   Medical Diagnosis Chronic R shoulder pain   PT Tx Diagnosis R shoulder pain   Progress Note/Certification   Start of Care Date 02/17/25   Onset of illness/injury or Date of Surgery 01/27/25   Therapy Frequency 1x/week decreasing to every other week   Predicted Duration 12 weeks   Certification date from 04/28/25   Certification date to 07/21/25   Progress Note Completed Date 05/29/25   PT Goal 1   Goal Identifier reaching   Goal Description Pt will be able to reach overhead without increase in symptoms in order to reach high shelves   Rationale to maximize safety and independence with performance of ADLs and functional tasks   Target Date 07/07/25   PT Goal 2   Goal Identifier lifting   Goal Description Pt will be able to lift 2# above head without increase in shoulder symptoms   Rationale to maximize safety and independence with performance of ADLs and functional tasks;to maximize safety and independence within the home   Target Date 07/07/25   Subjective Report   Subjective Report Patient reports that she continues to have some stiffness and pain ant/lateral R shoulder. She tried to lift a heavy pan the  other day that was super painful, but it  calmed down throughout the day. Feels the weather has impacted her symptoms some as well. Frustrated with the stiffness.   Objective Measures   Objective Measures Objective Measure 1;Objective Measure 2   Objective Measure 1   Objective Measure AROM   Details + at 90-95 deg flexion   Objective Measure 2   Objective Measure PROM   Details 90 deg with elbow extended and flexed with pain; hands on counter walk out 120 deg   Treatment Interventions (PT)   Interventions Therapeutic Procedure/Exercise;Therapeutic Activity   Therapeutic Procedure/Exercise   Therapeutic Procedures: strength, endurance, ROM, flexibility minutes  (04803) 18   PTRx Ther Proc 1 Pendulum/Codmans   PTRx Ther Proc 1 - Details in clinic with demo for set-up   PTRx Ther Proc 2 Pulley Shoulder Flexion   PTRx Ther Proc 2 - Details VR   PTRx Ther Proc 3 Pulley Shoulder Abduction   PTRx Ther Proc 3 - Details VR   PTRx Ther Proc 4 Wand Shoulder Scaption Peds   PTRx Ther Proc 4 - Details R side in clinic with demo and TC for set-up   PTRx Ther Proc 5 Side-lying Posterior Capsule Stretch   PTRx Ther Proc 5 - Details hep   PTRx Ther Proc 6 Shoulder Towel Stretch Internal Rotation   PTRx Ther Proc 6 - Details VR   PTRx Ther Proc 7 Thoracic Extension   PTRx Ther Proc 7 - Details hep   PTRx Ther Proc 8 Standing Passive Shoulder Flexion   PTRx Ther Proc 8 - Details in clinic with demo for set-up   PTRx Ther Proc 9 Scapular Retraction/Depression   PTRx Ther Proc 9 - Details hep   PTRx Ther Proc 10 Shoulder Rolls   PTRx Ther Proc 10 - Details hep   PTRx Ther Proc 11 Shoulder Isometric Internal Rotation   PTRx Ther Proc 11 - Details R side in clinic with demo for set-up   PTRx Ther Proc 12 Isometric Shoulder Flexion   PTRx Ther Proc 12 - Details in clinic R side with demo for set-up   PTRx Ther Proc 13 Shoulder Theraband Rows   PTRx Ther Proc 13 - Details hep   PTRx Ther Proc 14 Shoulder Theraband External Rotation   PTRx Ther Proc 14 - Details VR   PTRx Ther Proc 15 Shoulder Theraband Internal Rotation   PTRx Ther Proc 15 - Details VR   PTRx Ther Proc 16 Shoulder Scaption Full Can - Peds   PTRx Ther Proc 16 - Details VR   Skilled Intervention see above   Patient Response/Progress patient tolerated well   Therapeutic Activity   Therapeutic Activities: dynamic activities to improve functional performance minutes (02184) 10   Therapeutic Activities Ther Act 2   Ther Act 1 patient ed   Ther Act 1 - Details VR on symptom management, goals of PT interventions   Ther Act 2 patient ed   Ther Act 2 - Details VR on questions related to stiffness and pain - joint vs muscle pain and symptom  management (focus on movement that feels good)   PTRx Ther Act 1 Ball Massage   PTRx Ther Act 1 - Details VR   PTRx Ther Act 2 Education Sheet General   PTRx Ther Act 2 - Details patient ed in clinic   Skilled Intervention see above   Patient Response/Progress patient reports understanding   Neuromuscular Re-education   PTRx Neuro Re-ed 1 Median Nerve Mobility   PTRx Neuro Re-ed 1 - Details hep   Manual Therapy   Manual Therapy: Mobilization, MFR, MLD, friction massage minutes (29816) 6   Manual Therapy 1 mobilization   Manual Therapy 1 - Details gentle joint oscillations grade 1 for pain relief   Manual Therapy 2 mobilization   Manual Therapy 2 - Details grade 3 with gentle oscillations R shoulder AP   Skilled Intervention to redue pain and improve mobility   Patient Response/Progress patient has more irritability today   Education   Learner/Method Patient;Listening;Reading;Demonstration;Pictures/Video;No Barriers to Learning   Plan   Home program PTRx   Updates to plan of care see PN   Plan for next session progress shoulder ROM and rotator cuff strength as tolerated, periscapular strength and activation, manual for RC   Total Session Time   Timed Code Treatment Minutes 34   Total Treatment Time (sum of timed and untimed services) 34       PLAN  Continue therapy per current plan of care.    Beginning/End Dates of Progress Note Reporting Period:  4/28/2025 to 05/29/2025    Referring Provider:  Yosef Mccabe

## 2025-06-03 ENCOUNTER — OFFICE VISIT (OUTPATIENT)
Dept: FAMILY MEDICINE | Facility: CLINIC | Age: 75
End: 2025-06-03
Payer: MEDICARE

## 2025-06-03 VITALS
RESPIRATION RATE: 16 BRPM | BODY MASS INDEX: 29.05 KG/M2 | HEART RATE: 67 BPM | OXYGEN SATURATION: 98 % | TEMPERATURE: 98 F | DIASTOLIC BLOOD PRESSURE: 77 MMHG | SYSTOLIC BLOOD PRESSURE: 136 MMHG | HEIGHT: 58 IN | WEIGHT: 138.4 LBS

## 2025-06-03 DIAGNOSIS — I10 BENIGN ESSENTIAL HYPERTENSION: Primary | ICD-10-CM

## 2025-06-03 PROCEDURE — 3078F DIAST BP <80 MM HG: CPT | Performed by: INTERNAL MEDICINE

## 2025-06-03 PROCEDURE — 3075F SYST BP GE 130 - 139MM HG: CPT | Performed by: INTERNAL MEDICINE

## 2025-06-03 PROCEDURE — 1125F AMNT PAIN NOTED PAIN PRSNT: CPT | Performed by: INTERNAL MEDICINE

## 2025-06-03 PROCEDURE — 90480 ADMN SARSCOV2 VAC 1/ONLY CMP: CPT | Performed by: INTERNAL MEDICINE

## 2025-06-03 PROCEDURE — 99213 OFFICE O/P EST LOW 20 MIN: CPT | Performed by: INTERNAL MEDICINE

## 2025-06-03 PROCEDURE — 91320 SARSCV2 VAC 30MCG TRS-SUC IM: CPT | Performed by: INTERNAL MEDICINE

## 2025-06-03 RX ORDER — HYDROCHLOROTHIAZIDE 12.5 MG/1
12.5 TABLET ORAL DAILY
Qty: 90 TABLET | Refills: 3 | Status: SHIPPED | OUTPATIENT
Start: 2025-06-03

## 2025-06-03 ASSESSMENT — PAIN SCALES - GENERAL: PAINLEVEL_OUTOF10: MILD PAIN (2)

## 2025-06-03 NOTE — PROGRESS NOTES
"  Assessment & Plan     Benign essential hypertension  Not at goal of that < 130/80  Start hydrochlorothiazide   Side effects and risks discussed  - hydroCHLOROthiazide 12.5 MG tablet; Take 1 tablet (12.5 mg) by mouth daily.  - Sodium; Future  - Potassium; Future  - Creatinine; Future      Follow-up  Return for lab appointment in 2-4 weeks with MA BP check then too.  Patient instructed to return to clinic or contact us sooner if symptoms worsen or new symptoms develop.     Subjective   Keyonna Yoder is a 74 year old, presenting for the following health issues:  Recheck Medication      6/3/2025     1:11 PM   Additional Questions   Roomed by Bina   Accompanied by none     History of Present Illness       Hypertension: She presents for follow up of hypertension.  She does check blood pressure  regularly outside of the clinic. Outside blood pressures have been over 140/90. She follows a low salt diet.     She eats 2-3 servings of fruits and vegetables daily.She consumes 0 sweetened beverage(s) daily.She exercises with enough effort to increase her heart rate 20 to 29 minutes per day.  She exercises with enough effort to increase her heart rate 3 or less days per week.   She is taking medications regularly.            Home blood pressure readings are elevated  No side effects from current medication  Joined a walking club and is watching her diet            Objective    /77 (BP Location: Left arm)   Pulse 67   Temp 98  F (36.7  C) (Tympanic)   Resp 16   Ht 1.461 m (4' 9.5\")   Wt 62.8 kg (138 lb 6.4 oz)   SpO2 98%   BMI 29.43 kg/m    Body mass index is 29.43 kg/m .  Physical Exam   Well appearing             Signed Electronically by: Yosef Mccabe MD    "

## 2025-06-04 ENCOUNTER — OFFICE VISIT (OUTPATIENT)
Dept: ORTHOPEDICS | Facility: CLINIC | Age: 75
End: 2025-06-04
Payer: MEDICARE

## 2025-06-04 DIAGNOSIS — M25.511 CHRONIC RIGHT SHOULDER PAIN: Primary | ICD-10-CM

## 2025-06-04 DIAGNOSIS — G89.29 CHRONIC RIGHT SHOULDER PAIN: Primary | ICD-10-CM

## 2025-06-04 PROCEDURE — 99214 OFFICE O/P EST MOD 30 MIN: CPT | Performed by: FAMILY MEDICINE

## 2025-06-04 NOTE — PROGRESS NOTES
ESTABLISHED PATIENT FOLLOW-UP:  Pain of the Right Shoulder     HISTORY OF PRESENT ILLNESS  Ms. De La Garza is a pleasant 74 year old year old female who presents to clinic today for follow-up of right shoulder pain.     Date of injury/onset: Chronic  Date last seen: 2/6/2025  Following Therapeutic Plan: Physical Therapy  Pain: Similar pain as before  Function: Limited in a couple exercises now  Interval History: Patient had subacromial cortisone injection in February which was very beneficial until recently. She now has had some setbacks in physical therapy now. Lifting and IR are particularly difficult at this time.    Keyonna Yoder has been extraordinarily diligent with her program.  She has completed 9 physical therapy visits.  Per PT, she has been making some slow but steady progress.  Keyonna Yoder feels like she has initially made progress and had excellent relief after the injection, but starting over the last 4 to 6 weeks she feels like she has been gradually worsening.  She is now again unable to reach her bra behind her back with her right arm, unable to fully reach overhead.  This had improved markedly after the injection back in February.    Additional medical/Social/Surgical histories reviewed in [facility] and updated as appropriate.    REVIEW OF SYSTEMS (6/4/2025)  CONSTITUTIONAL: Denies fever and weight loss  GASTROINTESTINAL: Denies abdominal pain, nausea, vomiting  MUSCULOSKELETAL: See HPI  SKIN: Denies any recent rash or lesion  NEUROLOGICAL: Denies numbness or focal weakness     PHYSICAL EXAM  There were no vitals taken for this visit.    General  - normal appearance, in no obvious distress  Musculoskeletal - Right shoulder  - inspection: normal bone and joint alignment, no obvious deformity, no scapular winging, no AC step-off  - palpation: mildly tender RC insertion, normal clavicle, non-tender AC  - ROM:  painful limited FE to 100, Abduction intact, ER 45, IR to lumbosacral region. Stiffness actively and  passively >140 overhead.  - strength: 5/5  strength, 5/5 in all shoulder planes  - special tests:  (-) Speed's  (+) Neer  (+) Hawkin's  (+) Dawna's  (-) [place]'s  (-) apprehension  (-) subscap lift-off  Neuro  - no sensory or motor deficit, grossly normal coordination, normal muscle tone  Skin  - no ecchymosis, erythema, warmth, or induration, no obvious rash  Psych  - interactive, appropriate, normal mood and affect     IMAGING : XR shoulder right 4 views. Final results and radiologist's interpretation, available in the [facility] health record. Images were reviewed with the patient/family members in the office today. My personal interpretation of the performed imaging is no acute osseous abnormality or significant degenerative changes of joint.    Other Tests:    Exam: US MSK ROTATOR CUFF, 12/27/2024 1:36 PM     Indication: Chronic right shoulder pain; Chronic right shoulder pain     Comparison: None     Technique: Real-time sonographic evaluation of the right shoulder.  Still and cine images obtained and archived.     Findings:      Long head of biceps tendon: Tendinosis at the pulley. Tenosynovitis of  the extra-articular component with intrasubstance tearing.     Subscapularis: Low to moderate grade intrasubstance tearing of the  upper fibers at the footprint.     Supraspinatus: Significant tendinosis of the supraspinatus with low to  moderate grade intrasubstance tearing of the anterior middle fibers at  the footprint.     Infraspinatus: Visualized anterior aspect intact. Mid and posterior  fibers obscured.     Acromioclavicular joint: Degenerative changes of the acromioclavicular  joint.     Others: No substantial subacromial or subdeltoid bursal fluid.                                                                      Impression:      1. Supraspinatus tendinosis with low to moderate grade intrasubstance  tearing of the anterior and middle fibers at the footprint.     2. Low to moderate grade intrasubstance  tearing of the upper  subscapularis at the footprint.     3. Tendinosis, tenosynovitis, and intrasubstance tearing of the long  head of the biceps tendon.     AURELIANO GONSALVES MD (Joe)     PT notes reviewed with Kitty Montes 3/12, 3/20, 4/1, 4/28, 5/12, 5/22, 5/29.    ASSESSMENT & PLAN  Ms. De La Garza is a 74 year old year old female who presents to clinic today with chronic right shoulder pain and worsening stiffness of shoulder joint.  Discussed no significant degenerative changes of shoulder on radiographs.      Concern at this time for possible progression of osteoarthritis vs. Adhesive capsulitis superimposed on tendinopathy of right shoulder.  Shoulder pain and stiffness persisting despite extensive physical therapy.  Pain near fully resolved in February after subacromial injection under US guidance.    Diagnosis:  Rotator cuff tendinopathy right shoulder  Biceps tendinopathy right shoulder  Chronic right shoulder pain    Additional diagnostic as well as treatment measures were discussed with Keyonna Yoder.  At this time I recommend an MRI of her right shoulder without contrast to further investigate source of stiffness as she has not made improvements despite 9 visits of physical therapy as well as only temporizing relief with injection.  Based on the ultrasound of her rotator cuff there is no full-thickness rotator cuff tear that would warrant surgery.  As discussed above I do have some concerns about he is a capsulitis and MRI may additionally show findings to support this.  Will also look further into whether there is greater cuff tear than noted on ultrasound, as well as looking for more subtle chondromalacia of the joint.  After MRI is completed I would like her to return to discuss in person or virtually and update treatment program.    It was a pleasure seeing Keyonna.    Ga Montgomery DO, [facility]  Primary Care Sports Medicine

## 2025-06-04 NOTE — LETTER
6/4/2025      Keyonna De La Garza  [address] [facility] [address]      Dear Colleague,    Thank you for referring your patient, Keyonna De La Garza, to the [facility] [place]. Please see a copy of my visit note below.    ESTABLISHED PATIENT FOLLOW-UP:  Pain of the Right Shoulder     HISTORY OF PRESENT ILLNESS  Ms. De La Garza is a pleasant 74 year old year old female who presents to clinic today for follow-up of right shoulder pain.     Date of injury/onset: Chronic  Date last seen: 2/6/2025  Following Therapeutic Plan: Physical Therapy  Pain: Similar pain as before  Function: Limited in a couple exercises now  Interval History: Patient had subacromial cortisone injection in February which was very beneficial until recently. She now has had some setbacks in physical therapy now. Lifting and IR are particularly difficult at this time.    Keyonna Yoder has been extraordinarily diligent with her program.  She has completed 9 physical therapy visits.  Per PT, she has been making some slow but steady progress.  Keyonna Yoder feels like she has initially made progress and had excellent relief after the injection, but starting over the last 4 to 6 weeks she feels like she has been gradually worsening.  She is now again unable to reach her bra behind her back with her right arm, unable to fully reach overhead.  This had improved markedly after the injection back in February.    Additional medical/Social/Surgical histories reviewed in [facility] and updated as appropriate.    REVIEW OF SYSTEMS (6/4/2025)  CONSTITUTIONAL: Denies fever and weight loss  GASTROINTESTINAL: Denies abdominal pain, nausea, vomiting  MUSCULOSKELETAL: See HPI  SKIN: Denies any recent rash or lesion  NEUROLOGICAL: Denies numbness or focal weakness     PHYSICAL EXAM  There were no vitals taken for this visit.    General  - normal appearance, in no obvious distress  Musculoskeletal - Right shoulder  - inspection: normal bone and joint  alignment, no obvious deformity, no scapular winging, no AC step-off  - palpation: mildly tender RC insertion, normal clavicle, non-tender AC  - ROM:  painful limited FE to 100, Abduction intact, ER 45, IR to lumbosacral region. Stiffness actively and passively >140 overhead.  - strength: 5/5  strength, 5/5 in all shoulder planes  - special tests:  (-) Speed's  (+) Neer  (+) Hawkin's  (+) Dawna's  (-) [place]'s  (-) apprehension  (-) subscap lift-off  Neuro  - no sensory or motor deficit, grossly normal coordination, normal muscle tone  Skin  - no ecchymosis, erythema, warmth, or induration, no obvious rash  Psych  - interactive, appropriate, normal mood and affect     IMAGING : XR shoulder right 4 views. Final results and radiologist's interpretation, available in the [facility] health record. Images were reviewed with the patient/family members in the office today. My personal interpretation of the performed imaging is no acute osseous abnormality or significant degenerative changes of joint.    Other Tests:    Exam: US MSK ROTATOR CUFF, 12/27/2024 1:36 PM     Indication: Chronic right shoulder pain; Chronic right shoulder pain     Comparison: None     Technique: Real-time sonographic evaluation of the right shoulder.  Still and cine images obtained and archived.     Findings:      Long head of biceps tendon: Tendinosis at the pulley. Tenosynovitis of  the extra-articular component with intrasubstance tearing.     Subscapularis: Low to moderate grade intrasubstance tearing of the  upper fibers at the footprint.     Supraspinatus: Significant tendinosis of the supraspinatus with low to  moderate grade intrasubstance tearing of the anterior middle fibers at  the footprint.     Infraspinatus: Visualized anterior aspect intact. Mid and posterior  fibers obscured.     Acromioclavicular joint: Degenerative changes of the acromioclavicular  joint.     Others: No substantial subacromial or subdeltoid bursal fluid.                                                                       Impression:      1. Supraspinatus tendinosis with low to moderate grade intrasubstance  tearing of the anterior and middle fibers at the footprint.     2. Low to moderate grade intrasubstance tearing of the upper  subscapularis at the footprint.     3. Tendinosis, tenosynovitis, and intrasubstance tearing of the long  head of the biceps tendon.     AURELIANO GONSALVES MD (Joe)     PT notes reviewed with Kitty Montes 3/12, 3/20, 4/1, 4/28, 5/12, 5/22, 5/29.    ASSESSMENT & PLAN  Ms. De La Garza is a 74 year old year old female who presents to clinic today with chronic right shoulder pain and worsening stiffness of shoulder joint.  Discussed no significant degenerative changes of shoulder on radiographs.      Concern at this time for possible progression of osteoarthritis vs. Adhesive capsulitis superimposed on tendinopathy of right shoulder.  Shoulder pain and stiffness persisting despite extensive physical therapy.  Pain near fully resolved in February after subacromial injection under US guidance.    Diagnosis:  Rotator cuff tendinopathy right shoulder  Biceps tendinopathy right shoulder  Chronic right shoulder pain    Additional diagnostic as well as treatment measures were discussed with Keyonna Yoder.  At this time I recommend an MRI of her right shoulder without contrast to further investigate source of stiffness as she has not made improvements despite 9 visits of physical therapy as well as only temporizing relief with injection.  Based on the ultrasound of her rotator cuff there is no full-thickness rotator cuff tear that would warrant surgery.  As discussed above I do have some concerns about he is a capsulitis and MRI may additionally show findings to support this.  Will also look further into whether there is greater cuff tear than noted on ultrasound, as well as looking for more subtle chondromalacia of the joint.  After MRI is completed I would like  her to return to discuss in person or virtually and update treatment program.    It was a pleasure seeing Keyonna.    Ga Montgomery DO, [facility]  Primary Care Sports Medicine      Again, thank you for allowing me to participate in the care of your patient.        Sincerely,        Ga Montgomery DO    Electronically signed

## 2025-06-06 ENCOUNTER — ANCILLARY PROCEDURE (OUTPATIENT)
Dept: MRI IMAGING | Facility: CLINIC | Age: 75
End: 2025-06-06
Attending: FAMILY MEDICINE
Payer: MEDICARE

## 2025-06-06 DIAGNOSIS — G89.29 CHRONIC RIGHT SHOULDER PAIN: ICD-10-CM

## 2025-06-06 DIAGNOSIS — M25.511 CHRONIC RIGHT SHOULDER PAIN: ICD-10-CM

## 2025-06-06 PROCEDURE — 73221 MRI JOINT UPR EXTREM W/O DYE: CPT | Mod: RT

## 2025-06-06 PROCEDURE — 73221 MRI JOINT UPR EXTREM W/O DYE: CPT | Mod: 26 | Performed by: RADIOLOGY

## 2025-06-09 ENCOUNTER — THERAPY VISIT (OUTPATIENT)
Dept: PHYSICAL THERAPY | Facility: CLINIC | Age: 75
End: 2025-06-09
Payer: MEDICARE

## 2025-06-09 DIAGNOSIS — M25.511 CHRONIC RIGHT SHOULDER PAIN: Primary | ICD-10-CM

## 2025-06-09 DIAGNOSIS — G89.29 CHRONIC RIGHT SHOULDER PAIN: Primary | ICD-10-CM

## 2025-06-09 PROCEDURE — 97110 THERAPEUTIC EXERCISES: CPT | Mod: GP

## 2025-06-09 PROCEDURE — 97530 THERAPEUTIC ACTIVITIES: CPT | Mod: GP

## 2025-06-18 ENCOUNTER — OFFICE VISIT (OUTPATIENT)
Dept: ORTHOPEDICS | Facility: CLINIC | Age: 75
End: 2025-06-18
Payer: MEDICARE

## 2025-06-18 DIAGNOSIS — M75.121 NONTRAUMATIC COMPLETE TEAR OF ROTATOR CUFF, RIGHT: Primary | ICD-10-CM

## 2025-06-18 DIAGNOSIS — G89.29 CHRONIC RIGHT SHOULDER PAIN: ICD-10-CM

## 2025-06-18 DIAGNOSIS — M25.511 CHRONIC RIGHT SHOULDER PAIN: ICD-10-CM

## 2025-06-18 PROCEDURE — 99213 OFFICE O/P EST LOW 20 MIN: CPT | Performed by: FAMILY MEDICINE

## 2025-06-18 SDOH — HEALTH STABILITY: PHYSICAL HEALTH: ON AVERAGE, HOW MANY MINUTES DO YOU ENGAGE IN EXERCISE AT THIS LEVEL?: 50 MIN

## 2025-06-18 SDOH — HEALTH STABILITY: PHYSICAL HEALTH: ON AVERAGE, HOW MANY DAYS PER WEEK DO YOU ENGAGE IN MODERATE TO STRENUOUS EXERCISE (LIKE A BRISK WALK)?: 1 DAY

## 2025-06-18 NOTE — PROGRESS NOTES
ESTABLISHED PATIENT FOLLOW-UP:  Pain of the Right Shoulder       HISTORY OF PRESENT ILLNESS  Ms. De La Garza is a pleasant 74 year old year old female who presents to clinic today for follow-up of right shoulder pain.     Date of injury/onset: Chronic  Date last seen: 6/18/2025  Following Therapeutic Plan: MRI, Physical Therapy  Pain: No changes  Function: No changes  Interval History: Patient returning to discuss MRI results and future care.      Additional medical/Social/Surgical histories reviewed in [facility] and updated as appropriate.    REVIEW OF SYSTEMS (6/18/2025)  CONSTITUTIONAL: Denies fever and weight loss  GASTROINTESTINAL: Denies abdominal pain, nausea, vomiting  MUSCULOSKELETAL: See HPI  SKIN: Denies any recent rash or lesion  NEUROLOGICAL: Denies numbness or focal weakness     PHYSICAL EXAM  There were no vitals taken for this visit.    General  - normal appearance, in no obvious distress  Musculoskeletal - Right shoulder  - inspection: normal bone and joint alignment, no obvious deformity, no scapular winging, no AC step-off  - palpation: mildly tender RC insertion, normal clavicle, non-tender AC  - ROM:  painful limited FE to 100, Abduction intact, ER 45, IR to lumbosacral region. Stiffness actively and passively >140 overhead.  - strength: 5/5  strength, 5/5 in all shoulder planes  - special tests:  (-) Speed's  (+) Neer  (+) Hawkin's  (+) Dawna's  (-) [place]'s  (-) apprehension  (-) subscap lift-off  Neuro  - no sensory or motor deficit, grossly normal coordination, normal muscle tone  Skin  - no ecchymosis, erythema, warmth, or induration, no obvious rash  Psych  - interactive, appropriate, normal mood and affect      IMAGING : XR shoulder right 4 views. Final results and radiologist's interpretation, available in the [facility] health record. Images were reviewed with the patient/family members in the office today. My personal interpretation of the performed imaging is no acute osseous abnormality or  significant degenerative changes of joint.     Other Tests:     Exam: US MSK ROTATOR CUFF, 12/27/2024 1:36 PM     Indication: Chronic right shoulder pain; Chronic right shoulder pain     Comparison: None     Technique: Real-time sonographic evaluation of the right shoulder.  Still and cine images obtained and archived.     Findings:      Long head of biceps tendon: Tendinosis at the pulley. Tenosynovitis of  the extra-articular component with intrasubstance tearing.     Subscapularis: Low to moderate grade intrasubstance tearing of the  upper fibers at the footprint.     Supraspinatus: Significant tendinosis of the supraspinatus with low to  moderate grade intrasubstance tearing of the anterior middle fibers at  the footprint.     Infraspinatus: Visualized anterior aspect intact. Mid and posterior  fibers obscured.     Acromioclavicular joint: Degenerative changes of the acromioclavicular  joint.     Others: No substantial subacromial or subdeltoid bursal fluid.                                                                      Impression:      1. Supraspinatus tendinosis with low to moderate grade intrasubstance  tearing of the anterior and middle fibers at the footprint.     2. Low to moderate grade intrasubstance tearing of the upper  subscapularis at the footprint.     3. Tendinosis, tenosynovitis, and intrasubstance tearing of the long  head of the biceps tendon.     AURELIANO GONSALVES MD (Joe)      PT notes reviewed with Kitty Montes 3/12, 3/20, 4/1, 4/28, 5/12, 5/22, 5/29.    MRI shoulder right without contrast on 6/6/2025 revealing AC joint degenerative changes.  Full-thickness near complete tear of the supraspinatus tendon with retraction noted.     ASSESSMENT & PLAN  Ms. De La Garza is a 74 year old year old female who presents to clinic today with Pain of the Right Shoulder    MRI discussed in detail with Keyonna Yoder and tear has progressed since our ultrasound performed 12/27/2024.  Surgical and  nonsurgical options discussed for large full-thickness rotator cuff tear.    Diagnosis: Nontraumatic  full-thickness tear of right rotator cuff    After discussion of her goals and interest in returning to gym related activities, discussion of her extensive PT course thus far, have recommended shoulder surgical consultation to determine whether she would be a candidate for any type of arthroscopic rotator cuff repair.  She notes that she may be amenable to this, would not be interested in a total shoulder arthroplasty.  I discussed that I do not believe that would be their likely recommendation, but it would certainly be at their discretion.    I would like her to hold off on further physical therapy for now follow-up at the [place] location discussed if she opted against surgery we can consider injections, ongoing therapy, but I cannot be certain of efficacy given lack of significant benefit after injection and therapy to date.    It was a pleasure seeing Reyes Montgomery DO, [facility]  Primary Care Sports Medicine

## 2025-06-18 NOTE — LETTER
6/18/2025      Keyonna De La Garza  [address] [facility] [address]      Dear Colleague,    Thank you for referring your patient, Keyonna De La Garza, to the [facility] [place]. Please see a copy of my visit note below.    ESTABLISHED PATIENT FOLLOW-UP:  Pain of the Right Shoulder       HISTORY OF PRESENT ILLNESS  Ms. De La Garza is a pleasant 74 year old year old female who presents to clinic today for follow-up of right shoulder pain.     Date of injury/onset: Chronic  Date last seen: 6/18/2025  Following Therapeutic Plan: MRI, Physical Therapy  Pain: No changes  Function: No changes  Interval History: Patient returning to discuss MRI results and future care.      Additional medical/Social/Surgical histories reviewed in EPIC and updated as appropriate.    REVIEW OF SYSTEMS (6/18/2025)  CONSTITUTIONAL: Denies fever and weight loss  GASTROINTESTINAL: Denies abdominal pain, nausea, vomiting  MUSCULOSKELETAL: See HPI  SKIN: Denies any recent rash or lesion  NEUROLOGICAL: Denies numbness or focal weakness     PHYSICAL EXAM  There were no vitals taken for this visit.    General  - normal appearance, in no obvious distress  Musculoskeletal - Right shoulder  - inspection: normal bone and joint alignment, no obvious deformity, no scapular winging, no AC step-off  - palpation: mildly tender RC insertion, normal clavicle, non-tender AC  - ROM:  painful limited FE to 100, Abduction intact, ER 45, IR to lumbosacral region. Stiffness actively and passively >140 overhead.  - strength: 5/5  strength, 5/5 in all shoulder planes  - special tests:  (-) Speed's  (+) Neer  (+) Hawkin's  (+) Dawna's  (-) [place]'s  (-) apprehension  (-) subscap lift-off  Neuro  - no sensory or motor deficit, grossly normal coordination, normal muscle tone  Skin  - no ecchymosis, erythema, warmth, or induration, no obvious rash  Psych  - interactive, appropriate, normal mood and affect      IMAGING : XR shoulder right 4  views. Final results and radiologist's interpretation, available in the [facility] health record. Images were reviewed with the patient/family members in the office today. My personal interpretation of the performed imaging is no acute osseous abnormality or significant degenerative changes of joint.     Other Tests:     Exam: US MSK ROTATOR CUFF, 12/27/2024 1:36 PM     Indication: Chronic right shoulder pain; Chronic right shoulder pain     Comparison: None     Technique: Real-time sonographic evaluation of the right shoulder.  Still and cine images obtained and archived.     Findings:      Long head of biceps tendon: Tendinosis at the pulley. Tenosynovitis of  the extra-articular component with intrasubstance tearing.     Subscapularis: Low to moderate grade intrasubstance tearing of the  upper fibers at the footprint.     Supraspinatus: Significant tendinosis of the supraspinatus with low to  moderate grade intrasubstance tearing of the anterior middle fibers at  the footprint.     Infraspinatus: Visualized anterior aspect intact. Mid and posterior  fibers obscured.     Acromioclavicular joint: Degenerative changes of the acromioclavicular  joint.     Others: No substantial subacromial or subdeltoid bursal fluid.                                                                      Impression:      1. Supraspinatus tendinosis with low to moderate grade intrasubstance  tearing of the anterior and middle fibers at the footprint.     2. Low to moderate grade intrasubstance tearing of the upper  subscapularis at the footprint.     3. Tendinosis, tenosynovitis, and intrasubstance tearing of the long  head of the biceps tendon.     AURELIANO GONSALVES MD (Joe)      PT notes reviewed with Kitty Montes 3/12, 3/20, 4/1, 4/28, 5/12, 5/22, 5/29.    MRI shoulder right without contrast on 6/6/2025 revealing AC joint degenerative changes.  Full-thickness near complete tear of the supraspinatus tendon with retraction noted.      ASSESSMENT & PLAN  Ms. De La Garza is a 74 year old year old female who presents to clinic today with Pain of the Right Shoulder    MRI discussed in detail with Keyonna Yoder and tear has progressed since our ultrasound performed 12/27/2024.  Surgical and nonsurgical options discussed for large full-thickness rotator cuff tear.    Diagnosis: Nontraumatic  full-thickness tear of right rotator cuff    After discussion of her goals and interest in returning to gym related activities, discussion of her extensive PT course thus far, have recommended shoulder surgical consultation to determine whether she would be a candidate for any type of arthroscopic rotator cuff repair.  She notes that she may be amenable to this, would not be interested in a total shoulder arthroplasty.  I discussed that I do not believe that would be their likely recommendation, but it would certainly be at their discretion.    I would like her to hold off on further physical therapy for now follow-up at the [place] location discussed if she opted against surgery we can consider injections, ongoing therapy, but I cannot be certain of efficacy given lack of significant benefit after injection and therapy to date.    It was a pleasure seeing KeyonnaEmeterio Montgomery DO, [facility]  Primary Care Sports Medicine      Again, thank you for allowing me to participate in the care of your patient.        Sincerely,        Ga Montgomery DO    Electronically signed

## 2025-06-19 ENCOUNTER — PATIENT OUTREACH (OUTPATIENT)
Dept: CARE COORDINATION | Facility: CLINIC | Age: 75
End: 2025-06-19
Payer: MEDICARE

## 2025-06-23 ENCOUNTER — THERAPY VISIT (OUTPATIENT)
Dept: PHYSICAL THERAPY | Facility: CLINIC | Age: 75
End: 2025-06-23
Payer: MEDICARE

## 2025-06-23 DIAGNOSIS — M25.511 CHRONIC RIGHT SHOULDER PAIN: Primary | ICD-10-CM

## 2025-06-23 DIAGNOSIS — G89.29 CHRONIC RIGHT SHOULDER PAIN: Primary | ICD-10-CM

## 2025-06-23 PROCEDURE — 97530 THERAPEUTIC ACTIVITIES: CPT | Mod: GP

## 2025-06-26 ENCOUNTER — ALLIED HEALTH/NURSE VISIT (OUTPATIENT)
Dept: FAMILY MEDICINE | Facility: CLINIC | Age: 75
End: 2025-06-26
Payer: MEDICARE

## 2025-06-26 ENCOUNTER — LAB (OUTPATIENT)
Dept: LAB | Facility: CLINIC | Age: 75
End: 2025-06-26
Payer: MEDICARE

## 2025-06-26 ENCOUNTER — OFFICE VISIT (OUTPATIENT)
Dept: ORTHOPEDICS | Facility: CLINIC | Age: 75
End: 2025-06-26
Attending: FAMILY MEDICINE
Payer: MEDICARE

## 2025-06-26 VITALS — BODY MASS INDEX: 29.18 KG/M2 | WEIGHT: 139 LBS | HEIGHT: 58 IN

## 2025-06-26 VITALS — DIASTOLIC BLOOD PRESSURE: 72 MMHG | HEART RATE: 72 BPM | SYSTOLIC BLOOD PRESSURE: 122 MMHG | OXYGEN SATURATION: 98 %

## 2025-06-26 DIAGNOSIS — I10 BENIGN ESSENTIAL HYPERTENSION: ICD-10-CM

## 2025-06-26 DIAGNOSIS — Z01.30 BP CHECK: Primary | ICD-10-CM

## 2025-06-26 DIAGNOSIS — G89.29 CHRONIC RIGHT SHOULDER PAIN: ICD-10-CM

## 2025-06-26 DIAGNOSIS — M25.511 CHRONIC RIGHT SHOULDER PAIN: ICD-10-CM

## 2025-06-26 DIAGNOSIS — M75.121 NONTRAUMATIC COMPLETE TEAR OF RIGHT ROTATOR CUFF: Primary | ICD-10-CM

## 2025-06-26 NOTE — PROGRESS NOTES
CC: Right Shoulder Pain    HPI: Patient is a 74-year-old female is here today for evaluation of right shoulder pain.  This has been going on for approximately a year.  She does not recall any inciting events.  She localized the pain over the anterior lateral aspect of her shoulder.  Is painful with overhead activity, lifting object away from her body, and sleep at night.  She takes Tylenol as needed for pain.  She has done 11 sessions of physical therapy.  She had a right subacromial corticosteroid injection performed in February 2025.  This provided 6 weeks of pain relief.  She has not any prior surgery.    She takes medication for hypertension and hypothyroid.  Last hemoglobin A1c is 5.8.  BMI is 29.  She is retired.  She does not smoke.  She enjoys walking for exercise and reading for hobbies         Patient Active Problem List   Diagnosis    Hypothyroidism    CARDIOVASCULAR SCREENING; LDL GOAL LESS THAN 130    GERD (gastroesophageal reflux disease)    Osteopenia    Cholecystitis    Lumbago    Benign essential hypertension    Chronic right shoulder pain          Past Medical History:   Diagnosis Date    Abdominal pain, other specified site 2013    RUQ    Gallstones 2013    GERD (gastroesophageal reflux disease)     Hypothyroidism           Past Surgical History:   Procedure Laterality Date    COLONOSCOPY      LAPAROSCOPIC CHOLECYSTECTOMY  4/24/2013    Procedure: LAPAROSCOPIC CHOLECYSTECTOMY;  Laparoscopic cholecystectomy.;  Surgeon: Bong Pyle MD;  Location:  OR          Current Outpatient Medications   Medication Sig Dispense Refill    ACETAMINOPHEN PO Take 650 mg by mouth as needed for mild pain. Prn 2 tablets at a time      amLODIPine-valsartan (EXFORGE) 5-320 MG tablet Take 1 tablet by mouth daily. 90 tablet 3    diclofenac (VOLTAREN) 1 % topical gel Apply 2 g topically 4 times daily as needed for moderate pain. 100 g 3    Famotidine (PEPCID PO) Every morning      hydroCHLOROthiazide 12.5 MG  tablet Take 1 tablet (12.5 mg) by mouth daily. 90 tablet 3    levothyroxine (SYNTHROID/LEVOTHROID) 100 MCG tablet Take 1 tablet (100 mcg) by mouth daily. 90 tablet 3          Allergies   Allergen Reactions    Duloxetine Diarrhea    External Allergen Needs Reconciliation - See Comment      Please reconcile the Patient's allergy reported as ASPIRINDARVON   and update accordingly    Lasix [Furosemide]           Family History   Problem Relation Age of Onset    C.A.D. Mother     Cerebrovascular Disease Father           Social History     Tobacco Use    Smoking status: Never    Smokeless tobacco: Never   Substance Use Topics    Alcohol use: Yes     Alcohol/week: 0.0 standard drinks of alcohol     Comment: 2 drinks per week            Objective:  Physical Exam:  RUE: No gross deformity of the shoulder.  No open wounds or lacerations.  No surgical incisions.  No erythema or ecchymosis.  No pain to palpation over the clavicle, AC joint, acromion, or scapular spine.  No pain to palpation over the posterior joint line,.  Palpation over the anterolateral aspect of the shoulder.mild pain to palpation over the long head of the biceps in the bicipital groove.  Passive range of motion 140 degrees forward flexion, 130 degrees abduction, 60 degrees external rotation, BL internal rotation.  Active range of motion is equivalent to passive range of motion pain over the lateral aspect of the shoulder through the mid arc of motion.  4/5 strength in flexion and abduction limited by pain. 5/5 strength in internal and external rotation.  Negative cross body for AC joint pain.  Positive O'Uriel's for pain over the deep anterior shoulder.  Negative speeds for pain over the bicipital groove.  Positive Jobes for pain.    Sensation intact to axillary, radial, median, and ulnar nerve distribution.    Imaging:  3 view x-ray of the right shoulder from January 27, 2025 as well as Grashey and axillary view from today were reviewed.  This shows no  fracture or acute bony pathology.  Well-maintained glenohumeral alignment.  No high riding humeral head.  Mild AC joint arthrosis    MRI without contrast of the right shoulder from June 6, 2025 shows full-thickness tearing of the leading edge of the supraspinatus with 16 mm of retraction.  There is also leading edge tear and subscapularis with medial biceps subluxation and tendinosis.  Not appreciate fatty atrophy of the rotator cuff muscle bellies.  No significant chondromalacia of the glenohumeral joint.  Mild AC joint arthrosis.    Assessment and Plan: Patient is a 74-year-old female seen here today for evaluation of right shoulder pain.  X-ray and MRI show full-thickness tearing of the leading half of the supraspinatus with mild retraction as well as medial subluxation of the long of the biceps tendon with tendinosis. I had the opportunity review the imaging with the patient today.  We discussed treatment options.  Discussed nonoperative management the form of oral anti-inflammatory medication, activity modification, the role of physical therapy, and corticosteroid injection.  We discussed operative intervention would be in the form of right shoulder arthroscopic rotator cuff repair and biceps tenotomy versus open subpectoralis biceps tenodesis.  I described the operative technique of a rotator cuff repair.  Given her age and appearance of tendon quality on MRI, I recommended potentially augmenting her repair with acellular dermal allograft.  I described the operative technique and its role in helping healing rates and patients at high risk of repair failure I described the technique differences of a biceps tenotomy versus open subpectoralis biceps tenodesis.  I discussed the advantage of a tenodesis in decreasing the risk of a Phan deformity, but that there would be no difference in elbow and shoulder strength at 1 year between the two procedures.  I outlined the postoperative protocol.  We discussed the risk  and benefits of operative intervention including but not limited to bleeding, infection, failure to cure pain, stiffness requiring manipulation, posttraumatic arthritis, reinjury to the rotator cuff tendons, loss of limb and loss of life.  I discussed that the risk of tendon not healing or reinjury to the tendons in the first 5 years is approximately 10-15%.  I had the opportunity to answer any questions.      At this time point she feels she has trialed and exhausted nonoperative management.  She would like to move forward with operative intervention in the form of right shoulder arthroscopic rotator cuff repair with possible acellular dermal allograft augmentation and possible biceps tenotomy.  I think this is a very reasonable option for her.  Will get this scheduled for her.      Yosef Mendoza MD    [facility]   Department of Orthopedic Surgery    Disclaimer: This note consists of symbols derived from keyboarding, dictation and/or voice recognition software. As a result, there may be errors in the script that have gone undetected. Please consider this when interpreting information found in this chart.

## 2025-06-26 NOTE — NURSING NOTE
Teaching Flowsheet     Visit Type: In Clinic    Time Start: 150  Time End: 217  Total Time Spent: 27 min.    Surgeon: Dr. Mendoza  Location of Surgery (known or anticipated): unknown  Type of Anesthesia: Choice with Block  Worker's Compensation Procedure: No    Pertinent Medical History: hypertension, hypothyroidism  Were medical conditions reviewed and appropriate for location? Yes  BMI: Overweight BMI 25-29.9    Relevant Diagnosis: Nontraumatic complete tear of right rotator cuff   Teaching Topic: Right shoulder arthroscopy, arthroscopic rotator cuff repair with Cuffmend augmentation and biceps tenotomy.     Person(s) involved in teaching:   Patient  : No.   Verified Patient's Phone Number: YES: [phone]    Caregiver//  Name: Undecided  Phone Number:    Relationship:   Consent to Communicate on file: Yes  Authorization to Share Protected Health Information- Person to person communication signed by patient and authorized the following person or people:  Domenico De La Garza, brother, [phone]     Motivation Level:  Asks Questions: Yes  Eager to Learn: Yes  Cooperative: Yes  Receptive (willing/able to accept information): Yes  Any cultural factors/Episcopal beliefs that may influence understanding or compliance? No     Patient demonstrates understanding of the following:  Reason for the appointment, diagnosis and treatment plan: Yes  Knowledge of proper use of medications and conditions for which they are ordered (with special attention to potential side effects or drug interactions): Yes  Which situations necessitate calling provider and whom to contact: Yes     Teaching Concerns Addressed:   Proper use and care of medical equip, care aids, etc.: Yes  Nutritional needs and diet plan: Yes  Pain management techniques: Yes  Wound Care: Yes  How and/when to access community resources: Yes  Need for pre-op with in 30 days: YES, will be done with PCP. I asked them to ensure they go over their daily  medications during this visit and discuss what medications need to be stopped before surgery and when. If you are doing a pre-op with your PCP and they are not within the [facility], I ask them to fax it to our pre-op office. Patient verbalized understanding.       Does patient have difficulty getting a ride to appointments (post-ops, PT/OT): No  Patient's plan after discharge: home with family or spouse     Instructional Materials Used/Given:  two bottles of chlorhexidine soap and a surgery packet given to patient in clinic.   - Important Contact Info/Phone Numbers: [facility] clinic number [phone]  - Map/location of surgery and follow-up appointments  - Showering instructions  - Stoplight Tool     -Next step: schedule a surgery date and schedule a pre-op with PCP.    Alivia Jones RN

## 2025-06-26 NOTE — Clinical Note
Initial BP:  /72 (BP Location: Left arm, Patient Position: Sitting, Cuff Size: Adult Large)   Pulse 72   SpO2 98%     72

## 2025-06-26 NOTE — LETTER
6/26/2025      Keyonna De La Garza  [address] [facility] [address]      Dear Colleague,    Thank you for referring your patient, Keyonna De La Garza, to the [facility] [place]. Please see a copy of my visit note below.    CC: Right Shoulder Pain    HPI: Patient is a 74-year-old female is here today for evaluation of right shoulder pain.  This has been going on for approximately a year.  She does not recall any inciting events.  She localized the pain over the anterior lateral aspect of her shoulder.  Is painful with overhead activity, lifting object away from her body, and sleep at night.  She takes Tylenol as needed for pain.  She has done 11 sessions of physical therapy.  She had a right subacromial corticosteroid injection performed in February 2025.  This provided 6 weeks of pain relief.  She has not any prior surgery.    She takes medication for hypertension and hypothyroid.  Last hemoglobin A1c is 5.8.  BMI is 29.  She is retired.  She does not smoke.  She enjoys walking for exercise and reading for hobbies         Patient Active Problem List   Diagnosis     Hypothyroidism     CARDIOVASCULAR SCREENING; LDL GOAL LESS THAN 130     GERD (gastroesophageal reflux disease)     Osteopenia     Cholecystitis     Lumbago     Benign essential hypertension     Chronic right shoulder pain          Past Medical History:   Diagnosis Date     Abdominal pain, other specified site 2013    RUQ     Gallstones 2013     GERD (gastroesophageal reflux disease)      Hypothyroidism           Past Surgical History:   Procedure Laterality Date     COLONOSCOPY       LAPAROSCOPIC CHOLECYSTECTOMY  4/24/2013    Procedure: LAPAROSCOPIC CHOLECYSTECTOMY;  Laparoscopic cholecystectomy.;  Surgeon: Bong Pyle MD;  Location: [facility] OR          Current Outpatient Medications   Medication Sig Dispense Refill     ACETAMINOPHEN PO Take 650 mg by mouth as needed for mild pain. Prn 2 tablets at a time        amLODIPine-valsartan (EXFORGE) 5-320 MG tablet Take 1 tablet by mouth daily. 90 tablet 3     diclofenac (VOLTAREN) 1 % topical gel Apply 2 g topically 4 times daily as needed for moderate pain. 100 g 3     Famotidine (PEPCID PO) Every morning       hydroCHLOROthiazide 12.5 MG tablet Take 1 tablet (12.5 mg) by mouth daily. 90 tablet 3     levothyroxine (SYNTHROID/LEVOTHROID) 100 MCG tablet Take 1 tablet (100 mcg) by mouth daily. 90 tablet 3          Allergies   Allergen Reactions     Duloxetine Diarrhea     External Allergen Needs Reconciliation - See Comment      Please reconcile the Patient's allergy reported as ASPIRINDARVON   and update accordingly     Lasix [Furosemide]           Family History   Problem Relation Age of Onset     C.A.D. Mother      Cerebrovascular Disease Father           Social History     Tobacco Use     Smoking status: Never     Smokeless tobacco: Never   Substance Use Topics     Alcohol use: Yes     Alcohol/week: 0.0 standard drinks of alcohol     Comment: 2 drinks per week            Objective:  Physical Exam:  RUE: No gross deformity of the shoulder.  No open wounds or lacerations.  No surgical incisions.  No erythema or ecchymosis.  No pain to palpation over the clavicle, AC joint, acromion, or scapular spine.  No pain to palpation over the posterior joint line,.  Palpation over the anterolateral aspect of the shoulder.mild pain to palpation over the long head of the biceps in the bicipital groove.  Passive range of motion 140 degrees forward flexion, 130 degrees abduction, 60 degrees external rotation, BL internal rotation.  Active range of motion is equivalent to passive range of motion pain over the lateral aspect of the shoulder through the mid arc of motion.  4/5 strength in flexion and abduction limited by pain. 5/5 strength in internal and external rotation.  Negative cross body for AC joint pain.  Positive O'Uriel's for pain over the deep anterior shoulder.  Negative speeds for  pain over the bicipital groove.  Positive Jobes for pain.    Sensation intact to axillary, radial, median, and ulnar nerve distribution.    Imaging:  3 view x-ray of the right shoulder from January 27, 2025 as well as Grashey and axillary view from today were reviewed.  This shows no fracture or acute bony pathology.  Well-maintained glenohumeral alignment.  No high riding humeral head.  Mild AC joint arthrosis    MRI without contrast of the right shoulder from June 6, 2025 shows full-thickness tearing of the leading edge of the supraspinatus with 16 mm of retraction.  There is also leading edge tear and subscapularis with medial biceps subluxation and tendinosis.  Not appreciate fatty atrophy of the rotator cuff muscle bellies.  No significant chondromalacia of the glenohumeral joint.  Mild AC joint arthrosis.    Assessment and Plan: Patient is a 74-year-old female seen here today for evaluation of right shoulder pain.  X-ray and MRI show full-thickness tearing of the leading half of the supraspinatus with mild retraction as well as medial subluxation of the long of the biceps tendon with tendinosis. I had the opportunity review the imaging with the patient today.  We discussed treatment options.  Discussed nonoperative management the form of oral anti-inflammatory medication, activity modification, the role of physical therapy, and corticosteroid injection.  We discussed operative intervention would be in the form of right shoulder arthroscopic rotator cuff repair and biceps tenotomy versus open subpectoralis biceps tenodesis.  I described the operative technique of a rotator cuff repair.  Given her age and appearance of tendon quality on MRI, I recommended potentially augmenting her repair with acellular dermal allograft.  I described the operative technique and its role in helping healing rates and patients at high risk of repair failure I described the technique differences of a biceps tenotomy versus open  subpectoralis biceps tenodesis.  I discussed the advantage of a tenodesis in decreasing the risk of a Phan deformity, but that there would be no difference in elbow and shoulder strength at 1 year between the two procedures.  I outlined the postoperative protocol.  We discussed the risk and benefits of operative intervention including but not limited to bleeding, infection, failure to cure pain, stiffness requiring manipulation, posttraumatic arthritis, reinjury to the rotator cuff tendons, loss of limb and loss of life.  I discussed that the risk of tendon not healing or reinjury to the tendons in the first 5 years is approximately 10-15%.  I had the opportunity to answer any questions.      At this time point she feels she has trialed and exhausted nonoperative management.  She would like to move forward with operative intervention in the form of right shoulder arthroscopic rotator cuff repair with possible acellular dermal allograft augmentation and possible biceps tenotomy.  I think this is a very reasonable option for her.  Will get this scheduled for her.      Yosef Mendoza MD    [facility]   Department of Orthopedic Surgery    Disclaimer: This note consists of symbols derived from keyboarding, dictation and/or voice recognition software. As a result, there may be errors in the script that have gone undetected. Please consider this when interpreting information found in this chart.       Again, thank you for allowing me to participate in the care of your patient.        Sincerely,        Yosef Mendoza MD    Electronically signed

## 2025-06-26 NOTE — PATIENT INSTRUCTIONS
[facility]   [address], [address]  [place] [address] [address] [place] [address]   Appointments: [phone] Appointments: [phone]   Fax: [phone] Fax: [phone]       1. Chronic right shoulder pain        SURGERY:  Schedule  surgery.   The Surgery Scheduler will contact you to assist with scheduling surgery.   You can contact her directly at [phone].     A pre-operative Physical with your primary care physician is required within 30 days of your scheduled proceedure  Physical Therapy will be scheduled         FORMS:   If you are needing any forms completed relating to your upcoming procedure, please send them to our office with a completed Release of Information.   Forms will be completed AFTER your procedure. A letter can be sent to your employer prior to surgery to inform them of your anticipated time off.    Please notify our staff if you would like a letter to do so.   Forms can be faxed directly to our clinic at [phone].     DO NOT BRING FORMS ON THE DATE OF SURGERY.     MEDICATION REFILL:   Please allow 3 business days for completion of medication refills for any surgery related prescription.   Medication refills submitted on Friday, may not be addressed until the following Monday.  You may request a refill via [employer], or by calling our Nurse Triage at [phone].      Call my office with any questions or concerns, [phone].

## 2025-06-27 ENCOUNTER — RESULTS FOLLOW-UP (OUTPATIENT)
Dept: FAMILY MEDICINE | Facility: CLINIC | Age: 75
End: 2025-06-27

## 2025-07-07 ENCOUNTER — THERAPY VISIT (OUTPATIENT)
Dept: PHYSICAL THERAPY | Facility: CLINIC | Age: 75
End: 2025-07-07
Payer: MEDICARE

## 2025-07-07 DIAGNOSIS — M25.511 CHRONIC RIGHT SHOULDER PAIN: Primary | ICD-10-CM

## 2025-07-07 DIAGNOSIS — G89.29 CHRONIC RIGHT SHOULDER PAIN: Primary | ICD-10-CM

## 2025-07-07 PROCEDURE — 97530 THERAPEUTIC ACTIVITIES: CPT | Mod: GP

## 2025-07-07 PROCEDURE — 97110 THERAPEUTIC EXERCISES: CPT | Mod: GP

## 2025-07-07 NOTE — PROGRESS NOTES
Pt returns for 12th and final PT session for right shoulder pain. Pt reports that her shoulder continues to be painful and stiff. Pt continues to demonstrate decreased AROM and PROM with pain. PT focused on answering pt questions regarding surgery and providing edu on why recovery time is as long as it is and what to expect after surgery. Edu to continue to perform HEP exercises up until surgery but then hold off on all exercises until given the OK to perform pendulums by surgeon and/or until pt comes to first PT evaluation post-op. Pt encouraged to reach out to PT or surgeon before surgery with any questions or concerns. Pt does not report any concerns or other questions at this time. Pt is to discharge from PT at this time, but will continue to benefit from skilled PT after RC surgery to address deficits, initiate post-op protocol and improve overall function.      07/07/25 0500   Appointment Info   Signing clinician's name / credentials Lilia Humphrey, PT, DPT   Total/Authorized Visits E&T   Visits Used 12   Medical Diagnosis Chronic R shoulder pain   PT Tx Diagnosis R shoulder pain   Progress Note/Certification   Start of Care Date 02/17/25   Onset of illness/injury or Date of Surgery 01/27/25   Therapy Frequency 1x/week decreasing to every other week   Predicted Duration 12 weeks   Certification date from 04/28/25   Certification date to 07/21/25   Progress Note Completed Date 05/29/25   PT Goal 1   Goal Identifier reaching   Goal Description Pt will be able to reach overhead without increase in symptoms in order to reach high shelves   Rationale to maximize safety and independence with performance of ADLs and functional tasks   Target Date 07/07/25   PT Goal 2   Goal Identifier lifting   Goal Description Pt will be able to lift 2# above head without increase in shoulder symptoms   Rationale to maximize safety and independence with performance of ADLs and functional tasks;to maximize safety and independence  within the home   Target Date 07/07/25   Subjective Report   Subjective Report Pt reports that she went to see Dr. Montgomery last week. Pt was then referred for a surgical consult, which she has this Thursday. Pt reports that function is still very limited.   Objective Measures   Objective Measures Objective Measure 1;Objective Measure 2   Objective Measure 1   Objective Measure AROM   Details Flexion 105, scaption: 105, ER: 55, IR: sacrum   Objective Measure 2   Objective Measure PROM   Details 120 degrees flexion and scaption   Treatment Interventions (PT)   Interventions Therapeutic Procedure/Exercise;Therapeutic Activity   Therapeutic Procedure/Exercise   Therapeutic Procedures: strength, endurance, ROM, flexibility minutes (95289) 10   PTRx Ther Proc 1 Pendulum/Codmans   PTRx Ther Proc 1 - Details verbal review to use for pain modulation as needed   PTRx Ther Proc 2 Thoracic Extension   PTRx Ther Proc 2 - Details verbal review   PTRx Ther Proc 3 Shoulder Rolls   PTRx Ther Proc 3 - Details verbal review to use for pain modulation as needed   PTRx Ther Proc 4 Scapular Retraction/Depression   PTRx Ther Proc 4 - Details verbal review   PTRx Ther Proc 5 Standing Passive Shoulder Flexion   PTRx Ther Proc 5 - Details verbal review   PTRx Ther Proc 6 Wand Shoulder Scaption Peds   PTRx Ther Proc 6 - Details verbal review   PTRx Ther Proc 7 Wand Shoulder Internal Rotation   PTRx Ther Proc 7 - Details verbal review   PTRx Ther Proc 8 Wand Shoulder External Rotation in Standing   PTRx Ther Proc 8 - Details verbal review   PTRx Ther Proc 9 Side-lying Posterior Capsule Stretch   PTRx Ther Proc 9 - Details taken from HEP at this time to decrease discomfort   PTRx Ther Proc 10 Shoulder Isometric Internal Rotation   PTRx Ther Proc 10 - Details verbal and visual review   PTRx Ther Proc 11 Isometric Shoulder Flexion   PTRx Ther Proc 11 - Details verbal review   PTRx Ther Proc 12 Shoulder Theraband Rows   PTRx Ther Proc 12 - Details  verbal review   PTRx Ther Proc 13 Shoulder Theraband External Rotation   PTRx Ther Proc 13 - Details verbal review   PTRx Ther Proc 14 Standing wall slides   PTRx Ther Proc 14 - Details 1x5 into flexion, trialed into scaption but increase in pain - pt to just perform forward flexion at home at this time   Skilled Intervention Review and modification of HEP for improved pt tolerance and ability to perform exercises - see above   Patient Response/Progress Pt tolerated well today - demonstrated good understanding of exercises   Therapeutic Activity   Therapeutic Activities: dynamic activities to improve functional performance minutes (57794) 20   Therapeutic Activities Ther Act 2   Ther Act 1 Continued education on pt's upcoming surgery within PT scope; edu on what pt will be able to do/won't be able to do, especially if biceps tenotomy/tenodesis is performed; edu on why recovery is so long; edu provided on what exercises pt may be doing the first week but to hold off on all exercises after surgery unless given the OK by surgeon or until pt comes to initial evaluation after surgery; edu to not push into pain but continue to move the shoulder as tolerated until surgery; discussed scheduling PT visits for post-op (2x/week for 4 weeks and then 1x/week after that for 4 weeks to get started); answered all pt questions   Skilled Intervention see above   Patient Response/Progress patient reports understanding   Neuromuscular Re-education   PTRx Neuro Re-ed 1 Median Nerve Mobility   PTRx Neuro Re-ed 1 - Details hep   Manual Therapy   Manual Therapy: Mobilization, MFR, MLD, friction massage minutes (69444) 6   Manual Therapy 1 Jt mobs   Manual Therapy 1 - Details Grades I-II GH joint for pain relief, 1-1.5 min   Skilled Intervention Jt mobs for pain modulation   Patient Response/Progress Pt tolerated well - decreased pain afterwards   Education   Learner/Method Patient;Listening;Reading;Demonstration;Pictures/Video;No Barriers  to Learning   Plan   Home program PTRx   Total Session Time   Timed Code Treatment Minutes 36   Total Treatment Time (sum of timed and untimed services) 36       DISCHARGE  Reason for Discharge: No further expectation of progress.    Equipment Issued: none    Discharge Plan: Patient to continue home program before surgery on 8/8/25    Referring Provider:  Yosef Mccabe

## 2025-07-24 ENCOUNTER — OFFICE VISIT (OUTPATIENT)
Dept: FAMILY MEDICINE | Facility: CLINIC | Age: 75
End: 2025-07-24
Payer: MEDICARE

## 2025-07-24 VITALS
HEIGHT: 58 IN | WEIGHT: 139.9 LBS | RESPIRATION RATE: 20 BRPM | DIASTOLIC BLOOD PRESSURE: 76 MMHG | SYSTOLIC BLOOD PRESSURE: 126 MMHG | TEMPERATURE: 97.7 F | BODY MASS INDEX: 29.37 KG/M2 | HEART RATE: 73 BPM | OXYGEN SATURATION: 98 %

## 2025-07-24 DIAGNOSIS — M75.121 NONTRAUMATIC COMPLETE TEAR OF RIGHT ROTATOR CUFF: ICD-10-CM

## 2025-07-24 DIAGNOSIS — E03.9 HYPOTHYROIDISM, UNSPECIFIED TYPE: ICD-10-CM

## 2025-07-24 DIAGNOSIS — Z01.818 PREOP GENERAL PHYSICAL EXAM: Primary | ICD-10-CM

## 2025-07-24 DIAGNOSIS — Z13.1 SCREENING FOR DIABETES MELLITUS: ICD-10-CM

## 2025-07-24 DIAGNOSIS — I10 BENIGN ESSENTIAL HYPERTENSION: ICD-10-CM

## 2025-07-24 DIAGNOSIS — K21.9 GASTROESOPHAGEAL REFLUX DISEASE, UNSPECIFIED WHETHER ESOPHAGITIS PRESENT: ICD-10-CM

## 2025-07-24 PROBLEM — M54.50 LUMBAGO: Status: RESOLVED | Noted: 2017-08-11 | Resolved: 2025-07-24

## 2025-07-24 PROBLEM — G89.29 CHRONIC RIGHT SHOULDER PAIN: Status: RESOLVED | Noted: 2025-02-17 | Resolved: 2025-07-24

## 2025-07-24 PROBLEM — M25.511 CHRONIC RIGHT SHOULDER PAIN: Status: RESOLVED | Noted: 2025-02-17 | Resolved: 2025-07-24

## 2025-07-24 LAB
CREAT SERPL-MCNC: 0.72 MG/DL (ref 0.51–0.95)
EGFRCR SERPLBLD CKD-EPI 2021: 88 ML/MIN/1.73M2
EST. AVERAGE GLUCOSE BLD GHB EST-MCNC: 120 MG/DL
HBA1C MFR BLD: 5.8 % (ref 0–5.6)
HGB BLD-MCNC: 12.4 G/DL (ref 11.7–15.7)
MCV RBC AUTO: 88 FL (ref 78–100)
POTASSIUM SERPL-SCNC: 4.1 MMOL/L (ref 3.4–5.3)
SODIUM SERPL-SCNC: 140 MMOL/L (ref 135–145)

## 2025-07-24 ASSESSMENT — PAIN SCALES - GENERAL: PAINLEVEL_OUTOF10: MILD PAIN (2)

## 2025-07-24 NOTE — PATIENT INSTRUCTIONS
How to Take Your Medication Before Surgery  Preoperative Medication Instructions   Antiplatelet or Anticoagulation Medication Instructions   - We reviewed the medication list and the patient is not on an antiplatelet or anticoagulation medications.    Additional Medication Instructions  Take all scheduled medications on the day of surgery EXCEPT for modifications listed below:   - ACE/ARB/ARNI (lisinopril, enalapril, losartan, valsartan, olmesartan, sacubritril/valsartan) : Continue without modification (e.g., MAC anesthesia, neurosurgery, spine surgery, heart failure, or labile hypertension with risk of hypertension).   - Calcium Channel Blockers (amlodipine, diltiazem, felodipine): May be continued on the day of surgery.   - Diuretics (furosemide, hydrochlorothiazide, chlorothalidone): DO NOT TAKE on the day of surgery.       On the AM of surgery, skip hydrochlorothiazide.  You may take all of your other scheduled medications with a sip of water on the AM of surgery prior to departing for surgery center.        Patient Education   Preparing for Your Surgery  For Adults  Getting started  In most cases, a nurse will call to review your health history and instructions. They will give you an arrival time based on your scheduled surgery time. Please be ready to share:  Your doctor's clinic name and phone number  Your medical, surgical, and anesthesia history  A list of allergies and sensitivities  A list of medicines, including herbal treatments and over-the-counter drugs  Whether the patient has a legal guardian (ask how to send us the papers in advance)  Note: You may not receive a call if you were seen at our PAC (Preoperative Assessment Center).  Please tell us if you're pregnant--or if there's any chance you might be pregnant. Some surgeries may injure a fetus (unborn baby), so they require a pregnancy test. Surgeries that are safe for a fetus don't always need a test, and you can choose whether to have one.    Preparing for surgery  Within 10 to 30 days of surgery: Have a pre-op exam (sometimes called an H&P, or History and Physical). This can be done at a clinic or pre-operative center.  If you're having a , you may not need this exam. Talk to your care team.  At your pre-op exam, talk to your care team about all medicines you take. (This includes CBD oil and any drugs, such as THC, marijuana, and other forms of cannabis.) If you need to stop any medicine before surgery, ask when to start taking it again.  This is for your safety. Many medicines and drugs can make you bleed too much during surgery. Some change how well surgery (anesthesia) drugs work.  Call your insurance company to let them know you're having surgery. (If you don't have insurance, call [phone].)  Call your clinic if there's any change in your health. This includes a scrape or scratch near the surgery site, or any signs of a cold (sore throat, runny nose, cough, rash, fever).  Eating and drinking guidelines  For your safety: Unless your surgeon tells you otherwise, follow the guidelines below.  Eat and drink as normal until 8 hours before you arrive for surgery. After that, no food or milk. You can spit out gum when you arrive.  Drink clear liquids until 2 hours before you arrive. These are liquids you can see through, like water, Gatorade, and Propel Water. They also include plain black coffee and tea (no cream or milk).  No alcohol for 24 hours before you arrive. The night before surgery, stop any drinks that contain THC.  If your care team tells you to take medicine on the morning of surgery, it's okay to take it with a sip of water. No other medicines or drugs are allowed (including CBD oil)--follow your care team's instructions.  If you have questions the day of surgery, call your hospital or surgery center.   Preventing infection  Shower or bathe the night before and the morning of surgery. Follow the instructions your clinic gave  you. (If no instructions, use regular soap.)  Don't shave or clip hair near your surgery site. We'll remove the hair if needed.  Don't smoke or vape the morning of surgery. No chewing tobacco for 6 hours before you arrive. A nicotine patch is okay. You may spit out nicotine gum when you arrive.  For some surgeries, the surgeon will tell you to fully quit smoking and nicotine.  We will make every effort to keep you safe from infection. We will:  Clean our hands often with soap and water (or an alcohol-based hand rub).  Clean the skin at your surgery site with a special soap that kills germs.  Give you a special gown to keep you warm. (Cold raises the risk of infection.)  Wear hair covers, masks, gowns, and gloves during surgery.  Give antibiotic medicine, if prescribed. Not all surgeries need this medicine.  What to bring on the day of surgery  Photo ID and insurance card  Copy of your health care directive, if you have one  Glasses and hearing aids (bring cases)  You can't wear contacts during surgery  Inhaler and eye drops, if you use them (tell us about these when you arrive)  CPAP machine or breathing device, if you use them  A few personal items, if spending the night  If you have . . .  A pacemaker, ICD (cardiac defibrillator), or other implant: Bring the ID card.  An implanted stimulator: Bring the remote control.  A legal guardian: Bring a copy of the certified (court-stamped) guardianship papers.  Please remove any jewelry, including body piercings. Leave jewelry and other valuables at home.  If you're going home the day of surgery  You must have a support person drive you home. They should stay with you overnight, and they may need to help with your self-care.  If you don't have a support person, please tells us as soon as possible. We can help.  After surgery  If it's hard to control your pain or you need more pain medicine, please call your surgeon's office.  Questions?   If you have any questions for  your care team, list them here:   ____________________________________________________________________________________________________________________________________________________________________________________________________________________________________________________________  For informational purposes only. Not to replace the advice of your health care provider. Copyright   2003, 2019 [facility]. All rights reserved. Clinically reviewed by Zachary Tate MD. [employer] 637799 - REV 02/25.

## 2025-07-24 NOTE — PROGRESS NOTES
Preoperative Evaluation  [facility]  [address] [address]  [place] [facility] [address]  Phone: [phone]  Primary Provider: Yosef Mccabe MD  Pre-op Performing Provider: Yosef Mccabe MD  Jul 24, 2025 7/24/2025   Surgical Information   What procedure is being done? rotor cuff surgery   Facility or Hospital where procedure/surgery will be performed: [facility]   Who is doing the procedure / surgery? dr webber   Date of surgery / procedure: aug 8   Time of surgery / procedure: dont know   Where do you plan to recover after surgery? at home with family     Fax number for surgical facility:[phone] Assessment & Plan     The proposed surgical procedure is considered INTERMEDIATE risk.    Preop general physical exam  Medically optimized for surgery provided labs and EKG are stable   - EKG 12-lead complete w/read - Clinics  - Creatinine; Future  - Potassium; Future  - Hemoglobin; Future  - Sodium; Future    Nontraumatic complete tear of right rotator cuff      Benign essential hypertension  Now under good control  Check labs before surgery  Skip hydrochlorothiazide prior to surgery  Take combo/ARB with sip of water on AM surgery to avoid very high blood pressure prior to surgery     Hypothyroidism, unspecified type  Stable     Gastroesophageal reflux disease, unspecified whether esophagitis present  OK to take famotidine with sip of water prior to surgery     Screening for diabetes mellitus    - Hemoglobin A1c; Future            - No identified additional risk factors other than previously addressed    Preoperative Medication Instructions  Antiplatelet or Anticoagulation Medication Instructions   - We reviewed the medication list and the patient is not on an antiplatelet or anticoagulation medications.    Additional Medication Instructions  Take all scheduled medications on the day of surgery EXCEPT for modifications listed below:   - ACE/ARB/ARNI (lisinopril,  enalapril, losartan, valsartan, olmesartan, sacubritril/valsartan) : Continue without modification (e.g., MAC anesthesia, neurosurgery, spine surgery, heart failure, or labile hypertension with risk of hypertension).   - Calcium Channel Blockers (amlodipine, diltiazem, felodipine): May be continued on the day of surgery.   - Diuretics (furosemide, hydrochlorothiazide, chlorothalidone): DO NOT TAKE on the day of surgery.    Recommendation  Approval given to proceed with proposed procedure pending review of diagnostic evaluation.        Subjective   Keyonna Yoder is a 74 year old, presenting for the following:  Pre-Op Exam (Patient is here for a Pre-op for rotator cuff surgery.   DOS 8/8/2025/)          7/24/2025     8:10 AM   Additional Questions   Roomed by Mehul SANCHEZ MA   Accompanied by Self     HPI: Desire rotator cuff surgery.  This patient reports being able to perform 4 METS of physical activity without chest pain or dyspnea.           7/24/2025   Pre-Op Questionnaire   Have you ever had a heart attack or stroke? No   Have you ever had surgery on your heart or blood vessels, such as a stent placement, a coronary artery bypass, or surgery on an artery in your head, neck, heart, or legs? No   Do you have chest pain with activity? No   Do you have a history of heart failure? No   Do you currently have a cold, bronchitis or symptoms of other infection? No   Do you have a cough, shortness of breath, or wheezing? No   Do you or anyone in your family have previous history of blood clots? No   Do you or does anyone in your family have a serious bleeding problem such as prolonged bleeding following surgeries or cuts? No   Have you ever had problems with anemia or been told to take iron pills? No   Have you had any abnormal blood loss such as black, tarry or bloody stools, or abnormal vaginal bleeding? No   Have you ever had a blood transfusion? No   Are you willing to have a blood transfusion if it is medically needed before,  during, or after your surgery? Yes   Have you or any of your relatives ever had problems with anesthesia? No   Do you have sleep apnea, excessive snoring or daytime drowsiness? No   Do you have any artifical heart valves or other implanted medical devices like a pacemaker, defibrillator, or continuous glucose monitor? No   Do you have artificial joints? No   Are you allergic to latex? No         Patient Active Problem List    Diagnosis Date Noted    Chronic right shoulder pain 02/17/2025     Priority: Medium    Benign essential hypertension 01/27/2025     Priority: Medium    Lumbago 08/11/2017     Priority: Medium    Cholecystitis 04/23/2013     Priority: Medium    Osteopenia 12/28/2012     Priority: Medium    Hypothyroidism 02/09/2012     Priority: Medium    CARDIOVASCULAR SCREENING; LDL GOAL LESS THAN 130 02/09/2012     Priority: Medium    GERD (gastroesophageal reflux disease) 02/09/2012     Priority: Medium      Past Medical History:   Diagnosis Date    Abdominal pain, other specified site 2013    RUQ    Gallstones 2013    GERD (gastroesophageal reflux disease)     Hypothyroidism      Past Surgical History:   Procedure Laterality Date    COLONOSCOPY      LAPAROSCOPIC CHOLECYSTECTOMY  4/24/2013    Procedure: LAPAROSCOPIC CHOLECYSTECTOMY;  Laparoscopic cholecystectomy.;  Surgeon: Bong Pyle MD;  Location:  OR     Current Outpatient Medications   Medication Sig Dispense Refill    ACETAMINOPHEN PO Take 650 mg by mouth as needed for mild pain. Prn 2 tablets at a time      amLODIPine-valsartan (EXFORGE) 5-320 MG tablet Take 1 tablet by mouth daily. 90 tablet 3    diclofenac (VOLTAREN) 1 % topical gel Apply 2 g topically 4 times daily as needed for moderate pain. 100 g 3    Famotidine (PEPCID PO) Every morning      hydroCHLOROthiazide 12.5 MG tablet Take 1 tablet (12.5 mg) by mouth daily. 90 tablet 3    levothyroxine (SYNTHROID/LEVOTHROID) 100 MCG tablet Take 1 tablet (100 mcg) by mouth daily. 90 tablet 3  "      Allergies   Allergen Reactions    Duloxetine Diarrhea    External Allergen Needs Reconciliation - See Comment      Please reconcile the Patient's allergy reported as ASPIRINDARVON   and update accordingly    Lasix [Furosemide]         Social History     Tobacco Use    Smoking status: Never    Smokeless tobacco: Never   Substance Use Topics    Alcohol use: Yes     Alcohol/week: 0.0 standard drinks of alcohol     Comment: 2 drinks per week     Family History   Problem Relation Age of Onset    C.A.D. Mother     Cerebrovascular Disease Father      History   Drug Use No           A 10 organ systems ROS is negative other than any pertinent positives or negatives previously stated.     Objective    /76 (BP Location: Left arm, Patient Position: Sitting, Cuff Size: Adult Regular)   Pulse 73   Temp 97.7  F (36.5  C) (Temporal)   Resp 20   Ht 1.461 m (4' 9.5\")   Wt 63.5 kg (139 lb 14.4 oz)   SpO2 98%   BMI 29.75 kg/m     Estimated body mass index is 29.75 kg/m  as calculated from the following:    Height as of this encounter: 1.461 m (4' 9.5\").    Weight as of this encounter: 63.5 kg (139 lb 14.4 oz).  Physical Exam  GENERAL: alert and no distress  EYES: Eyes grossly normal to inspection, PERRL and conjunctivae and sclerae normal  HENT: ear canals and TM's normal, nose and mouth without ulcers or lesions  NECK: no adenopathy, no asymmetry, masses, or scars  RESP: lungs clear to auscultation - no rales, rhonchi or wheezes  CV: regular rate and rhythm, normal S1 S2, no S3 or S4, no murmur, click or rub, no peripheral edema  ABDOMEN: soft, nontender, no hepatosplenomegaly, no masses and bowel sounds normal  MS: no gross musculoskeletal defects noted, no edema  SKIN: no suspicious lesions or rashes  NEURO: Normal strength and tone, mentation intact and speech normal  PSYCH: mentation appears normal, affect normal/bright    Recent Labs   Lab Test 06/26/25  1016 03/03/25  0953 02/17/25  0949 01/27/25  0934   HGB  " --   --   --  13.0   PLT  --   --   --  413     --   --  144   POTASSIUM 4.1 4.3   < > 4.3   CR 0.75 0.65   < > 0.65   A1C  --   --   --  5.8*    < > = values in this interval not displayed.        Diagnostics  Labs pending at this time.  Results will be reviewed when available.     EKG pending     Revised Cardiac Risk Index (RCRI)  The patient has the following serious cardiovascular risks for perioperative complications:   - No serious cardiac risks = 0 points     RCRI Interpretation: 0 points: Class I (very low risk - 0.4% complication rate)         Signed Electronically by: Yosef Mccabe MD  A copy of this evaluation report is provided to the requesting physician.

## 2025-07-28 ENCOUNTER — TELEPHONE (OUTPATIENT)
Dept: ORTHOPEDICS | Facility: CLINIC | Age: 75
End: 2025-07-28
Payer: MEDICARE

## 2025-07-28 NOTE — TELEPHONE ENCOUNTER
I called Keyonna Yoder to confirm her  name and phone number for surgery on 8/8 and also see if she had any final questions before surgery.

## 2025-07-29 NOTE — TELEPHONE ENCOUNTER
Her  will be Carmel Keys.  She does not have her phone number with her but will leave it with the  at the surgery center when she checks in.      She asks about driving after surgery.  Advised that she should not drive while taking narcotics and she should be able to control the vehicle with both arms when driving.  She understands.      Her only other question is about her report time.  She will be contacted by the ASC a few days before surgery with that time.

## 2025-08-18 ENCOUNTER — THERAPY VISIT (OUTPATIENT)
Dept: PHYSICAL THERAPY | Facility: CLINIC | Age: 75
End: 2025-08-18
Payer: MEDICARE

## 2025-08-18 DIAGNOSIS — M75.121 NONTRAUMATIC COMPLETE TEAR OF RIGHT ROTATOR CUFF: ICD-10-CM

## 2025-08-18 DIAGNOSIS — Z98.890 STATUS POST RIGHT ROTATOR CUFF REPAIR: ICD-10-CM

## 2025-08-18 DIAGNOSIS — M75.121 NONTRAUMATIC COMPLETE TEAR OF ROTATOR CUFF, RIGHT: ICD-10-CM

## 2025-08-18 DIAGNOSIS — M67.911 TENDINOPATHY OF ROTATOR CUFF, RIGHT: ICD-10-CM

## 2025-08-18 DIAGNOSIS — M25.511 ACUTE PAIN OF RIGHT SHOULDER: Primary | ICD-10-CM

## 2025-08-18 PROCEDURE — 97530 THERAPEUTIC ACTIVITIES: CPT | Mod: GP | Performed by: PHYSICAL THERAPIST

## 2025-08-18 PROCEDURE — 97110 THERAPEUTIC EXERCISES: CPT | Mod: GP | Performed by: PHYSICAL THERAPIST

## 2025-08-18 PROCEDURE — 97161 PT EVAL LOW COMPLEX 20 MIN: CPT | Mod: GP | Performed by: PHYSICAL THERAPIST

## 2025-08-18 ASSESSMENT — ACTIVITIES OF DAILY LIVING (ADL)
PUTTING_ON_AN_UNDERSHIRT_OR_A_PULLOVER_SWEATER: 3
AT_ITS_WORST?: 1
PUSHING_WITH_THE_INVOLVED_ARM: 2
PLEASE_INDICATE_YOR_PRIMARY_REASON_FOR_REFERRAL_TO_THERAPY:: SHOULDER
PUTTING_ON_YOUR_PANTS: 3
REMOVING_SOMETHING_FROM_YOUR_BACK_POCKET: 2
REACHING_FOR_SOMETHING_ON_A_HIGH_SHELF: 3
PUTTING_ON_A_SHIRT_THAT_BUTTONS_DOWN_THE_FRONT: 3

## 2025-08-19 ENCOUNTER — APPOINTMENT (OUTPATIENT)
Dept: URBAN - METROPOLITAN AREA CLINIC 240 | Age: 75
Setting detail: DERMATOLOGY
End: 2025-08-19

## 2025-08-19 DIAGNOSIS — D22 MELANOCYTIC NEVI: ICD-10-CM

## 2025-08-19 DIAGNOSIS — D18.0 HEMANGIOMA: ICD-10-CM

## 2025-08-19 DIAGNOSIS — L82.1 OTHER SEBORRHEIC KERATOSIS: ICD-10-CM

## 2025-08-19 DIAGNOSIS — Z71.89 OTHER SPECIFIED COUNSELING: ICD-10-CM

## 2025-08-19 DIAGNOSIS — B00.1 HERPESVIRAL VESICULAR DERMATITIS: ICD-10-CM

## 2025-08-19 DIAGNOSIS — L81.4 OTHER MELANIN HYPERPIGMENTATION: ICD-10-CM

## 2025-08-19 DIAGNOSIS — L72.0 EPIDERMAL CYST: ICD-10-CM

## 2025-08-19 PROBLEM — D18.01 HEMANGIOMA OF SKIN AND SUBCUTANEOUS TISSUE: Status: ACTIVE | Noted: 2025-08-19

## 2025-08-19 PROBLEM — M25.511 ACUTE PAIN OF RIGHT SHOULDER: Status: ACTIVE | Noted: 2025-08-19

## 2025-08-19 PROBLEM — D22.5 MELANOCYTIC NEVI OF TRUNK: Status: ACTIVE | Noted: 2025-08-19

## 2025-08-19 PROCEDURE — OTHER COUNSELING: OTHER

## 2025-08-19 PROCEDURE — OTHER PRESCRIPTION MEDICATION MANAGEMENT: OTHER

## 2025-08-19 PROCEDURE — OTHER PRESCRIPTION: OTHER

## 2025-08-19 PROCEDURE — OTHER MIPS QUALITY: OTHER

## 2025-08-19 PROCEDURE — 99203 OFFICE O/P NEW LOW 30 MIN: CPT

## 2025-08-19 PROCEDURE — OTHER CONSULTATION EXCISION: OTHER

## 2025-08-19 RX ORDER — VALACYCLOVIR 1 G/1
TABLET, FILM COATED ORAL
Qty: 12 | Refills: 6 | Status: ERX

## 2025-08-19 ASSESSMENT — PAIN INTENSITY VAS: HOW INTENSE IS YOUR PAIN 0 BEING NO PAIN, 10 BEING THE MOST SEVERE PAIN POSSIBLE?: NO PAIN

## 2025-08-19 ASSESSMENT — LOCATION ZONE DERM
LOCATION ZONE: NECK
LOCATION ZONE: LIP
LOCATION ZONE: TRUNK

## 2025-08-19 ASSESSMENT — LOCATION DETAILED DESCRIPTION DERM
LOCATION DETAILED: LEFT MID-UPPER BACK
LOCATION DETAILED: LEFT SUPERIOR LATERAL UPPER BACK
LOCATION DETAILED: LEFT LATERAL UPPER BACK
LOCATION DETAILED: LEFT INFERIOR UPPER BACK
LOCATION DETAILED: RIGHT MEDIAL TRAPEZIAL NECK
LOCATION DETAILED: LEFT INFERIOR LATERAL UPPER BACK
LOCATION DETAILED: LEFT LOWER CUTANEOUS LIP

## 2025-08-19 ASSESSMENT — LOCATION SIMPLE DESCRIPTION DERM
LOCATION SIMPLE: LEFT LIP
LOCATION SIMPLE: POSTERIOR NECK
LOCATION SIMPLE: LEFT UPPER BACK

## 2025-08-21 ENCOUNTER — OFFICE VISIT (OUTPATIENT)
Dept: ORTHOPEDICS | Facility: CLINIC | Age: 75
End: 2025-08-21
Payer: MEDICARE

## 2025-08-21 DIAGNOSIS — Z47.89 ORTHOPEDIC AFTERCARE: Primary | ICD-10-CM

## 2025-08-21 DIAGNOSIS — Z98.890 S/P ARTHROSCOPY OF RIGHT SHOULDER: ICD-10-CM

## 2025-08-25 ENCOUNTER — THERAPY VISIT (OUTPATIENT)
Dept: PHYSICAL THERAPY | Facility: CLINIC | Age: 75
End: 2025-08-25
Payer: MEDICARE

## 2025-08-25 DIAGNOSIS — M25.511 ACUTE PAIN OF RIGHT SHOULDER: Primary | ICD-10-CM

## 2025-08-25 PROCEDURE — 97140 MANUAL THERAPY 1/> REGIONS: CPT | Mod: GP | Performed by: PHYSICAL THERAPIST

## 2025-08-25 PROCEDURE — 97110 THERAPEUTIC EXERCISES: CPT | Mod: GP | Performed by: PHYSICAL THERAPIST

## 2025-08-28 ENCOUNTER — THERAPY VISIT (OUTPATIENT)
Dept: PHYSICAL THERAPY | Facility: CLINIC | Age: 75
End: 2025-08-28
Payer: MEDICARE

## 2025-08-28 DIAGNOSIS — M25.511 ACUTE PAIN OF RIGHT SHOULDER: Primary | ICD-10-CM
